# Patient Record
Sex: MALE | Race: WHITE | NOT HISPANIC OR LATINO | Employment: OTHER | URBAN - METROPOLITAN AREA
[De-identification: names, ages, dates, MRNs, and addresses within clinical notes are randomized per-mention and may not be internally consistent; named-entity substitution may affect disease eponyms.]

---

## 2017-01-19 RX ORDER — SALMETEROL XINAFOATE 50 UG/1
POWDER, METERED ORAL; RESPIRATORY (INHALATION)
Qty: 1 EACH | Refills: 4 | Status: SHIPPED | OUTPATIENT
Start: 2017-01-19 | End: 2017-06-14 | Stop reason: SDUPTHER

## 2017-03-08 RX ORDER — ALBUTEROL SULFATE 90 UG/1
AEROSOL, METERED RESPIRATORY (INHALATION)
Qty: 18 INHALER | Refills: 9 | Status: SHIPPED | OUTPATIENT
Start: 2017-03-08 | End: 2018-05-23 | Stop reason: SDUPTHER

## 2017-05-03 ENCOUNTER — OFFICE VISIT (OUTPATIENT)
Dept: INTERNAL MEDICINE | Facility: CLINIC | Age: 57
End: 2017-05-03
Payer: MEDICAID

## 2017-05-03 VITALS
BODY MASS INDEX: 24.57 KG/M2 | TEMPERATURE: 98 F | WEIGHT: 143.94 LBS | RESPIRATION RATE: 16 BRPM | DIASTOLIC BLOOD PRESSURE: 62 MMHG | HEART RATE: 64 BPM | HEIGHT: 64 IN | SYSTOLIC BLOOD PRESSURE: 134 MMHG

## 2017-05-03 DIAGNOSIS — E78.5 HYPERLIPIDEMIA, UNSPECIFIED HYPERLIPIDEMIA TYPE: ICD-10-CM

## 2017-05-03 DIAGNOSIS — Z12.11 SCREENING FOR COLON CANCER: ICD-10-CM

## 2017-05-03 DIAGNOSIS — N52.9 ERECTILE DYSFUNCTION, UNSPECIFIED ERECTILE DYSFUNCTION TYPE: ICD-10-CM

## 2017-05-03 DIAGNOSIS — R73.03 PRE-DIABETES: ICD-10-CM

## 2017-05-03 DIAGNOSIS — Z00.00 ANNUAL PHYSICAL EXAM: Primary | ICD-10-CM

## 2017-05-03 DIAGNOSIS — J45.22 MILD INTERMITTENT ASTHMA WITH STATUS ASTHMATICUS: ICD-10-CM

## 2017-05-03 PROCEDURE — 99396 PREV VISIT EST AGE 40-64: CPT | Mod: S$PBB,,, | Performed by: INTERNAL MEDICINE

## 2017-05-03 PROCEDURE — 99999 PR PBB SHADOW E&M-EST. PATIENT-LVL III: CPT | Mod: PBBFAC,,, | Performed by: INTERNAL MEDICINE

## 2017-05-03 PROCEDURE — 99213 OFFICE O/P EST LOW 20 MIN: CPT | Mod: PBBFAC,PO | Performed by: INTERNAL MEDICINE

## 2017-05-03 RX ORDER — SILDENAFIL 50 MG/1
50 TABLET, FILM COATED ORAL
Qty: 6 TABLET | Refills: 4 | Status: SHIPPED | OUTPATIENT
Start: 2017-05-03 | End: 2019-06-12 | Stop reason: SDUPTHER

## 2017-05-03 NOTE — MR AVS SNAPSHOT
[facility]   [facility]  [place] [address]  Phone: [phone]  Fax: [phone]                  Nakul Britton   5/3/2017 2:00 PM   Office Visit    Description:  Male : 1960   Provider:  Zaida Ornelas MD   Department:  [facility]           Reason for Visit     Annual Exam           Diagnoses this Visit        Comments    Annual physical exam    -  Primary     Pre-diabetes         Hyperlipidemia, unspecified hyperlipidemia type         Mild intermittent asthma with status asthmaticus         Screening for colon cancer         Erectile dysfunction, unspecified erectile dysfunction type                To Do List           Future Appointments        Provider Department Dept Phone    5/10/2017 12:15 PM SPECIMEN, [facility] - Specimen Lab [phone]    5/10/2017 12:30 PM LAB, [facility] - Laboratory [phone]      To Schedule:     Please call the Endoscopy Department at [phone] to schedule your appointment.          Goals (5 Years of Data)     None       These Medications        Disp Refills Start End    sildenafil (VIAGRA) 50 MG tablet 6 tablet 4 5/3/2017     Take 1 tablet (50 mg total) by mouth as needed. - Oral    [facility]: [facility] [place], [place] [address] #: [phone]         [facility]     [facility] Care [facility] -  Assistance  Unless otherwise directed by your provider, please contact [facility], our nurse care line that is available for  assistance.     Registered nurses in the [facility] provide: appointment scheduling, clinical advisement, health education, and other advisory services.  Call: [phone] (toll free)               Medications           Message regarding Medications     Verify the changes and/or additions to your medication regime listed below are the same as discussed with your clinician today.  If  "any of these changes or additions are incorrect, please notify your healthcare provider.             Verify that the below list of medications is an accurate representation of the medications you are currently taking.  If none reported, the list may be blank. If incorrect, please contact your healthcare provider. Carry this list with you in case of emergency.           Current Medications     fluticasone (FLOVENT HFA) 110 mcg/actuation inhaler INHALE 2 PUFFS INTO THE LUNGS EVERY 12 HOURS    montelukast (SINGULAIR) 10 mg tablet take 1 tablet by mouth at bedtime    predniSONE (DELTASONE) 10 MG tablet take 1 tablet by mouth once daily if needed    SEREVENT DISKUS 50 mcg/dose diskus inhaler inhale 1 puff orally twice a day    sildenafil (VIAGRA) 50 MG tablet Take 1 tablet (50 mg total) by mouth as needed.    VENTOLIN HFA 90 mcg/actuation inhaler inhale 2 puffs by mouth four times a day if needed           Clinical Reference Information           Your Vitals Were     BP Pulse Temp Resp Height Weight    134/62 (BP Location: Left arm, Patient Position: Sitting, BP Method: Manual) 64 98.3 °F (36.8 °C) (Oral) 16 5' 4" (1.626 m) 65.3 kg (143 lb 15.4 oz)    BMI                24.71 kg/m2          Blood Pressure          Most Recent Value    BP  134/62      Allergies as of 5/3/2017     No Known Allergies      Immunizations Administered on Date of Encounter - 5/3/2017     None      Orders Placed During Today's Visit      Normal Orders This Visit    Case request GI: COLONOSCOPY     Future Labs/Procedures Expected by Expires    CBC auto differential  5/3/2017 7/2/2018    Comprehensive metabolic panel  5/3/2017 7/2/2018    Hemoglobin A1c  5/3/2017 7/2/2018    Lipid panel  5/3/2017 7/2/2018    TSH  5/3/2017 7/2/2018    Urinalysis  As directed 5/3/2018      [facility] Sign-Up     Activating your [facility] account is as easy as 1-2-3!     1) Visit [email or web address], select Sign Up Now, enter this activation code and your date of birth, " then select Next.  [email or web address]  Expires: 6/17/2017  2:53 PM      2) Create a username and password to use when you visit [facility] in the future and select a security question in case you lose your password and select Next.    3) Enter your e-mail address and click Sign Up!    Additional Information  If you have questions, please e-mail [email or web address] or call [phone] to talk to our [facility] staff. Remember, [facility] is NOT to be used for urgent needs. For medical emergencies, dial 911.         Language Assistance Services     ATTENTION: Language assistance services are available, free of charge. Please call [phone].      ATENCIÓN: Si habla español, tiene a pulliam disposición servicios gratuitos de asistencia lingüística. Llame al [phone].     CHÚ Ý: N?u b?n nói Ti?ng Vi?t, có các d?ch v? h? tr? ngôn ng? mi?n phí dành cho b?n. G?i s? [phone].         [facility] complies with applicable Federal civil rights laws and does not discriminate on the basis of race, color, national origin, age, disability, or sex.

## 2017-05-03 NOTE — PROGRESS NOTES
Subjective:      Nakul Britton is a 56 y.o. male who presents for annual exam.    Family History:  family history includes No Known Problems in his father and mother.    Health Maintenance:  Health Maintenance       Date Due Completion Date    Hepatitis C Screening 1960 ---    TETANUS VACCINE 11/24/1976 ---    Colonoscopy 11/24/2008 ---    Influenza Vaccine 8/1/2017 ---    Lipid Panel 3/17/2021 3/17/2016        Eye exam: not in last few years  Dental Exam: not in last few years, no issues    Exercise: does [employer]  Diet: cooks with butter, adds salt to food, does not frequently eat fast food or restaurant food   Body mass index is 24.71 kg/(m^2).      Meds:   Current Outpatient Prescriptions:     fluticasone (FLOVENT HFA) 110 mcg/actuation inhaler, INHALE 2 PUFFS INTO THE LUNGS EVERY 12 HOURS, Disp: 3 g, Rfl: 3    montelukast (SINGULAIR) 10 mg tablet, take 1 tablet by mouth at bedtime, Disp: 30 tablet, Rfl: 11    predniSONE (DELTASONE) 10 MG tablet, take 1 tablet by mouth once daily if needed, Disp: 30 tablet, Rfl: 3    SEREVENT DISKUS 50 mcg/dose diskus inhaler, inhale 1 puff orally twice a day, Disp: 1 each, Rfl: 4    sildenafil (VIAGRA) 50 MG tablet, Take 1 tablet (50 mg total) by mouth as needed., Disp: 6 tablet, Rfl: 4    VENTOLIN HFA 90 mcg/actuation inhaler, inhale 2 puffs by mouth four times a day if needed, Disp: 18 Inhaler, Rfl: 9    PMHx:   Past Medical History:   Diagnosis Date    Allergy     Asthma     Hiatal hernia        PSHx:  Past Surgical History:   Procedure Laterality Date    CATARACT EXTRACTION         SocHx:   Social History     Social History    Marital status: Single     Spouse name: N/A    Number of children: N/A    Years of education: N/A     Social History Main Topics    Smoking status: Never Smoker    Smokeless tobacco: Never Used    Alcohol use 0.0 oz/week     0 Standard drinks or equivalent per week      Comment: 4-6 times per year, socially    Drug  use: No    Sexual activity: Yes     Partners: Female     Other Topics Concern    None     Social History Narrative    Works as a window washer       Review of Systems   Constitutional: Negative for chills, diaphoresis, fatigue and fever.   HENT: Negative for congestion, dental problem, ear discharge, ear pain, mouth sores, rhinorrhea, sinus pressure, sore throat and tinnitus.    Eyes: Negative for visual disturbance.   Respiratory: Negative for cough, chest tightness, shortness of breath and wheezing.    Cardiovascular: Negative for chest pain, palpitations and leg swelling.   Gastrointestinal: Negative for abdominal pain, blood in stool, constipation, diarrhea, nausea and vomiting.        Acid reflux, stable, aggravated by spicy foods, takes tums as needed   Genitourinary: Negative for decreased urine volume, dysuria, hematuria and urgency.   Musculoskeletal: Negative for arthralgias and myalgias.   Skin: Negative for rash.   Neurological: Negative for dizziness, speech difficulty, weakness, light-headedness, numbness and headaches.   Hematological: Negative for adenopathy.       Objective:      Physical Exam   Constitutional: He is oriented to person, place, and time. Vital signs are normal. He appears well-developed and well-nourished. No distress.   HENT:   Head: Normocephalic and atraumatic.   Right Ear: Hearing, tympanic membrane, external ear and ear canal normal. Tympanic membrane is not erythematous and not bulging.   Left Ear: Hearing, tympanic membrane, external ear and ear canal normal. Tympanic membrane is not erythematous and not bulging.   Nose: Nose normal.   Mouth/Throat: Uvula is midline, oropharynx is clear and moist and mucous membranes are normal. No oropharyngeal exudate or posterior oropharyngeal erythema.   Eyes: Conjunctivae, EOM and lids are normal. Pupils are equal, round, and reactive to light. No scleral icterus.   Neck: Trachea normal and normal range of motion. Neck supple. No  thyroid mass and no thyromegaly present.   Cardiovascular: Normal rate, regular rhythm, normal heart sounds, intact distal pulses and normal pulses.    No murmur heard.  Pulmonary/Chest: Effort normal and breath sounds normal. No tachypnea and no bradypnea. No respiratory distress. He has no wheezes.   Abdominal: Soft. Bowel sounds are normal. He exhibits no distension. There is no hepatosplenomegaly. There is no tenderness. There is no rigidity, no rebound and no guarding.   Musculoskeletal: Normal range of motion. He exhibits no edema or tenderness.   Lymphadenopathy:     He has no cervical adenopathy.        Right: No supraclavicular adenopathy present.        Left: No supraclavicular adenopathy present.   Neurological: He is alert and oriented to person, place, and time. He has normal strength and normal reflexes. He displays normal reflexes. No cranial nerve deficit or sensory deficit. Coordination and gait normal.   Skin: Skin is warm, dry and intact. No rash noted. He is not diaphoretic.   Psychiatric: He has a normal mood and affect.   Vitals reviewed.      Assessment:       1. Annual physical exam    2. Pre-diabetes    3. Hyperlipidemia, unspecified hyperlipidemia type    4. Mild intermittent asthma with status asthmaticus    5. Erectile dysfunction, unspecified erectile dysfunction type    6. Screening for colon cancer        Plan:       1,6. Ordered CBC, CMP, TSH, screening hepatitis C, lipid panel and U/A. Ordered colonoscopy.  2. Check HbA1c  3. Lipid panel, reduce intake of fatty foods, continue exercise program  4. No recent exacerbations, stable on serevent, flovent, singulair and PRN albuterol.  5. Patient did not try viagra but interested in trial, sent new Rx to patient's pharmacy.    RTC in 1 year or sooner if needed    Zaida Ornelas MD

## 2017-05-10 ENCOUNTER — LAB VISIT (OUTPATIENT)
Dept: LAB | Facility: HOSPITAL | Age: 57
End: 2017-05-10
Attending: INTERNAL MEDICINE
Payer: MEDICAID

## 2017-05-10 DIAGNOSIS — Z11.59 NEED FOR HEPATITIS C SCREENING TEST: ICD-10-CM

## 2017-05-10 DIAGNOSIS — Z00.00 ANNUAL PHYSICAL EXAM: ICD-10-CM

## 2017-05-10 LAB
ALBUMIN SERPL BCP-MCNC: 3.7 G/DL
ALP SERPL-CCNC: 63 U/L
ALT SERPL W/O P-5'-P-CCNC: 20 U/L
ANION GAP SERPL CALC-SCNC: 9 MMOL/L
AST SERPL-CCNC: 19 U/L
BASOPHILS # BLD AUTO: 0.03 K/UL
BASOPHILS NFR BLD: 0.5 %
BILIRUB SERPL-MCNC: 0.3 MG/DL
BUN SERPL-MCNC: 14 MG/DL
CALCIUM SERPL-MCNC: 9.5 MG/DL
CHLORIDE SERPL-SCNC: 105 MMOL/L
CHOLEST/HDLC SERPL: 4 {RATIO}
CO2 SERPL-SCNC: 26 MMOL/L
CREAT SERPL-MCNC: 0.9 MG/DL
DIFFERENTIAL METHOD: ABNORMAL
EOSINOPHIL # BLD AUTO: 0.2 K/UL
EOSINOPHIL NFR BLD: 2.3 %
ERYTHROCYTE [DISTWIDTH] IN BLOOD BY AUTOMATED COUNT: 15.7 %
EST. GFR  (AFRICAN AMERICAN): >60 ML/MIN/1.73 M^2
EST. GFR  (NON AFRICAN AMERICAN): >60 ML/MIN/1.73 M^2
GLUCOSE SERPL-MCNC: 94 MG/DL
HCT VFR BLD AUTO: 41.5 %
HDL/CHOLESTEROL RATIO: 25 %
HDLC SERPL-MCNC: 208 MG/DL
HDLC SERPL-MCNC: 52 MG/DL
HGB BLD-MCNC: 14 G/DL
LDLC SERPL CALC-MCNC: 129.6 MG/DL
LYMPHOCYTES # BLD AUTO: 2.7 K/UL
LYMPHOCYTES NFR BLD: 40.8 %
MCH RBC QN AUTO: 28.8 PG
MCHC RBC AUTO-ENTMCNC: 33.7 %
MCV RBC AUTO: 85 FL
MONOCYTES # BLD AUTO: 0.7 K/UL
MONOCYTES NFR BLD: 10.2 %
NEUTROPHILS # BLD AUTO: 3 K/UL
NEUTROPHILS NFR BLD: 45.7 %
NONHDLC SERPL-MCNC: 156 MG/DL
PLATELET # BLD AUTO: 261 K/UL
PMV BLD AUTO: 9.8 FL
POTASSIUM SERPL-SCNC: 4.2 MMOL/L
PROT SERPL-MCNC: 7.1 G/DL
RBC # BLD AUTO: 4.86 M/UL
SODIUM SERPL-SCNC: 140 MMOL/L
TRIGL SERPL-MCNC: 132 MG/DL
TSH SERPL DL<=0.005 MIU/L-ACNC: 1.89 UIU/ML
WBC # BLD AUTO: 6.64 K/UL

## 2017-05-10 PROCEDURE — 83036 HEMOGLOBIN GLYCOSYLATED A1C: CPT

## 2017-05-10 PROCEDURE — 85025 COMPLETE CBC W/AUTO DIFF WBC: CPT

## 2017-05-10 PROCEDURE — 80061 LIPID PANEL: CPT

## 2017-05-10 PROCEDURE — 80053 COMPREHEN METABOLIC PANEL: CPT

## 2017-05-10 PROCEDURE — 86803 HEPATITIS C AB TEST: CPT

## 2017-05-10 PROCEDURE — 84443 ASSAY THYROID STIM HORMONE: CPT

## 2017-05-10 PROCEDURE — 36415 COLL VENOUS BLD VENIPUNCTURE: CPT | Mod: PO

## 2017-05-11 LAB
ESTIMATED AVG GLUCOSE: 128 MG/DL
HBA1C MFR BLD HPLC: 6.1 %
HCV AB SERPL QL IA: NEGATIVE

## 2017-06-24 RX ORDER — SALMETEROL XINAFOATE 50 UG/1
POWDER, METERED ORAL; RESPIRATORY (INHALATION)
Qty: 1 EACH | Refills: 4 | Status: SHIPPED | OUTPATIENT
Start: 2017-06-24 | End: 2018-05-09 | Stop reason: SDUPTHER

## 2017-11-09 DIAGNOSIS — J45.20 MILD INTERMITTENT ASTHMA WITHOUT COMPLICATION: ICD-10-CM

## 2017-11-10 DIAGNOSIS — Z23 NEED FOR HEPATITIS B VACCINATION: Primary | ICD-10-CM

## 2017-11-10 RX ORDER — DEXAMETHASONE 4 MG/1
TABLET ORAL
Qty: 12 G | Refills: 11 | Status: SHIPPED | OUTPATIENT
Start: 2017-11-10 | End: 2018-11-15 | Stop reason: SDUPTHER

## 2017-11-10 RX ORDER — MONTELUKAST SODIUM 10 MG/1
TABLET ORAL
Qty: 90 TABLET | Refills: 3 | Status: SHIPPED | OUTPATIENT
Start: 2017-11-10 | End: 2018-09-27 | Stop reason: SDUPTHER

## 2017-11-10 NOTE — TELEPHONE ENCOUNTER
I called Mr Britton to let him know his Singular and Flovent has been refilled to [facility]. He asked if we only refill on Fridays? I told him Dr Holcomb is on vacation, and I was not here Wed. or Thurs due to vacation/car repairs, but I was here 11-6, 7, and 11-10. And the medicine is now at [facility].Yessy Kent LPN.

## 2017-11-10 NOTE — TELEPHONE ENCOUNTER
----- Message from Yahaira Melvin sent at 11/10/2017  2:05 PM CST -----  Contact: Nakul     tel:   [phone]  Pt.says he was told by Dr. Holcomb that he did not need to come in if he was not having any problems.    Has been on Serevent/Singulair and Flovent for years.   Out Of Medications.    Called your office last week for refills.  Pharmacy:  [facility]     [address]      Pt.. Says if Dr. Holcomb is on vacation, isn't there another dr. Suazo for  His pts. And why no response?    Pls call.

## 2017-11-14 RX ORDER — PREDNISONE 10 MG/1
TABLET ORAL
Qty: 30 TABLET | Refills: 3 | Status: SHIPPED | OUTPATIENT
Start: 2017-11-14 | End: 2018-04-04 | Stop reason: SDUPTHER

## 2017-11-15 NOTE — TELEPHONE ENCOUNTER
Spoke to pharmacistSuzan at [facility] pharmacy, patient is interested in getting this vaccine and pharmacy would like the approval to administer. Message sent to Dr Grant

## 2017-11-15 NOTE — TELEPHONE ENCOUNTER
Why does he need  Does he work in the health field?  Is he traveling to country it si required?  Does he care for someone w/ hep B?

## 2017-11-16 RX ORDER — HEPATITIS B VACCINE (RECOMBINANT) 10 UG/ML
INJECTION, SUSPENSION INTRAMUSCULAR; SUBCUTANEOUS
Qty: 1 VIAL | Refills: 0 | Status: SHIPPED | OUTPATIENT
Start: 2017-11-16 | End: 2019-06-12 | Stop reason: ALTCHOICE

## 2017-11-16 NOTE — TELEPHONE ENCOUNTER
"Spoke with patient, stated he has friends with Hep B and has frequent contact with them and "thought it was a good idea to get protected against it(Hep B)". Explain Hep B transmission briefly. Asked if he feels he needs this injection, patient stated, "yeah, I would like to get it if I can". Request sent to Dr Grant  "

## 2018-04-04 RX ORDER — PREDNISONE 10 MG/1
TABLET ORAL
Qty: 30 TABLET | Refills: 1 | Status: SHIPPED | OUTPATIENT
Start: 2018-04-04 | End: 2019-05-22 | Stop reason: SDUPTHER

## 2018-04-23 ENCOUNTER — TELEPHONE (OUTPATIENT)
Dept: ENDOSCOPY | Facility: HOSPITAL | Age: 58
End: 2018-04-23

## 2018-05-09 DIAGNOSIS — R06.09 DOE (DYSPNEA ON EXERTION): Primary | ICD-10-CM

## 2018-05-09 NOTE — TELEPHONE ENCOUNTER
----- Message from Yahaira Ngozi sent at 5/9/2018  1:51 PM CDT -----  Contact: Nakul   tel:  [phone]  Rx Refill/Request    Who Called:  Nakul    [phone]  Refill or New Rx:      refill  RX Name and Strength:  Serevent Diskus   50 Mcg  How is the patient currently taking it? (ex. 1XDay):  As directed   Is this a 30 day or 90 day RX:  30 day   Preferred Pharmacy with phone number:  [facility]   Tel:  [phone]   Local or Mail Order:  Local   Ordering Provider:   Dr. Holcomb   Communication Preference [facility] to Pt. (or) Call Back # and timeframe):  Call back   Additional Information:    OUt of Medication

## 2018-05-10 RX ORDER — PREDNISONE 10 MG/1
TABLET ORAL
Qty: 30 TABLET | Refills: 1 | OUTPATIENT
Start: 2018-05-10

## 2018-05-14 RX ORDER — PREDNISONE 20 MG/1
TABLET ORAL
Refills: 0 | COMMUNITY
Start: 2018-04-04 | End: 2020-12-01

## 2018-05-14 RX ORDER — PREDNISONE 10 MG/1
TABLET ORAL
Qty: 30 TABLET | Refills: 1 | OUTPATIENT
Start: 2018-05-14

## 2018-05-23 RX ORDER — ALBUTEROL SULFATE 90 UG/1
AEROSOL, METERED RESPIRATORY (INHALATION)
Qty: 18 G | Refills: 9 | Status: SHIPPED | OUTPATIENT
Start: 2018-05-23 | End: 2019-06-12 | Stop reason: SDUPTHER

## 2018-09-27 DIAGNOSIS — J45.20 MILD INTERMITTENT ASTHMA WITHOUT COMPLICATION: ICD-10-CM

## 2018-09-27 RX ORDER — MONTELUKAST SODIUM 10 MG/1
10 TABLET ORAL NIGHTLY
Qty: 90 TABLET | Refills: 3 | Status: SHIPPED | OUTPATIENT
Start: 2018-09-27 | End: 2019-06-12 | Stop reason: SDUPTHER

## 2018-09-27 NOTE — TELEPHONE ENCOUNTER
----- Message from Elisabeth Corbett sent at 9/27/2018  2:44 PM CDT -----  Contact: Self [phone]  ..Refill request.  montelukast (SINGULAIR) 10 mg tablet    ..  [id number] - [place] [address] AT [facility] & [address]  [place] [address]  Phone: [phone] Fax: [phone]

## 2018-11-15 DIAGNOSIS — J45.20 MILD INTERMITTENT ASTHMA WITHOUT COMPLICATION: ICD-10-CM

## 2018-11-15 RX ORDER — FLUTICASONE PROPIONATE 110 UG/1
AEROSOL, METERED RESPIRATORY (INHALATION)
Qty: 12 G | Refills: 11 | Status: SHIPPED | OUTPATIENT
Start: 2018-11-15 | End: 2019-05-31

## 2019-05-21 DIAGNOSIS — R06.09 DOE (DYSPNEA ON EXERTION): ICD-10-CM

## 2019-05-22 RX ORDER — PREDNISONE 10 MG/1
TABLET ORAL
Qty: 60 TABLET | Refills: 0 | Status: SHIPPED | OUTPATIENT
Start: 2019-05-22 | End: 2019-06-20 | Stop reason: SDUPTHER

## 2019-05-30 ENCOUNTER — TELEPHONE (OUTPATIENT)
Dept: PULMONOLOGY | Facility: CLINIC | Age: 59
End: 2019-05-30

## 2019-05-30 NOTE — TELEPHONE ENCOUNTER
Mr Britton is now on Medicaid and Flovent 110mcg is not covered. Dr Holcomb said Advair 100/50 should work well for him.When I called the pt back he was annoyed about having to change. He said he uses 4 puffs of Flovent and 2 puffs of Serevent in the morning. I told him that was incorrect, Flovent should be 2 puffs bid and I told him Advair is Serevent  and Flovent together in one container. He said he would try it, but he wasn't happy. Yessy Kent LPN

## 2019-05-30 NOTE — TELEPHONE ENCOUNTER
----- Message from Allyson Snow sent at 5/30/2019  1:16 PM CDT -----  Contact: self/[phone]        Pt call stating he needs a refill on the meds listed below.  Rx Refill/Request  fluticasone (FLOVENT HFA) 110 mcg/actuation inhaler    [id number] - [place], [place] - [address] [facility]

## 2019-05-31 ENCOUNTER — NURSE TRIAGE (OUTPATIENT)
Dept: ADMINISTRATIVE | Facility: CLINIC | Age: 59
End: 2019-05-31

## 2019-05-31 DIAGNOSIS — J45.901 ASTHMA EXACERBATION IN COPD: Primary | ICD-10-CM

## 2019-05-31 DIAGNOSIS — J44.1 ASTHMA EXACERBATION IN COPD: Primary | ICD-10-CM

## 2019-05-31 RX ORDER — FLUTICASONE PROPIONATE AND SALMETEROL XINAFOATE 115; 21 UG/1; UG/1
2 AEROSOL, METERED RESPIRATORY (INHALATION) 2 TIMES DAILY
Qty: 1 INHALER | Refills: 11 | Status: SHIPPED | OUTPATIENT
Start: 2019-05-31 | End: 2019-06-03 | Stop reason: SDUPTHER

## 2019-06-03 ENCOUNTER — TELEPHONE (OUTPATIENT)
Dept: PULMONOLOGY | Facility: CLINIC | Age: 59
End: 2019-06-03

## 2019-06-03 ENCOUNTER — TELEPHONE (OUTPATIENT)
Dept: INTERNAL MEDICINE | Facility: CLINIC | Age: 59
End: 2019-06-03

## 2019-06-03 DIAGNOSIS — J45.901 ASTHMA EXACERBATION IN COPD: ICD-10-CM

## 2019-06-03 DIAGNOSIS — E78.5 HYPERLIPIDEMIA, UNSPECIFIED HYPERLIPIDEMIA TYPE: Primary | ICD-10-CM

## 2019-06-03 DIAGNOSIS — J44.1 ASTHMA EXACERBATION IN COPD: ICD-10-CM

## 2019-06-03 DIAGNOSIS — R73.03 PRE-DIABETES: ICD-10-CM

## 2019-06-03 RX ORDER — FLUTICASONE PROPIONATE AND SALMETEROL XINAFOATE 115; 21 UG/1; UG/1
2 AEROSOL, METERED RESPIRATORY (INHALATION) 2 TIMES DAILY
Qty: 1 INHALER | Refills: 0 | Status: SHIPPED | OUTPATIENT
Start: 2019-06-03 | End: 2019-06-03

## 2019-06-03 RX ORDER — FLUTICASONE PROPIONATE AND SALMETEROL XINAFOATE 115; 21 UG/1; UG/1
2 AEROSOL, METERED RESPIRATORY (INHALATION) 2 TIMES DAILY
Qty: 1 INHALER | Refills: 11 | OUTPATIENT
Start: 2019-06-03 | End: 2020-06-02

## 2019-06-03 NOTE — TELEPHONE ENCOUNTER
A note from triage nurse Molly Velásquez RN states Mr Britton is still mad about his insurance no longer covering Flovent and Dr Holcomb changing it to Advair HFA on Friday 5-31-19.  [facility] Advair [facility] not [facility] either. I spoke to the Pharmacist and she tried running Symbicort and Dulera and Dulera 100/5mcg is covered. Dr Holcomb said any dual inhaler is fine and that Mr Britton is overdue for a clinic visit, last seen 2016. I will call the patient and instruct him on Dulera instructions. Yessy Kent LPN.

## 2019-06-03 NOTE — TELEPHONE ENCOUNTER
----- Message from Priyanka Merino sent at 6/3/2019 11:33 AM CDT -----  Contact: pt [phone]  Patient is calling for an RX refill or new RX.  Is this a refill or new RX:  New   RX name and strength: fluticasone (FLOVENT HFA) 110 mcg/actuation inhaler  Directions (copy/paste from chart):    Is this a 30 day or 90 day RX:    Local pharmacy or mail order pharmacy:  Local   Pharmacy name and phone # (copy/paste from chart):    [facility] #[id number] - [place], [place] - [address] [facility] AT [facility] [address]    Comments:  Prior authorization is required

## 2019-06-03 NOTE — TELEPHONE ENCOUNTER
----- Message from Allyson Snow sent at 6/3/2019 11:05 AM CDT -----  Contact:    Patient   [phone]  Rx Refill/Request     Is this a Refill or New Rx:  Refill     Rx Name and Strength:    Flolvent    Preferred Pharmacy with phone number:   [facility]   [address] [facility] [facility] [address] AT [facility] [address] [phone]    Communication Preference:   Phone    Additional Information:   Pt stated that he does not need Advair , he needs Flolvent ...  Give the pt a call back to verify which medication needed

## 2019-06-05 ENCOUNTER — LAB VISIT (OUTPATIENT)
Dept: LAB | Facility: HOSPITAL | Age: 59
End: 2019-06-05
Attending: INTERNAL MEDICINE
Payer: MEDICAID

## 2019-06-05 DIAGNOSIS — E78.5 HYPERLIPIDEMIA, UNSPECIFIED HYPERLIPIDEMIA TYPE: ICD-10-CM

## 2019-06-05 DIAGNOSIS — J44.1 ASTHMA EXACERBATION IN COPD: ICD-10-CM

## 2019-06-05 DIAGNOSIS — R73.03 PRE-DIABETES: ICD-10-CM

## 2019-06-05 DIAGNOSIS — J45.901 ASTHMA EXACERBATION IN COPD: ICD-10-CM

## 2019-06-05 LAB
ALBUMIN SERPL BCP-MCNC: 3.7 G/DL (ref 3.5–5.2)
ALP SERPL-CCNC: 63 U/L (ref 55–135)
ALT SERPL W/O P-5'-P-CCNC: 19 U/L (ref 10–44)
ANION GAP SERPL CALC-SCNC: 7 MMOL/L (ref 8–16)
AST SERPL-CCNC: 22 U/L (ref 10–40)
BASOPHILS # BLD AUTO: 0.06 K/UL (ref 0–0.2)
BASOPHILS NFR BLD: 1 % (ref 0–1.9)
BILIRUB SERPL-MCNC: 0.4 MG/DL (ref 0.1–1)
BUN SERPL-MCNC: 14 MG/DL (ref 6–20)
CALCIUM SERPL-MCNC: 9.2 MG/DL (ref 8.7–10.5)
CHLORIDE SERPL-SCNC: 108 MMOL/L (ref 95–110)
CHOLEST SERPL-MCNC: 212 MG/DL (ref 120–199)
CHOLEST/HDLC SERPL: 4.9 {RATIO} (ref 2–5)
CO2 SERPL-SCNC: 23 MMOL/L (ref 23–29)
CREAT SERPL-MCNC: 0.8 MG/DL (ref 0.5–1.4)
DIFFERENTIAL METHOD: ABNORMAL
EOSINOPHIL # BLD AUTO: 0.1 K/UL (ref 0–0.5)
EOSINOPHIL NFR BLD: 1.7 % (ref 0–8)
ERYTHROCYTE [DISTWIDTH] IN BLOOD BY AUTOMATED COUNT: 15.2 % (ref 11.5–14.5)
EST. GFR  (AFRICAN AMERICAN): >60 ML/MIN/1.73 M^2
EST. GFR  (NON AFRICAN AMERICAN): >60 ML/MIN/1.73 M^2
ESTIMATED AVG GLUCOSE: 120 MG/DL (ref 68–131)
GLUCOSE SERPL-MCNC: 100 MG/DL (ref 70–110)
HBA1C MFR BLD HPLC: 5.8 % (ref 4–5.6)
HCT VFR BLD AUTO: 42.8 % (ref 40–54)
HDLC SERPL-MCNC: 43 MG/DL (ref 40–75)
HDLC SERPL: 20.3 % (ref 20–50)
HGB BLD-MCNC: 13.5 G/DL (ref 14–18)
IMM GRANULOCYTES # BLD AUTO: 0.02 K/UL (ref 0–0.04)
IMM GRANULOCYTES NFR BLD AUTO: 0.3 % (ref 0–0.5)
LDLC SERPL CALC-MCNC: 136.4 MG/DL (ref 63–159)
LYMPHOCYTES # BLD AUTO: 2.3 K/UL (ref 1–4.8)
LYMPHOCYTES NFR BLD: 38.5 % (ref 18–48)
MCH RBC QN AUTO: 28.5 PG (ref 27–31)
MCHC RBC AUTO-ENTMCNC: 31.5 G/DL (ref 32–36)
MCV RBC AUTO: 90 FL (ref 82–98)
MONOCYTES # BLD AUTO: 0.6 K/UL (ref 0.3–1)
MONOCYTES NFR BLD: 10.7 % (ref 4–15)
NEUTROPHILS # BLD AUTO: 2.8 K/UL (ref 1.8–7.7)
NEUTROPHILS NFR BLD: 47.8 % (ref 38–73)
NONHDLC SERPL-MCNC: 169 MG/DL
NRBC BLD-RTO: 0 /100 WBC
PLATELET # BLD AUTO: 271 K/UL (ref 150–350)
PMV BLD AUTO: 9.9 FL (ref 9.2–12.9)
POTASSIUM SERPL-SCNC: 4.1 MMOL/L (ref 3.5–5.1)
PROT SERPL-MCNC: 7 G/DL (ref 6–8.4)
RBC # BLD AUTO: 4.74 M/UL (ref 4.6–6.2)
SODIUM SERPL-SCNC: 138 MMOL/L (ref 136–145)
TRIGL SERPL-MCNC: 163 MG/DL (ref 30–150)
TSH SERPL DL<=0.005 MIU/L-ACNC: 1.16 UIU/ML (ref 0.4–4)
WBC # BLD AUTO: 5.89 K/UL (ref 3.9–12.7)

## 2019-06-05 PROCEDURE — 36415 COLL VENOUS BLD VENIPUNCTURE: CPT | Mod: PO

## 2019-06-05 PROCEDURE — 84443 ASSAY THYROID STIM HORMONE: CPT

## 2019-06-05 PROCEDURE — 85025 COMPLETE CBC W/AUTO DIFF WBC: CPT

## 2019-06-05 PROCEDURE — 80053 COMPREHEN METABOLIC PANEL: CPT

## 2019-06-05 PROCEDURE — 83036 HEMOGLOBIN GLYCOSYLATED A1C: CPT

## 2019-06-05 PROCEDURE — 80061 LIPID PANEL: CPT

## 2019-06-06 ENCOUNTER — TELEPHONE (OUTPATIENT)
Dept: INTERNAL MEDICINE | Facility: CLINIC | Age: 59
End: 2019-06-06

## 2019-06-06 DIAGNOSIS — Z00.00 ANNUAL PHYSICAL EXAM: Primary | ICD-10-CM

## 2019-06-06 NOTE — TELEPHONE ENCOUNTER
----- Message from Dorie Mann sent at 6/6/2019 10:56 AM CDT -----  Contact: [facility]  Lab states that they are calling to advise your office that the pt did not leave a urine specimen on 06/05/19. She states that if you need him to come in and give a specimen that your offic would have to put in some new orders and contact the pt. Please advise.      Thanks

## 2019-06-06 NOTE — TELEPHONE ENCOUNTER
If urine specimen is needed please enter orders and alert staff so appt can be made and orders can be linked. thanks

## 2019-06-11 ENCOUNTER — TELEPHONE (OUTPATIENT)
Dept: INTERNAL MEDICINE | Facility: CLINIC | Age: 59
End: 2019-06-11

## 2019-06-11 NOTE — TELEPHONE ENCOUNTER
----- Message from Moses Mc sent at 6/11/2019 11:57 AM CDT -----  Contact: Patient   Patient calling stating has already had the Urine sample dropped off last Thursday, just to inform the office, stating does not need the appt set for 06/12/19, has been canceled.    Thank you

## 2019-06-12 ENCOUNTER — OFFICE VISIT (OUTPATIENT)
Dept: INTERNAL MEDICINE | Facility: CLINIC | Age: 59
End: 2019-06-12
Payer: MEDICAID

## 2019-06-12 ENCOUNTER — DOCUMENTATION ONLY (OUTPATIENT)
Dept: INTERNAL MEDICINE | Facility: CLINIC | Age: 59
End: 2019-06-12

## 2019-06-12 VITALS
HEIGHT: 64 IN | DIASTOLIC BLOOD PRESSURE: 80 MMHG | WEIGHT: 149.06 LBS | TEMPERATURE: 98 F | SYSTOLIC BLOOD PRESSURE: 130 MMHG | BODY MASS INDEX: 25.45 KG/M2 | HEART RATE: 68 BPM

## 2019-06-12 DIAGNOSIS — E78.5 HYPERLIPIDEMIA, UNSPECIFIED HYPERLIPIDEMIA TYPE: ICD-10-CM

## 2019-06-12 DIAGNOSIS — Z12.11 SCREEN FOR COLON CANCER: ICD-10-CM

## 2019-06-12 DIAGNOSIS — Z00.00 ANNUAL PHYSICAL EXAM: Primary | ICD-10-CM

## 2019-06-12 DIAGNOSIS — N52.9 ERECTILE DYSFUNCTION, UNSPECIFIED ERECTILE DYSFUNCTION TYPE: ICD-10-CM

## 2019-06-12 DIAGNOSIS — R73.03 PRE-DIABETES: ICD-10-CM

## 2019-06-12 DIAGNOSIS — J45.40 MODERATE PERSISTENT ASTHMA WITHOUT COMPLICATION: ICD-10-CM

## 2019-06-12 DIAGNOSIS — D22.9 CHANGE IN MOLE: ICD-10-CM

## 2019-06-12 PROBLEM — J44.1 ASTHMA EXACERBATION IN COPD: Status: RESOLVED | Noted: 2019-06-03 | Resolved: 2019-06-12

## 2019-06-12 PROBLEM — J45.901 ASTHMA EXACERBATION IN COPD: Status: RESOLVED | Noted: 2019-06-03 | Resolved: 2019-06-12

## 2019-06-12 PROCEDURE — 99999 PR PBB SHADOW E&M-EST. PATIENT-LVL IV: ICD-10-PCS | Mod: PBBFAC,,, | Performed by: INTERNAL MEDICINE

## 2019-06-12 PROCEDURE — 99214 OFFICE O/P EST MOD 30 MIN: CPT | Mod: PBBFAC,PO | Performed by: INTERNAL MEDICINE

## 2019-06-12 PROCEDURE — 99396 PR PREVENTIVE VISIT,EST,40-64: ICD-10-PCS | Mod: S$PBB,,, | Performed by: INTERNAL MEDICINE

## 2019-06-12 PROCEDURE — 99999 PR PBB SHADOW E&M-EST. PATIENT-LVL IV: CPT | Mod: PBBFAC,,, | Performed by: INTERNAL MEDICINE

## 2019-06-12 PROCEDURE — 99396 PREV VISIT EST AGE 40-64: CPT | Mod: S$PBB,,, | Performed by: INTERNAL MEDICINE

## 2019-06-12 RX ORDER — MONTELUKAST SODIUM 10 MG/1
10 TABLET ORAL NIGHTLY
Qty: 90 TABLET | Refills: 3 | Status: SHIPPED | OUTPATIENT
Start: 2019-06-12 | End: 2020-09-14 | Stop reason: SDUPTHER

## 2019-06-12 RX ORDER — ALBUTEROL SULFATE 90 UG/1
AEROSOL, METERED RESPIRATORY (INHALATION)
Qty: 18 G | Refills: 5 | Status: SHIPPED | OUTPATIENT
Start: 2019-06-12 | End: 2019-10-18 | Stop reason: SDUPTHER

## 2019-06-12 RX ORDER — SILDENAFIL 50 MG/1
50 TABLET, FILM COATED ORAL
Qty: 10 TABLET | Refills: 5 | Status: SHIPPED | OUTPATIENT
Start: 2019-06-12 | End: 2020-05-04

## 2019-06-12 NOTE — PROGRESS NOTES
Subjective:      Nakul Britton is a 58 y.o. male who presents for annual exam.    Family History:  family history includes No Known Problems in his father and mother.    Health Maintenance:  Health Maintenance       Date Due Completion Date    TETANUS VACCINE 11/24/1976 ---    Shingles Vaccine (1 of 2) 11/24/2008 ---    Colonoscopy 11/24/2008 ---    Influenza Vaccine 08/01/2019 ---    Lipid Panel 06/05/2024 6/5/2019        Eye exam: due  Dental Exam: due  Tetanus: within last 10 years    Diet: not low in sugar or fat  Body mass index is 25.58 kg/m².    Meds:   Current Outpatient Medications:     albuterol (VENTOLIN HFA) 90 mcg/actuation inhaler, inhale 2 puffs by mouth four times a day if needed, Disp: 18 g, Rfl: 5    mometasone-formoterol (DULERA) 100-5 mcg/actuation HFAA, Inhale 2 puffs into the lungs 2 (two) times daily. Controller, Disp: , Rfl:     montelukast (SINGULAIR) 10 mg tablet, Take 1 tablet (10 mg total) by mouth every evening., Disp: 90 tablet, Rfl: 3    predniSONE (DELTASONE) 10 MG tablet, TAKE 1 TO 2 TABLETS BY MOUTH ONCE DAILY, Disp: 60 tablet, Rfl: 0    predniSONE (DELTASONE) 20 MG tablet, TK 2 TS PO D FOR 5 DAYS, Disp: , Rfl: 0    sildenafil (VIAGRA) 50 MG tablet, Take 1 tablet (50 mg total) by mouth as needed., Disp: 10 tablet, Rfl: 5    PMHx:   Past Medical History:   Diagnosis Date    Allergy     Asthma     Hiatal hernia        PSHx:  Past Surgical History:   Procedure Laterality Date    CATARACT EXTRACTION         SocHx:   Social History     Socioeconomic History    Marital status: Single     Spouse name: Not on file    Number of children: Not on file    Years of education: Not on file    Highest education level: Not on file   Occupational History    Not on file   Social Needs    Financial resource strain: Not on file    Food insecurity:     Worry: Not on file     Inability: Not on file    Transportation needs:     Medical: Not on file     Non-medical: Not on file    Tobacco Use    Smoking status: Never Smoker    Smokeless tobacco: Never Used   Substance and Sexual Activity    Alcohol use: Yes     Alcohol/week: 0.0 oz     Comment: 4-6 times per year, socially    Drug use: No    Sexual activity: Yes     Partners: Female   Lifestyle    Physical activity:     Days per week: Not on file     Minutes per session: Not on file    Stress: Not on file   Relationships    Social connections:     Talks on phone: Not on file     Gets together: Not on file     Attends [employer] service: Not on file     Active member of club or organization: Not on file     Attends meetings of clubs or organizations: Not on file     Relationship status: Not on file   Other Topics Concern    Not on file   Social History Narrative    Works as a window washer       Review of Systems   Constitutional: Negative for chills, diaphoresis, fatigue and fever.   HENT: Negative for congestion, dental problem, ear pain, hearing loss, postnasal drip, rhinorrhea, sinus pressure, sore throat and trouble swallowing.    Eyes: Negative for discharge, redness and visual disturbance.        Notices some changes in vision   Respiratory: Negative for cough, chest tightness and shortness of breath.    Cardiovascular: Negative for chest pain, palpitations and leg swelling.   Gastrointestinal: Negative for abdominal pain, blood in stool, constipation, diarrhea, nausea and vomiting.   Endocrine: Negative for polydipsia and polyuria.   Genitourinary: Negative for dysuria, frequency and hematuria.   Musculoskeletal: Negative for arthralgias and myalgias.   Skin: Positive for color change (has a few skin lesions). Negative for rash.   Neurological: Negative for dizziness, weakness, numbness and headaches.   Hematological: Negative for adenopathy.   Psychiatric/Behavioral: Negative for dysphoric mood and sleep disturbance. The patient is not nervous/anxious.        Objective:      Physical Exam   Constitutional: He is oriented to  person, place, and time. Vital signs are normal. He appears well-developed and well-nourished. No distress.   HENT:   Head: Normocephalic and atraumatic.   Right Ear: Hearing, tympanic membrane, external ear and ear canal normal. Tympanic membrane is not erythematous and not bulging.   Left Ear: Hearing, tympanic membrane, external ear and ear canal normal. Tympanic membrane is not erythematous and not bulging.   Nose: Nose normal.   Mouth/Throat: Uvula is midline, oropharynx is clear and moist and mucous membranes are normal. No oropharyngeal exudate or posterior oropharyngeal erythema.   Eyes: Pupils are equal, round, and reactive to light. Conjunctivae, EOM and lids are normal. No scleral icterus.   Neck: Normal range of motion. Neck supple. No thyroid mass and no thyromegaly present.   Cardiovascular: Normal rate, regular rhythm, normal heart sounds, intact distal pulses and normal pulses.   No murmur heard.  Pulmonary/Chest: Effort normal and breath sounds normal. He has no wheezes.   Abdominal: Soft. Bowel sounds are normal. He exhibits no distension. There is no hepatosplenomegaly. There is no tenderness. There is no rigidity, no rebound and no guarding.   Musculoskeletal: Normal range of motion. He exhibits no edema.   Lymphadenopathy:     He has no cervical adenopathy.        Right: No supraclavicular adenopathy present.        Left: No supraclavicular adenopathy present.   Neurological: He is alert and oriented to person, place, and time. He has normal reflexes. He displays normal reflexes. Coordination and gait normal.   Skin: Skin is warm, dry and intact. No rash noted.   Few scattered erythematous lesions on arms   Psychiatric: He has a normal mood and affect.   Vitals reviewed.      Assessment:       1. Annual physical exam    2. Mild intermittent asthma without complication    3. Moderate persistent asthma without complication    4. Hyperlipidemia, unspecified hyperlipidemia type    5. Pre-diabetes     6. Change in mole    7. Erectile dysfunction, unspecified erectile dysfunction type    8. Screen for colon cancer        Plan:       1. Annual physical exam  - reviewed lab results with patient  - advised patient to proceed with Shingrix and tetanus shot if needed    2. Mild intermittent asthma without complication  - patient is confused about his regimen, he will continue Dulera for now  - albuterol (VENTOLIN HFA) 90 mcg/actuation inhaler; inhale 2 puffs by mouth four times a day if needed  Dispense: 18 g; Refill: 5  - montelukast (SINGULAIR) 10 mg tablet; Take 1 tablet (10 mg total) by mouth every evening.  Dispense: 90 tablet; Refill: 3    3. Hyperlipidemia, unspecified hyperlipidemia type  - reduce fat and sugar intake     4. Pre-diabetes  - HbA1c is 5.8, discussed reducing sugar intake    5. Change in mole  - Ambulatory Referral to Dermatology  - reports h/o pre-malignant skin lesions    6. Erectile dysfunction, unspecified erectile dysfunction type  - sildenafil (VIAGRA) 50 MG tablet; Take 1 tablet (50 mg total) by mouth as needed.  Dispense: 10 tablet; Refill: 5, printed prescription    7. Screen for colon cancer  - Fecal Immunochemical Test (iFOBT); Future    RTC in 1 year or sooner if needed    Zaida Ornelas MD

## 2019-06-14 ENCOUNTER — PATIENT MESSAGE (OUTPATIENT)
Dept: INTERNAL MEDICINE | Facility: CLINIC | Age: 59
End: 2019-06-14

## 2019-06-17 ENCOUNTER — PATIENT MESSAGE (OUTPATIENT)
Dept: INTERNAL MEDICINE | Facility: CLINIC | Age: 59
End: 2019-06-17

## 2019-06-20 RX ORDER — PREDNISONE 10 MG/1
TABLET ORAL
Qty: 60 TABLET | Refills: 0 | Status: SHIPPED | OUTPATIENT
Start: 2019-06-20 | End: 2019-07-18 | Stop reason: SDUPTHER

## 2019-06-21 ENCOUNTER — TELEPHONE (OUTPATIENT)
Dept: PULMONOLOGY | Facility: CLINIC | Age: 59
End: 2019-06-21

## 2019-06-21 NOTE — TELEPHONE ENCOUNTER
----- Message from Allyson Snow sent at 6/21/2019 10:01 AM CDT -----  Contact:    Patient    Rx Refill/Request     Is this a Refill or New Rx:    Refill    Rx Name and Strength:    Preferred Pharmacy with phone number:        Preferred Pharmacies      [facility]  - [address] [place] [address] AT [facility] & [address] [phone] (Phone)  [phone] (Fax)      mometasone-formoterol (DULERA) 100-5 mcg/actuation HFAA  / Advair  ..  Pt was informed by the pharmacy that he can not take all three medications...  Call the pt back to verify

## 2019-06-21 NOTE — TELEPHONE ENCOUNTER
After reviewing Mrs. Krishnamurthy note from 06/03, patient was advised that Dr Holcomb said any dual inhaler is fine, and that he should be using Dulera 100-5mcg. Patient verbalized that he understand.

## 2019-07-18 RX ORDER — PREDNISONE 10 MG/1
TABLET ORAL
Qty: 60 TABLET | Refills: 0 | Status: SHIPPED | OUTPATIENT
Start: 2019-07-18 | End: 2019-08-17 | Stop reason: SDUPTHER

## 2019-08-21 RX ORDER — PREDNISONE 10 MG/1
TABLET ORAL
Qty: 60 TABLET | Refills: 0 | Status: SHIPPED | OUTPATIENT
Start: 2019-08-21 | End: 2019-09-19 | Stop reason: SDUPTHER

## 2019-09-19 RX ORDER — PREDNISONE 10 MG/1
TABLET ORAL
Qty: 60 TABLET | Refills: 0 | Status: SHIPPED | OUTPATIENT
Start: 2019-09-19 | End: 2019-10-21 | Stop reason: SDUPTHER

## 2019-10-18 DIAGNOSIS — J45.40 MODERATE PERSISTENT ASTHMA WITHOUT COMPLICATION: ICD-10-CM

## 2019-10-18 RX ORDER — ALBUTEROL SULFATE 90 UG/1
AEROSOL, METERED RESPIRATORY (INHALATION)
Qty: 18 G | Refills: 2 | Status: SHIPPED | OUTPATIENT
Start: 2019-10-18 | End: 2019-12-04 | Stop reason: SDUPTHER

## 2019-10-22 RX ORDER — PREDNISONE 10 MG/1
TABLET ORAL
Qty: 60 TABLET | Refills: 0 | Status: SHIPPED | OUTPATIENT
Start: 2019-10-22 | End: 2019-11-24 | Stop reason: SDUPTHER

## 2019-11-14 DIAGNOSIS — J45.20 MILD INTERMITTENT ASTHMA WITHOUT COMPLICATION: ICD-10-CM

## 2019-11-14 RX ORDER — MONTELUKAST SODIUM 10 MG/1
TABLET ORAL
Qty: 90 TABLET | Refills: 0 | Status: SHIPPED | OUTPATIENT
Start: 2019-11-14 | End: 2020-09-15

## 2019-11-24 RX ORDER — PREDNISONE 10 MG/1
TABLET ORAL
Qty: 60 TABLET | Refills: 0 | Status: SHIPPED | OUTPATIENT
Start: 2019-11-24 | End: 2019-12-27

## 2019-12-04 ENCOUNTER — TELEPHONE (OUTPATIENT)
Dept: INTERNAL MEDICINE | Facility: CLINIC | Age: 59
End: 2019-12-04

## 2019-12-04 DIAGNOSIS — J45.20 MILD INTERMITTENT ASTHMA WITHOUT COMPLICATION: ICD-10-CM

## 2019-12-04 DIAGNOSIS — J45.40 MODERATE PERSISTENT ASTHMA WITHOUT COMPLICATION: Primary | ICD-10-CM

## 2019-12-04 DIAGNOSIS — J45.40 MODERATE PERSISTENT ASTHMA WITHOUT COMPLICATION: ICD-10-CM

## 2019-12-04 RX ORDER — FLUTICASONE PROPIONATE 110 UG/1
AEROSOL, METERED RESPIRATORY (INHALATION)
Qty: 12 G | Refills: 0 | Status: SHIPPED | OUTPATIENT
Start: 2019-12-04 | End: 2019-12-05 | Stop reason: SDUPTHER

## 2019-12-04 NOTE — TELEPHONE ENCOUNTER
----- Message from Yessy Makennasantana sent at 12/4/2019  8:26 AM CST -----  Contact: Patient [phone]  Patient is calling for an RX refill or new RX.  Is this a refill or new RX:  refill  RX name and strength: mometasone-formoterol (DULERA) 100-5 mcg/actuation HFAA       / Advair   Directions (copy/paste from chart):    Is this a 30 day or 90 day RX:    Local pharmacy or mail order pharmacy:  local  Pharmacy name and phone # (copy/paste from chart):   [id number] - [place], [place] - [address] [facility] AT [facility] [address] [phone] (Phone)  [phone] (Fax)    Comments:

## 2019-12-04 NOTE — TELEPHONE ENCOUNTER
----- Message from Gilberto Williamson sent at 12/4/2019  8:21 AM CST -----  Contact: self  Rx Refill/Request     Is this a Refill or New Rx:  Refill    Rx Name and Strength:  albuterol (PROVENTIL/VENTOLIN HFA) 90 mcg/actuation inhaler    [facility] [phone] (Phone)  [phone] (Fax)    Communication Preference: [phone]    Additional Information: n/a

## 2019-12-04 NOTE — TELEPHONE ENCOUNTER
----- Message from Yessy Fan sent at 12/4/2019  8:28 AM CST -----  Contact: Patient [phone]  Patient is requesting to have a pulmonary doctor referral.    Please call and advise.    Thank You

## 2019-12-04 NOTE — TELEPHONE ENCOUNTER
Spoke to pt.  His current pulmonologist, Dr Holcomb, is retiring and no appointments available with his associates.    Currently he's doing fine, just needs his medications refilled.   Hasn't needed to see Zhang for years.  Refill request in separate message.     Would prefer pulmonologist in [place], doesn't want to go to main campus.

## 2019-12-04 NOTE — TELEPHONE ENCOUNTER
Pt requesting refills of Albuterol Inh and Dulera     Last office visit:  6/12/19 (annual)  Recall set for June 2020 annual    [facility] set for escript

## 2019-12-05 RX ORDER — ALBUTEROL SULFATE 90 UG/1
AEROSOL, METERED RESPIRATORY (INHALATION)
Qty: 18 G | Refills: 2 | Status: SHIPPED | OUTPATIENT
Start: 2019-12-05 | End: 2020-04-13

## 2019-12-05 NOTE — TELEPHONE ENCOUNTER
His pulmonologist ok'ed Dulera but refilled the flovent inhaler on 12/4/19.     Will refill Dulera and stop flovent. Let him know not to take both as both medications contain steroids.

## 2019-12-05 NOTE — TELEPHONE ENCOUNTER
Spoke to pt.  He states he doesn't take Dulera.  Used it once and it didn't work for him.  Requests it be removed from his chart.  Done    LMOV for [facility] to remove from pt's profile.

## 2019-12-18 ENCOUNTER — HOSPITAL ENCOUNTER (OUTPATIENT)
Dept: PULMONOLOGY | Facility: CLINIC | Age: 59
Discharge: HOME | End: 2019-12-18
Payer: MEDICAID

## 2019-12-18 ENCOUNTER — OFFICE VISIT (OUTPATIENT)
Dept: PULMONOLOGY | Facility: CLINIC | Age: 59
End: 2019-12-18
Payer: MEDICAID

## 2019-12-18 VITALS
BODY MASS INDEX: 25.27 KG/M2 | HEART RATE: 72 BPM | SYSTOLIC BLOOD PRESSURE: 124 MMHG | DIASTOLIC BLOOD PRESSURE: 78 MMHG | WEIGHT: 148 LBS | HEIGHT: 64 IN | OXYGEN SATURATION: 98 %

## 2019-12-18 DIAGNOSIS — J45.20 MILD INTERMITTENT ASTHMA WITHOUT COMPLICATION: ICD-10-CM

## 2019-12-18 DIAGNOSIS — K21.9 GASTROESOPHAGEAL REFLUX DISEASE, ESOPHAGITIS PRESENCE NOT SPECIFIED: ICD-10-CM

## 2019-12-18 DIAGNOSIS — J45.20 MILD INTERMITTENT ASTHMA WITHOUT COMPLICATION: Primary | ICD-10-CM

## 2019-12-18 LAB
DLCO ADJ PRE: 19 ML/(MIN*MMHG) (ref 17.12–30.97)
DLCO SINGLE BREATH LLN: 17.12
DLCO SINGLE BREATH PRE REF: 79 %
DLCO SINGLE BREATH REF: 24.05
DLCOC SBVA LLN: 2.64
DLCOC SBVA PRE REF: 109.1 %
DLCOC SBVA REF: 4.05
DLCOC SINGLE BREATH LLN: 17.12
DLCOC SINGLE BREATH PRE REF: 79 %
DLCOC SINGLE BREATH REF: 24.05
DLCOCSBVAULN: 5.45
DLCOCSINGLEBREATHULN: 30.97
DLCOSINGLEBREATHULN: 30.97
DLCOVA LLN: 2.64
DLCOVA PRE REF: 109.1 %
DLCOVA PRE: 4.42 ML/(MIN*MMHG*L) (ref 2.64–5.45)
DLCOVA REF: 4.05
DLCOVAULN: 5.45
DLVAADJ PRE: 4.42 ML/(MIN*MMHG*L) (ref 2.64–5.45)
ERVN2 LLN: 1.03
ERVN2 PRE REF: 60.4 %
ERVN2 PRE: 0.62 L (ref 1.03–1.03)
ERVN2 REF: 1.03
ERVN2ULN: 1.03
FEF 25 75 LLN: 1.23
FEF 25 75 PRE REF: 27.8 %
FEF 25 75 REF: 2.53
FET100 CHG: -3.8 %
FEV05 LLN: 1.11
FEV05 REF: 2.25
FEV1 CHG: 4.1 %
FEV1 FVC LLN: 66
FEV1 FVC PRE REF: 66.7 %
FEV1 FVC REF: 78
FEV1 LLN: 2.2
FEV1 PRE REF: 50.5 %
FEV1 REF: 2.93
FEV1 VOL CHG: 0.06
FRCN2 LLN: 2.29
FRCN2 PRE REF: 86.5 %
FRCN2 REF: 3.27
FRCN2ULN: 4.26
FVC CHG: 5.4 %
FVC LLN: 2.84
FVC PRE REF: 75.7 %
FVC REF: 3.73
FVC VOL CHG: 0.15
IVC PRE: 2.6 L (ref 2.84–4.62)
IVC SINGLE BREATH LLN: 2.84
IVC SINGLE BREATH PRE REF: 69.7 %
IVC SINGLE BREATH REF: 3.73
IVCSINGLEBREATHULN: 4.62
PEF LLN: 5.94
PEF PRE REF: 53.4 %
PEF REF: 7.89
PHYSICIAN COMMENT: ABNORMAL
POST FEF 25 75: 0.58 L/S (ref 1.23–3.83)
POST FET 100: 7 SEC
POST FEV1 FVC: 51.66 % (ref 66.09–90.71)
POST FEV1: 1.54 L (ref 2.2–3.65)
POST FEV5: 1.09 L (ref 1.11–3.38)
POST FVC: 2.98 L (ref 2.84–4.62)
POST PEF: 4.13 L/S (ref 5.94–9.84)
PRE DLCO: 19 ML/(MIN*MMHG) (ref 17.12–30.97)
PRE FEF 25 75: 0.7 L/S (ref 1.23–3.83)
PRE FET 100: 7.28 SEC
PRE FEV05 REF: 42.9 %
PRE FEV1 FVC: 52.3 % (ref 66.09–90.71)
PRE FEV1: 1.48 L (ref 2.2–3.65)
PRE FEV5: 0.96 L (ref 1.11–3.38)
PRE FRC N2: 2.83 L
PRE FVC: 2.82 L (ref 2.84–4.62)
PRE PEF: 4.21 L/S (ref 5.94–9.84)
RVN2 LLN: 1.57
RVN2 PRE REF: 98.3 %
RVN2 PRE: 2.21 L (ref 1.57–2.92)
RVN2 REF: 2.25
RVN2TLCN2 LLN: 28.77
RVN2TLCN2 PRE REF: 123.2 %
RVN2TLCN2 PRE: 46.53 % (ref 28.77–46.73)
RVN2TLCN2 REF: 37.75
RVN2TLCN2ULN: 46.73
RVN2ULN: 2.92
TLCN2 LLN: 4.79
TLCN2 PRE REF: 79.9 %
TLCN2 PRE: 4.75 L (ref 4.79–7.1)
TLCN2 REF: 5.94
TLCN2ULN: 7.1
VA PRE: 4.31 L (ref 5.79–5.79)
VA SINGLE BREATH LLN: 5.79
VA SINGLE BREATH PRE REF: 74.4 %
VA SINGLE BREATH REF: 5.79
VASINGLEBREATHULN: 5.79
VCMAXN2 LLN: 2.84
VCMAXN2 PRE REF: 68.1 %
VCMAXN2 PRE: 2.54 L (ref 2.84–4.62)
VCMAXN2 REF: 3.73
VCMAXN2ULN: 4.62

## 2019-12-18 PROCEDURE — 99204 PR OFFICE/OUTPT VISIT, NEW, LEVL IV, 45-59 MIN: ICD-10-PCS | Mod: 25,S$PBB,, | Performed by: NURSE PRACTITIONER

## 2019-12-18 PROCEDURE — 99213 OFFICE O/P EST LOW 20 MIN: CPT | Mod: PBBFAC,25 | Performed by: NURSE PRACTITIONER

## 2019-12-18 PROCEDURE — 94060 PR EVAL OF BRONCHOSPASM: ICD-10-PCS | Mod: 26,S$PBB,, | Performed by: INTERNAL MEDICINE

## 2019-12-18 PROCEDURE — 94060 EVALUATION OF WHEEZING: CPT | Mod: 26,S$PBB,, | Performed by: INTERNAL MEDICINE

## 2019-12-18 PROCEDURE — 94727 GAS DIL/WSHOT DETER LNG VOL: CPT | Mod: PBBFAC | Performed by: INTERNAL MEDICINE

## 2019-12-18 PROCEDURE — 94727 GAS DIL/WSHOT DETER LNG VOL: CPT | Mod: 26,S$PBB,, | Performed by: INTERNAL MEDICINE

## 2019-12-18 PROCEDURE — 94729 DIFFUSING CAPACITY: CPT | Mod: 26,S$PBB,, | Performed by: INTERNAL MEDICINE

## 2019-12-18 PROCEDURE — 94729 DIFFUSING CAPACITY: CPT | Mod: PBBFAC | Performed by: INTERNAL MEDICINE

## 2019-12-18 PROCEDURE — 99999 PR PBB SHADOW E&M-EST. PATIENT-LVL III: CPT | Mod: PBBFAC,,, | Performed by: NURSE PRACTITIONER

## 2019-12-18 PROCEDURE — 99204 OFFICE O/P NEW MOD 45 MIN: CPT | Mod: 25,S$PBB,, | Performed by: NURSE PRACTITIONER

## 2019-12-18 PROCEDURE — 94729 PR C02/MEMBANE DIFFUSE CAPACITY: ICD-10-PCS | Mod: 26,S$PBB,, | Performed by: INTERNAL MEDICINE

## 2019-12-18 PROCEDURE — 94060 EVALUATION OF WHEEZING: CPT | Mod: PBBFAC | Performed by: INTERNAL MEDICINE

## 2019-12-18 PROCEDURE — 99999 PR PBB SHADOW E&M-EST. PATIENT-LVL III: ICD-10-PCS | Mod: PBBFAC,,, | Performed by: NURSE PRACTITIONER

## 2019-12-18 PROCEDURE — 94727 PR PULM FUNCTION TEST BY GAS: ICD-10-PCS | Mod: 26,S$PBB,, | Performed by: INTERNAL MEDICINE

## 2019-12-18 RX ORDER — DEXAMETHASONE 4 MG/1
TABLET ORAL
Refills: 0 | COMMUNITY
Start: 2019-12-05 | End: 2019-12-18 | Stop reason: ALTCHOICE

## 2019-12-18 RX ORDER — FLUTICASONE PROPIONATE AND SALMETEROL 250; 50 UG/1; UG/1
1 POWDER RESPIRATORY (INHALATION) 2 TIMES DAILY
Qty: 60 EACH | Refills: 6 | Status: SHIPPED | OUTPATIENT
Start: 2019-12-18 | End: 2020-08-26 | Stop reason: SDUPTHER

## 2019-12-18 RX ORDER — SALMETEROL XINAFOATE 50 UG/1
POWDER, METERED ORAL; RESPIRATORY (INHALATION)
Refills: 11 | COMMUNITY
Start: 2019-12-05 | End: 2019-12-18 | Stop reason: ALTCHOICE

## 2019-12-18 RX ORDER — PANTOPRAZOLE SODIUM 40 MG/1
40 TABLET, DELAYED RELEASE ORAL DAILY
Qty: 30 TABLET | Refills: 1 | Status: SHIPPED | OUTPATIENT
Start: 2019-12-18 | End: 2020-03-20 | Stop reason: SDUPTHER

## 2019-12-18 NOTE — LETTER
December 19, 2019      Zaida Ornelas MD  [address]  [place] [address]           [facility]  [address]  [place] [address]  Phone: [phone]          Patient: Nakul Britton   MR Number: [id number]   YOB: 1960   Date of Visit: 12/18/2019       Dear Dr. Zaida Ornelas:    Thank you for referring Nakul Britton to me for evaluation. Attached you will find relevant portions of my assessment and plan of care.    If you have questions, please do not hesitate to call me. I look forward to following Nakul Britton along with you.    Sincerely,    Alma Briseno, NP    Enclosure  CC:  No Recipients    If you would like to receive this communication electronically, please contact [email or web address] or [phone] to request more information on [employer] access.    For providers and/or their staff who would like to refer a patient to [facility], please contact us through our one-stop-shop provider referral line, Marie Spear, at [phone].    If you feel you have received this communication in error or would no longer like to receive these types of communications, please e-mail [email or web address]

## 2019-12-19 PROBLEM — K21.9 GASTROESOPHAGEAL REFLUX DISEASE: Status: ACTIVE | Noted: 2019-12-19

## 2019-12-19 NOTE — ASSESSMENT & PLAN NOTE
Concerns patient has been managing Flovent and Salmeterol on his own.     Plan:  -Stop Salmeterol and Flovent.   -Start Advair 250/50 1 puff twice a day. Rinse mouth after use. I explained in detail Advair is the combination of Salmeterol and Flovent. Based on his current need of ICS and LABA the Advair 250/50 is equivocal. He verbalizes understanding and agrees with new treatment plan.   -I suspect patient's asthma is not as well controlled based on spirometry suggestive of obstruction.   -Concerns for uncontrolled GERD could be influencing asthma. Will start on daily PPI.   -I will have patient follow up in appx 6-8 weeks after starting treatment plan. CXR prior to appt.

## 2019-12-19 NOTE — ASSESSMENT & PLAN NOTE
Concerns GERD maybe influencing asthma and that is why patient has been self managing his inhalers. He reports hx of hiatal hernia. He discloses taking Tums daily.     Plan:  -Start trial of daily PPI times one month. Patient to follow up.  -May consider Ambulatory referral to GI.

## 2019-12-19 NOTE — PROGRESS NOTES
Subjective:       Patient ID: Nakul Britton is a 59 y.o. male.    Chief Complaint: Asthma    HPI:     Nakul Britton is a 59 y.o. male with hx of asthma, never smoker, and HLD who presents for asthma medication refills. He explains has previously followed with Dr. Holcomb, but has not followed in years-new to me. He reports asthma has been controlled for years. Within the last 1 year, he denies any hospitalizations, ER, or UC visitations for respiratory illness. He denies wheezing, SOB, or chest discomfort. Currently, he reports using Flovent  110 mcg up to 4 puffs a day, Salmeterol 2 puffs a day and singular 10 mg daily. He admits taking more puffs of his inhalers than prescribed.  Discloses very hesitant on changing his inhalers because current medication regimen works well. States  tried Dulera in past- did not help. He identifies triggers as dust and weather changes. He finds allergies are controlled. He experiences daily acid reflux symptoms of mid chest burning up to throat. States having hiatal hernia- does not follow with GI. He takes daily Tums- which he reports alleviates symptoms.      Review of Systems   Constitutional: Negative for fever, chills, weight loss and night sweats.   HENT: Negative for postnasal drip, rhinorrhea, trouble swallowing and congestion.    Respiratory: Negative for cough, sputum production, choking, chest tightness, wheezing, dyspnea on extertion and use of rescue inhaler.    Cardiovascular: Negative for chest pain and leg swelling.   Musculoskeletal: Negative for joint swelling.   Skin: Negative for rash.   Gastrointestinal: Positive for acid reflux. Negative for abdominal pain.   Neurological: Negative for dizziness and light-headedness.   Hematological: Negative for adenopathy.   Psychiatric/Behavioral: Negative for sleep disturbance.       Social History     Tobacco Use    Smoking status: Never Smoker    Smokeless tobacco: Never Used   Substance Use Topics     "Alcohol use: Yes     Alcohol/week: 0.0 standard drinks     Comment: 4-6 times per year, socially     Review of patient's allergies indicates:   Allergen Reactions    Olive oil Rash     Past Medical History:   Diagnosis Date    Allergy     Asthma     Hiatal hernia      Past Surgical History:   Procedure Laterality Date    CATARACT EXTRACTION       Current Outpatient Medications on File Prior to Visit   Medication Sig    albuterol (PROVENTIL/VENTOLIN HFA) 90 mcg/actuation inhaler INHALE 2 PUFFS BY MOUTH FOUR TIMES DAILY AS NEEDED    montelukast (SINGULAIR) 10 mg tablet Take 1 tablet (10 mg total) by mouth every evening.    montelukast (SINGULAIR) 10 mg tablet TAKE 1 TABLET(10 MG) BY MOUTH EVERY EVENING    predniSONE (DELTASONE) 10 MG tablet TAKE 1 TO 2 TABLETS BY MOUTH ONCE DAILY    predniSONE (DELTASONE) 20 MG tablet TK 2 TS PO D FOR 5 DAYS    sildenafil (VIAGRA) 50 MG tablet Take 1 tablet (50 mg total) by mouth as needed.     No current facility-administered medications on file prior to visit.      Objective:      Vitals:    12/18/19 1431   BP: 124/78   BP Location: Left arm   Patient Position: Sitting   Pulse: 72   SpO2: 98%   Weight: 67.1 kg (148 lb)   Height: 5' 4" (1.626 m)     Physical Exam   Constitutional: He is oriented to person, place, and time. He appears well-developed and well-nourished. No distress.   HENT:   Head: Normocephalic.   Neck: Normal range of motion. Neck supple.   Cardiovascular: Normal rate, regular rhythm and normal heart sounds.   Pulmonary/Chest: Normal expansion, effort normal and breath sounds normal. No respiratory distress. He has no wheezes. He has no rhonchi.   Abdominal: Soft. Bowel sounds are normal. There is no hepatosplenomegaly.   Musculoskeletal: Normal range of motion. He exhibits no edema.   Lymphadenopathy: No supraclavicular adenopathy is present.     He has no cervical adenopathy.   Neurological: He is alert and oriented to person, place, and time. Gait " normal.   Skin: Skin is warm and dry. Nails show no clubbing.   Psychiatric: His behavior is normal. His mood appears anxious.   Vitals reviewed.    Personal Diagnostic Review    Pulmonary function tests 2019: FEV1: 1.48  (50 % predicted), FVC:  2.82 (76 % predicted), FEV1/FVC:  52, T.75 (80 % predicted), DLCO: 19 (79 % predicted)  Post BD   FEV1: 1.54  (53 % predicted), FVC:  2.98 (80 % predicted), FEV1/FVC:  52,    Assessment:     Problem List Items Addressed This Visit        Pulmonary    Mild intermittent asthma without complication - Primary    Overview     Spirometry shows significant obstruction. Lung volume determination shows mild restriction is also present.  Overall there is moderate ventilatory impairment. Spirometry remains unimproved following bronchodilator. DLCO  is normal.         Current Assessment & Plan     Concerns patient has been managing Flovent and Salmeterol on his own.     Plan:  -Stop Salmeterol and Flovent.   -Start Advair 250/50 1 puff twice a day. Rinse mouth after use. I explained in detail Advair is the combination of Salmeterol and Flovent. Based on his current need of ICS and LABA the Advair 250/50 is equivocal. He verbalizes understanding and agrees with new treatment plan.   -I suspect patient's asthma is not as well controlled based on spirometry suggestive of obstruction.   -Concerns for uncontrolled GERD could be influencing asthma. Will start on daily PPI.   -I will have patient follow up in appx 6-8 weeks after starting treatment plan. CXR prior to appt.          Relevant Medications    fluticasone-salmeterol diskus inhaler 250-50 mcg    Other Relevant Orders    X-Ray Chest PA And Lateral       GI    Gastroesophageal reflux disease    Current Assessment & Plan     Concerns GERD maybe influencing asthma and that is why patient has been self managing his inhalers. He reports hx of hiatal hernia. He discloses taking Tums daily.     Plan:  -Start trial of daily PPI  times one month. Patient to follow up.  -May consider Ambulatory referral to GI.          Relevant Medications    pantoprazole (PROTONIX) 40 MG tablet      Follow up in 6-8 weeks.

## 2019-12-27 RX ORDER — PREDNISONE 10 MG/1
TABLET ORAL
Qty: 60 TABLET | Refills: 0 | Status: SHIPPED | OUTPATIENT
Start: 2019-12-27 | End: 2020-12-04 | Stop reason: ALTCHOICE

## 2020-01-02 ENCOUNTER — TELEPHONE (OUTPATIENT)
Dept: PULMONOLOGY | Facility: CLINIC | Age: 60
End: 2020-01-02

## 2020-01-02 NOTE — TELEPHONE ENCOUNTER
A voicemail was left for Mr. Britton at 10:46am, patient was informed that I have received his message & was returning his call from JASON Diaz office from [facility] in Pulmonary Department. My name and call back number was also left for Mr. Britton to return my call back at the office.

## 2020-01-02 NOTE — TELEPHONE ENCOUNTER
----- Message from Allyson Snow sent at 1/2/2020  9:35 AM CST -----  Contact:   Pt  [phone]  Pt called stating that an prior authorization is needed for his Advair medication,   Call the pt back to verify .. Pt still has not received his medication

## 2020-01-03 DIAGNOSIS — J45.20 MILD INTERMITTENT ASTHMA WITHOUT COMPLICATION: ICD-10-CM

## 2020-01-04 RX ORDER — FLUTICASONE PROPIONATE 110 UG/1
AEROSOL, METERED RESPIRATORY (INHALATION)
Qty: 12 G | Refills: 0 | Status: SHIPPED | OUTPATIENT
Start: 2020-01-04 | End: 2020-01-23

## 2020-01-23 DIAGNOSIS — J45.20 MILD INTERMITTENT ASTHMA WITHOUT COMPLICATION: ICD-10-CM

## 2020-01-23 RX ORDER — FLUTICASONE PROPIONATE 110 UG/1
AEROSOL, METERED RESPIRATORY (INHALATION)
Qty: 12 G | Refills: 0 | Status: SHIPPED | OUTPATIENT
Start: 2020-01-23 | End: 2020-08-26 | Stop reason: ALTCHOICE

## 2020-03-20 DIAGNOSIS — K21.9 GASTROESOPHAGEAL REFLUX DISEASE, ESOPHAGITIS PRESENCE NOT SPECIFIED: ICD-10-CM

## 2020-03-20 RX ORDER — PANTOPRAZOLE SODIUM 40 MG/1
40 TABLET, DELAYED RELEASE ORAL DAILY
Qty: 30 TABLET | Refills: 1 | Status: SHIPPED | OUTPATIENT
Start: 2020-03-20 | End: 2020-12-14

## 2020-04-12 DIAGNOSIS — J45.40 MODERATE PERSISTENT ASTHMA WITHOUT COMPLICATION: ICD-10-CM

## 2020-04-13 RX ORDER — ALBUTEROL SULFATE 90 UG/1
AEROSOL, METERED RESPIRATORY (INHALATION)
Qty: 18 G | Refills: 2 | Status: SHIPPED | OUTPATIENT
Start: 2020-04-13 | End: 2020-05-17

## 2020-05-04 ENCOUNTER — TELEPHONE (OUTPATIENT)
Dept: INTERNAL MEDICINE | Facility: CLINIC | Age: 60
End: 2020-05-04

## 2020-05-04 DIAGNOSIS — N52.9 ERECTILE DYSFUNCTION, UNSPECIFIED ERECTILE DYSFUNCTION TYPE: ICD-10-CM

## 2020-05-04 RX ORDER — SILDENAFIL CITRATE 50 MG/1
50 TABLET, FILM COATED ORAL
Qty: 6 TABLET | Refills: 0 | Status: SHIPPED | OUTPATIENT
Start: 2020-05-04 | End: 2020-05-05 | Stop reason: ALTCHOICE

## 2020-05-04 NOTE — TELEPHONE ENCOUNTER
Pt has been taking generic sildenafil.  States it's not working as well as it did.    Wants to try Brand Name Viagra    To be sent to [facility]     Quantity of 6 pills. No refills.    Only wants a few pills, to see how it works.  Then he'll order the larger quantity if it's better than generic.

## 2020-05-04 NOTE — TELEPHONE ENCOUNTER
----- Message from Adilia Casas sent at 5/4/2020  4:46 PM CDT -----  Contact: [employer]   Pharmacy is calling to clarify an RX.  RX name:  VIAGRA 50 mg tablet  What do they need to clarify:  Rx stated brand and the Rx is $500. Pharmacy advising generic.  Comments:

## 2020-05-04 NOTE — TELEPHONE ENCOUNTER
Spoke to Colette.    They had spoken to pt and cost is too much.  Colette suggested a different dosage of the sildenafil, since the patient states it's not working as well as when he first started using it.    Available dosages are:  25mg, 50mg and 100mg.    Pt currently using 50mg.    Please advise.

## 2020-05-05 RX ORDER — SILDENAFIL 100 MG/1
100 TABLET, FILM COATED ORAL DAILY PRN
Qty: 30 TABLET | Refills: 0 | Status: SHIPPED | OUTPATIENT
Start: 2020-05-05 | End: 2020-08-28

## 2020-05-05 NOTE — TELEPHONE ENCOUNTER
Spoke to pt.    He has tried 2 50mg tablets at once and he said, that's the only way it works.    Would like new Rx for Sildenafil 100mg, quantity of 30, sent to [facility].    He wants the higher quantity to get lower price.

## 2020-05-05 NOTE — TELEPHONE ENCOUNTER
Please find out if patient has tried taking 2 pills at once of the old Viagra. If he still has a few pills, should try that first.    If he does not want to pay for the brand Viagra, would switch to generic Cialis. Cost at [facility] is a few dollars higher.

## 2020-05-14 ENCOUNTER — TELEPHONE (OUTPATIENT)
Dept: INTERNAL MEDICINE | Facility: CLINIC | Age: 60
End: 2020-05-14

## 2020-05-14 NOTE — TELEPHONE ENCOUNTER
PA request received from [facility] [employer] for:    Albuterol HFA Inh    Pt has 2 refills on this Rx.    Called pharmacy to check on reason for PA.    Per pharmacist, Medicaid is requesting PA's on all inhalers.    PA initiated via phone with [facility] [phone]    Reference # [id number]    Response will be faxed.

## 2020-05-15 ENCOUNTER — TELEPHONE (OUTPATIENT)
Dept: INTERNAL MEDICINE | Facility: CLINIC | Age: 60
End: 2020-05-15

## 2020-05-15 NOTE — TELEPHONE ENCOUNTER
Fax received from [facility]    PA denied for Albuterol Aer HFA inhaler    Preferred:  Albuterol sulfate mdi (ProAir HFA, Proventil HFA)    Please send new Rx

## 2020-05-17 RX ORDER — ALBUTEROL SULFATE 90 UG/1
2 AEROSOL, METERED RESPIRATORY (INHALATION) 4 TIMES DAILY PRN
Qty: 18 G | Refills: 5 | Status: SHIPPED | OUTPATIENT
Start: 2020-05-17 | End: 2020-09-23

## 2020-08-22 DIAGNOSIS — Z12.11 COLON CANCER SCREENING: ICD-10-CM

## 2020-08-26 DIAGNOSIS — J45.20 MILD INTERMITTENT ASTHMA WITHOUT COMPLICATION: ICD-10-CM

## 2020-08-26 RX ORDER — FLUTICASONE PROPIONATE AND SALMETEROL 250; 50 UG/1; UG/1
1 POWDER RESPIRATORY (INHALATION) 2 TIMES DAILY
Qty: 60 EACH | Refills: 6 | Status: SHIPPED | OUTPATIENT
Start: 2020-08-26 | End: 2021-05-26 | Stop reason: SDUPTHER

## 2020-08-26 NOTE — TELEPHONE ENCOUNTER
Thanks Criss.    I have followed with patient since December. Can you have him follow up in few months.

## 2020-08-28 ENCOUNTER — TELEPHONE (OUTPATIENT)
Dept: INTERNAL MEDICINE | Facility: CLINIC | Age: 60
End: 2020-08-28

## 2020-08-28 RX ORDER — SILDENAFIL 100 MG/1
TABLET, FILM COATED ORAL
Qty: 30 TABLET | Refills: 0 | Status: SHIPPED | OUTPATIENT
Start: 2020-08-28 | End: 2020-12-14 | Stop reason: SDUPTHER

## 2020-09-14 ENCOUNTER — NURSE TRIAGE (OUTPATIENT)
Dept: ADMINISTRATIVE | Facility: CLINIC | Age: 60
End: 2020-09-14

## 2020-09-14 DIAGNOSIS — J45.40 MODERATE PERSISTENT ASTHMA WITHOUT COMPLICATION: ICD-10-CM

## 2020-09-14 RX ORDER — MONTELUKAST SODIUM 10 MG/1
10 TABLET ORAL NIGHTLY
Qty: 30 TABLET | Refills: 0 | Status: SHIPPED | OUTPATIENT
Start: 2020-09-14 | End: 2020-12-14 | Stop reason: SDUPTHER

## 2020-09-15 NOTE — TELEPHONE ENCOUNTER
Pt had another Dr fill a 30d Rx , which insurance said they would pay.    Then he found the lost Rx.    All good.

## 2020-09-15 NOTE — TELEPHONE ENCOUNTER
"Pt calling to have Montelukast refilled. Pt got his prescription refilled yesterday, but lost it. Patient is out of refills. Spoke to Dr. Green with Internal Medicine. He gave telephone order with read back to give patient one time 30 day supply of 10 mg Montelukast.   Reason for Disposition   [1] Request for URGENT new prescription or refill of "essential" medication (i.e., likelihood of harm to patient if not taken) AND [2] triager unable to fill per unit policy    Additional Information   Negative: Drug overdose and triager unable to answer question   Negative: Caller requesting information unrelated to medicine   Negative: Caller requesting a prescription for Strep throat and has a positive culture result   Negative: Rash while taking a medication or within 3 days of stopping it   Negative: Immunization reaction suspected   Negative: [1] Asthma and [2] having symptoms of asthma (cough, wheezing, etc.)   Negative: [1] Influenza symptoms AND [2] anti-viral med prescription request, such as Tamiflu   Negative: [1] Symptom of illness (e.g., headache, abdominal pain, earache, vomiting) AND [2] more than mild   Negative: [1] DOUBLE DOSE (an extra dose or lesser amount) of prescription drug AND [2] NO symptoms (Exception: a double dose of antibiotics)   Negative: Diabetes drug error or overdose (e.g., took wrong type of insulin or took extra dose)    Protocols used: MEDICATION QUESTION CALL-A-      "

## 2020-09-23 RX ORDER — ALBUTEROL SULFATE 90 UG/1
AEROSOL, METERED RESPIRATORY (INHALATION)
Qty: 8.5 G | Refills: 0 | Status: SHIPPED | OUTPATIENT
Start: 2020-09-23 | End: 2020-12-31 | Stop reason: SDUPTHER

## 2020-10-05 ENCOUNTER — PATIENT MESSAGE (OUTPATIENT)
Dept: ADMINISTRATIVE | Facility: HOSPITAL | Age: 60
End: 2020-10-05

## 2020-10-14 DIAGNOSIS — Z00.00 ANNUAL PHYSICAL EXAM: Primary | ICD-10-CM

## 2020-10-14 DIAGNOSIS — R73.03 PRE-DIABETES: ICD-10-CM

## 2020-10-14 RX ORDER — ALBUTEROL SULFATE 90 UG/1
AEROSOL, METERED RESPIRATORY (INHALATION)
Qty: 8.5 G | Refills: 0 | OUTPATIENT
Start: 2020-10-14

## 2020-12-01 ENCOUNTER — TELEPHONE (OUTPATIENT)
Dept: INTERNAL MEDICINE | Facility: CLINIC | Age: 60
End: 2020-12-01

## 2020-12-01 RX ORDER — PREDNISONE 10 MG/1
TABLET ORAL
Qty: 60 TABLET | Refills: 0 | Status: CANCELLED | OUTPATIENT
Start: 2020-12-01

## 2020-12-01 NOTE — TELEPHONE ENCOUNTER
----- Message from Dee Jacob sent at 12/1/2020 12:43 PM CST -----  Contact: self [phone]  Pt would like covid antibody test added to his current labs on 12/9. Please call and advise. Thank you

## 2020-12-01 NOTE — TELEPHONE ENCOUNTER
Pt notified and verbalized understanding.    Also notified that he will need to contact Dr Holcomb or pulmonary for refill of prednisone

## 2020-12-01 NOTE — TELEPHONE ENCOUNTER
----- Message from Joseline Ayala sent at 12/1/2020  4:48 PM CST -----  Regarding: Prescription Refill  predniSONE (DELTASONE) 10 MG tablet    [facility][id number] [facility]IAN [facility]ARROS [facility] [address]

## 2020-12-02 ENCOUNTER — PATIENT MESSAGE (OUTPATIENT)
Dept: PULMONOLOGY | Facility: CLINIC | Age: 60
End: 2020-12-02

## 2020-12-02 NOTE — TELEPHONE ENCOUNTER
Bhargavi,     This medication was ordered by Dr. Holcomb.   I last followed with patient 1 year ago.   He needs a follow up visitation.   I can see him tomorrow.     Please call patient to discuss,   Thanks,  Alma

## 2020-12-03 ENCOUNTER — OFFICE VISIT (OUTPATIENT)
Dept: PULMONOLOGY | Facility: CLINIC | Age: 60
End: 2020-12-03
Payer: MEDICAID

## 2020-12-03 VITALS
OXYGEN SATURATION: 97 % | HEART RATE: 77 BPM | WEIGHT: 147.94 LBS | SYSTOLIC BLOOD PRESSURE: 121 MMHG | HEIGHT: 64 IN | BODY MASS INDEX: 25.25 KG/M2 | DIASTOLIC BLOOD PRESSURE: 78 MMHG

## 2020-12-03 DIAGNOSIS — J45.20 MILD INTERMITTENT ASTHMA WITHOUT COMPLICATION: Primary | ICD-10-CM

## 2020-12-03 PROCEDURE — 99999 PR PBB SHADOW E&M-EST. PATIENT-LVL III: ICD-10-PCS | Mod: PBBFAC,,, | Performed by: NURSE PRACTITIONER

## 2020-12-03 PROCEDURE — 99213 OFFICE O/P EST LOW 20 MIN: CPT | Mod: PBBFAC | Performed by: NURSE PRACTITIONER

## 2020-12-03 PROCEDURE — 99213 PR OFFICE/OUTPT VISIT, EST, LEVL III, 20-29 MIN: ICD-10-PCS | Mod: S$PBB,,, | Performed by: NURSE PRACTITIONER

## 2020-12-03 PROCEDURE — 99213 OFFICE O/P EST LOW 20 MIN: CPT | Mod: S$PBB,,, | Performed by: NURSE PRACTITIONER

## 2020-12-03 PROCEDURE — 99999 PR PBB SHADOW E&M-EST. PATIENT-LVL III: CPT | Mod: PBBFAC,,, | Performed by: NURSE PRACTITIONER

## 2020-12-04 RX ORDER — PREDNISONE 10 MG/1
TABLET ORAL
Qty: 20 TABLET | Refills: 0 | Status: SHIPPED | OUTPATIENT
Start: 2020-12-04 | End: 2020-12-14

## 2020-12-09 ENCOUNTER — LAB VISIT (OUTPATIENT)
Dept: LAB | Facility: HOSPITAL | Age: 60
End: 2020-12-09
Attending: INTERNAL MEDICINE
Payer: MEDICAID

## 2020-12-09 DIAGNOSIS — Z00.00 ANNUAL PHYSICAL EXAM: ICD-10-CM

## 2020-12-09 DIAGNOSIS — R73.03 PRE-DIABETES: ICD-10-CM

## 2020-12-09 LAB
BASOPHILS # BLD AUTO: 0.05 K/UL (ref 0–0.2)
BASOPHILS NFR BLD: 0.8 % (ref 0–1.9)
DIFFERENTIAL METHOD: ABNORMAL
EOSINOPHIL # BLD AUTO: 0.2 K/UL (ref 0–0.5)
EOSINOPHIL NFR BLD: 3.4 % (ref 0–8)
ERYTHROCYTE [DISTWIDTH] IN BLOOD BY AUTOMATED COUNT: 15.6 % (ref 11.5–14.5)
ESTIMATED AVG GLUCOSE: 120 MG/DL (ref 68–131)
HBA1C MFR BLD HPLC: 5.8 % (ref 4–5.6)
HCT VFR BLD AUTO: 48.1 % (ref 40–54)
HGB BLD-MCNC: 15 G/DL (ref 14–18)
IMM GRANULOCYTES # BLD AUTO: 0.02 K/UL (ref 0–0.04)
IMM GRANULOCYTES NFR BLD AUTO: 0.3 % (ref 0–0.5)
LYMPHOCYTES # BLD AUTO: 1.8 K/UL (ref 1–4.8)
LYMPHOCYTES NFR BLD: 28.7 % (ref 18–48)
MCH RBC QN AUTO: 28.5 PG (ref 27–31)
MCHC RBC AUTO-ENTMCNC: 31.2 G/DL (ref 32–36)
MCV RBC AUTO: 91 FL (ref 82–98)
MONOCYTES # BLD AUTO: 0.7 K/UL (ref 0.3–1)
MONOCYTES NFR BLD: 11.6 % (ref 4–15)
NEUTROPHILS # BLD AUTO: 3.5 K/UL (ref 1.8–7.7)
NEUTROPHILS NFR BLD: 55.2 % (ref 38–73)
NRBC BLD-RTO: 0 /100 WBC
PLATELET # BLD AUTO: 300 K/UL (ref 150–350)
PMV BLD AUTO: 10 FL (ref 9.2–12.9)
RBC # BLD AUTO: 5.27 M/UL (ref 4.6–6.2)
WBC # BLD AUTO: 6.38 K/UL (ref 3.9–12.7)

## 2020-12-09 PROCEDURE — 36415 COLL VENOUS BLD VENIPUNCTURE: CPT | Mod: PO

## 2020-12-09 PROCEDURE — 84443 ASSAY THYROID STIM HORMONE: CPT

## 2020-12-09 PROCEDURE — 80053 COMPREHEN METABOLIC PANEL: CPT

## 2020-12-09 PROCEDURE — 83036 HEMOGLOBIN GLYCOSYLATED A1C: CPT

## 2020-12-09 PROCEDURE — 85025 COMPLETE CBC W/AUTO DIFF WBC: CPT

## 2020-12-09 PROCEDURE — 80061 LIPID PANEL: CPT

## 2020-12-09 PROCEDURE — 86769 SARS-COV-2 COVID-19 ANTIBODY: CPT

## 2020-12-10 ENCOUNTER — TELEPHONE (OUTPATIENT)
Dept: INTERNAL MEDICINE | Facility: CLINIC | Age: 60
End: 2020-12-10

## 2020-12-10 LAB
ALBUMIN SERPL BCP-MCNC: 4.1 G/DL (ref 3.5–5.2)
ALP SERPL-CCNC: 78 U/L (ref 55–135)
ALT SERPL W/O P-5'-P-CCNC: 24 U/L (ref 10–44)
ANION GAP SERPL CALC-SCNC: 11 MMOL/L (ref 8–16)
AST SERPL-CCNC: 23 U/L (ref 10–40)
BILIRUB SERPL-MCNC: 0.4 MG/DL (ref 0.1–1)
BUN SERPL-MCNC: 13 MG/DL (ref 8–23)
CALCIUM SERPL-MCNC: 9.2 MG/DL (ref 8.7–10.5)
CHLORIDE SERPL-SCNC: 102 MMOL/L (ref 95–110)
CHOLEST SERPL-MCNC: 225 MG/DL (ref 120–199)
CHOLEST/HDLC SERPL: 5.4 {RATIO} (ref 2–5)
CO2 SERPL-SCNC: 24 MMOL/L (ref 23–29)
CREAT SERPL-MCNC: 0.9 MG/DL (ref 0.5–1.4)
EST. GFR  (AFRICAN AMERICAN): >60 ML/MIN/1.73 M^2
EST. GFR  (NON AFRICAN AMERICAN): >60 ML/MIN/1.73 M^2
GLUCOSE SERPL-MCNC: 100 MG/DL (ref 70–110)
HDLC SERPL-MCNC: 42 MG/DL (ref 40–75)
HDLC SERPL: 18.7 % (ref 20–50)
LDLC SERPL CALC-MCNC: 152 MG/DL (ref 63–159)
NONHDLC SERPL-MCNC: 183 MG/DL
POTASSIUM SERPL-SCNC: 4.6 MMOL/L (ref 3.5–5.1)
PROT SERPL-MCNC: 7.7 G/DL (ref 6–8.4)
SARS-COV-2 IGG SERPLBLD QL IA.RAPID: NEGATIVE
SODIUM SERPL-SCNC: 137 MMOL/L (ref 136–145)
TRIGL SERPL-MCNC: 155 MG/DL (ref 30–150)
TSH SERPL DL<=0.005 MIU/L-ACNC: 1.84 UIU/ML (ref 0.4–4)

## 2020-12-10 NOTE — TELEPHONE ENCOUNTER
----- Message from Zaida Ornelas MD sent at 12/9/2020  8:29 PM CST -----  Message sent via patient portal.

## 2020-12-14 ENCOUNTER — OFFICE VISIT (OUTPATIENT)
Dept: INTERNAL MEDICINE | Facility: CLINIC | Age: 60
End: 2020-12-14
Payer: MEDICAID

## 2020-12-14 VITALS
TEMPERATURE: 98 F | RESPIRATION RATE: 18 BRPM | WEIGHT: 148.38 LBS | OXYGEN SATURATION: 97 % | BODY MASS INDEX: 25.33 KG/M2 | SYSTOLIC BLOOD PRESSURE: 144 MMHG | DIASTOLIC BLOOD PRESSURE: 82 MMHG | HEIGHT: 64 IN | HEART RATE: 78 BPM

## 2020-12-14 DIAGNOSIS — J45.40 MODERATE PERSISTENT ASTHMA WITHOUT COMPLICATION: ICD-10-CM

## 2020-12-14 DIAGNOSIS — E78.2 MIXED HYPERLIPIDEMIA: ICD-10-CM

## 2020-12-14 DIAGNOSIS — Z00.00 ANNUAL PHYSICAL EXAM: Primary | ICD-10-CM

## 2020-12-14 DIAGNOSIS — R03.0 ELEVATED BLOOD PRESSURE READING: ICD-10-CM

## 2020-12-14 DIAGNOSIS — N52.9 ERECTILE DYSFUNCTION, UNSPECIFIED ERECTILE DYSFUNCTION TYPE: ICD-10-CM

## 2020-12-14 DIAGNOSIS — K21.9 GASTROESOPHAGEAL REFLUX DISEASE, UNSPECIFIED WHETHER ESOPHAGITIS PRESENT: ICD-10-CM

## 2020-12-14 DIAGNOSIS — Z12.11 SCREEN FOR COLON CANCER: ICD-10-CM

## 2020-12-14 DIAGNOSIS — R73.03 PRE-DIABETES: ICD-10-CM

## 2020-12-14 PROCEDURE — 99999 PR PBB SHADOW E&M-EST. PATIENT-LVL IV: CPT | Mod: PBBFAC,,, | Performed by: INTERNAL MEDICINE

## 2020-12-14 PROCEDURE — 99214 PR OFFICE/OUTPT VISIT, EST, LEVL IV, 30-39 MIN: ICD-10-PCS | Mod: S$PBB,,, | Performed by: INTERNAL MEDICINE

## 2020-12-14 PROCEDURE — 99214 OFFICE O/P EST MOD 30 MIN: CPT | Mod: S$PBB,,, | Performed by: INTERNAL MEDICINE

## 2020-12-14 PROCEDURE — 99214 OFFICE O/P EST MOD 30 MIN: CPT | Mod: PBBFAC,PO | Performed by: INTERNAL MEDICINE

## 2020-12-14 PROCEDURE — 99999 PR PBB SHADOW E&M-EST. PATIENT-LVL IV: ICD-10-PCS | Mod: PBBFAC,,, | Performed by: INTERNAL MEDICINE

## 2020-12-14 RX ORDER — SILDENAFIL 100 MG/1
TABLET, FILM COATED ORAL
Qty: 30 TABLET | Refills: 5 | Status: SHIPPED | OUTPATIENT
Start: 2020-12-14 | End: 2022-03-17

## 2020-12-14 RX ORDER — MONTELUKAST SODIUM 10 MG/1
10 TABLET ORAL NIGHTLY
Qty: 30 TABLET | Refills: 5 | Status: SHIPPED | OUTPATIENT
Start: 2020-12-14 | End: 2021-06-22

## 2020-12-14 NOTE — PROGRESS NOTES
Subjective:      Nakul Britton is a 60 y.o. male who presents for annual exam.    Elevated blood pressure- patient denies family history HTN.  Last few blood pressure readings were within normal range.  He reports taking an antihistamine/decongestant daily as needed for allergic rhinitis and used 1 dose today.  He denies use of intranasal decongestants.  He denies chest pain, no headaches, no leg edema.    Hyperlipidemia- discussed the patient's recent lipid panel.  Total cholesterol is borderline high and in the last 3 years, LDL level has increased to 152.  He denies family history of cardiovascular disease.  He has never taken medication lower cholesterol level.    Asthma- he was previously managed by Dr. Holcomb and his symptoms were stable with use of Advair and albuterol as needed.  He reports that he feels better when he has a course prednisone at home.  He reports that symptoms are controlled.  No cough, no shortness of breath and no wheezing.    Family History:  family history includes No Known Problems in his father and mother.    Health Maintenance:  Health Maintenance       Date Due Completion Date    HIV Screening 11/24/1973 ---    TETANUS VACCINE 11/24/1976 ---    Pneumococcal Vaccine (Medium Risk) (1 of 1 - PPSV23) 11/24/1977 ---    Shingles Vaccine (1 of 2) 11/24/2008 ---    Colorectal Cancer Screening 11/24/2008 ---    Influenza Vaccine (1) 08/01/2020 ---    Lipid Panel 12/09/2025 12/9/2020        Eye exam: due  Dental Exam: due  Influenza: done  Shingrix: due  FitKit: patient will complete this and return it in 1 week    Body mass index is 25.47 kg/m².      Meds:   Current Outpatient Medications:     fluticasone-salmeterol diskus inhaler 250-50 mcg, Inhale 1 puff into the lungs 2 (two) times daily. Controller, Disp: 60 each, Rfl: 6    montelukast (SINGULAIR) 10 mg tablet, Take 1 tablet (10 mg total) by mouth every evening., Disp: 30 tablet, Rfl: 5    PROAIR HFA 90 mcg/actuation inhaler,  INHALE 2 PUFFS BY MOUTH FOUR TIMES DAILY AS NEEDED FOR WHEEZING OR SHORTNESS OF BREATH, Disp: 8.5 g, Rfl: 0    sildenafiL (VIAGRA) 100 MG tablet, TAKE ONE DAILY AS NEEDED FOR ERECTILE DYSFUNCTION, Disp: 30 tablet, Rfl: 5    PMHx:   Past Medical History:   Diagnosis Date    Allergy     Asthma     Hiatal hernia        PSHx:  Past Surgical History:   Procedure Laterality Date    CATARACT EXTRACTION         SocHx:   Social History     Socioeconomic History    Marital status: Single     Spouse name: Not on file    Number of children: Not on file    Years of education: Not on file    Highest education level: Not on file   Occupational History    Not on file   Social Needs    Financial resource strain: Not on file    Food insecurity     Worry: Not on file     Inability: Not on file    Transportation needs     Medical: Not on file     Non-medical: Not on file   Tobacco Use    Smoking status: Never Smoker    Smokeless tobacco: Never Used   Substance and Sexual Activity    Alcohol use: Yes     Alcohol/week: 0.0 standard drinks     Comment: 4-6 times per year, socially    Drug use: No    Sexual activity: Yes     Partners: Female   Lifestyle    Physical activity     Days per week: Not on file     Minutes per session: Not on file    Stress: Not on file   Relationships    Social connections     Talks on phone: Not on file     Gets together: Not on file     Attends Rastafarian service: Not on file     Active member of club or organization: Not on file     Attends meetings of clubs or organizations: Not on file     Relationship status: Not on file   Other Topics Concern    Not on file   Social History Narrative    Works as a window washer       Review of Systems   Constitutional: Positive for fatigue. Negative for chills, diaphoresis and fever.   HENT: Positive for rhinorrhea and sneezing. Negative for congestion, dental problem, ear discharge, ear pain, postnasal drip, sinus pressure, sinus pain, sore throat  and trouble swallowing.         Nasal symptoms are relieved by Claritin D   Eyes: Positive for itching. Negative for discharge, redness and visual disturbance.   Respiratory: Positive for cough. Negative for chest tightness and shortness of breath.    Cardiovascular: Negative for chest pain, palpitations and leg swelling.   Gastrointestinal: Negative for abdominal pain, constipation, diarrhea, nausea and vomiting.   Endocrine: Negative for polydipsia and polyuria.   Genitourinary: Negative for dysuria, frequency, hematuria and urgency.   Musculoskeletal: Negative for arthralgias, back pain and myalgias.   Skin: Negative for rash and wound.   Neurological: Negative for dizziness, weakness, numbness and headaches.   Hematological: Negative for adenopathy.   Psychiatric/Behavioral: Negative for dysphoric mood and sleep disturbance. The patient is not nervous/anxious.        Objective:      Physical Exam  Vitals signs reviewed.   Constitutional:       General: He is not in acute distress.     Appearance: He is well-developed.   HENT:      Head: Normocephalic and atraumatic.      Right Ear: Hearing, tympanic membrane, ear canal and external ear normal. Tympanic membrane is not erythematous or bulging.      Left Ear: Hearing, tympanic membrane, ear canal and external ear normal. Tympanic membrane is not erythematous or bulging.      Nose: Nose normal.      Mouth/Throat:      Mouth: Mucous membranes are moist.      Pharynx: Oropharynx is clear. Uvula midline. No pharyngeal swelling, oropharyngeal exudate or posterior oropharyngeal erythema.   Eyes:      General: Lids are normal. No scleral icterus.     Conjunctiva/sclera: Conjunctivae normal.      Pupils: Pupils are equal, round, and reactive to light.   Neck:      Musculoskeletal: Normal range of motion and neck supple.      Thyroid: No thyroid mass or thyromegaly.   Cardiovascular:      Rate and Rhythm: Normal rate and regular rhythm.      Pulses: Normal pulses.       Heart sounds: Normal heart sounds. No murmur.   Pulmonary:      Effort: Pulmonary effort is normal. No tachypnea, bradypnea, accessory muscle usage or respiratory distress.      Breath sounds: Normal breath sounds. No decreased breath sounds or wheezing.   Abdominal:      General: Bowel sounds are normal. There is no distension.      Palpations: Abdomen is soft. Abdomen is not rigid.      Tenderness: There is no abdominal tenderness. There is no guarding or rebound.   Musculoskeletal: Normal range of motion.   Lymphadenopathy:      Cervical: No cervical adenopathy.      Upper Body:      Right upper body: No supraclavicular adenopathy.      Left upper body: No supraclavicular adenopathy.   Skin:     General: Skin is warm and dry.      Findings: No rash.   Neurological:      Mental Status: He is alert and oriented to person, place, and time.      Coordination: Coordination normal.      Gait: Gait normal.      Deep Tendon Reflexes: Reflexes are normal and symmetric. Reflexes normal.   Psychiatric:         Attention and Perception: Attention normal.         Mood and Affect: Mood normal.         Assessment:       1. Annual physical exam    2. Elevated blood pressure reading    3. Moderate persistent asthma without complication    4. Mixed hyperlipidemia    5. Gastroesophageal reflux disease, unspecified whether esophagitis present    6. Erectile dysfunction, unspecified erectile dysfunction type    7. Pre-diabetes    8. Screen for colon cancer        Plan:       1. Annual physical exam  - reviewed recent lab results with the patient  - advised him to request a shingles vaccine from his pharmacy    2. Elevated blood pressure reading  - blood pressure elevation could be related to decongestant use  - stop Claritin-D and start plain Claritin, avoid decongestants  - will repeat blood pressure in the next 2 weeks    3. Moderate persistent asthma without complication  - stable, recently evaluated by pulmonology, continue to  -  montelukast (SINGULAIR) 10 mg tablet; Take 1 tablet (10 mg total) by mouth every evening.  Dispense: 30 tablet; Refill: 5    4. Mixed hyperlipidemia  - borderline high total cholesterol level and LDL level, reduce fat intake  - discussed the increased risk of cardiovascular disease with ASCVD of 15% but patient is not interested in starting a new medication    5. Gastroesophageal reflux disease, unspecified whether esophagitis present  - stable, no improvement with Protonix so he stopped  - may continue Tums as needed, avoid triggers    6. Erectile dysfunction, unspecified erectile dysfunction type  - sildenafiL (VIAGRA) 100 MG tablet; TAKE ONE DAILY AS NEEDED FOR ERECTILE DYSFUNCTION  Dispense: 30 tablet; Refill: 5    7. Pre-diabetes  - hemoglobin A1c has been stable between 5.8 and 6.1  - avoid sugar intake    8. Screen for colon cancer  - patient agrees to submit FitKit within the next week    RTC in 1 year for annual exam or sooner if needed    Zaida Ornelas MD  Internal Medicine, [facility]

## 2020-12-14 NOTE — PATIENT INSTRUCTIONS
Stop Claritin D and take plain Claritin    Check blood pressure in 2 weeks and call us with the reading

## 2020-12-16 ENCOUNTER — TELEPHONE (OUTPATIENT)
Dept: INTERNAL MEDICINE | Facility: CLINIC | Age: 60
End: 2020-12-16

## 2020-12-17 ENCOUNTER — LAB VISIT (OUTPATIENT)
Dept: LAB | Facility: HOSPITAL | Age: 60
End: 2020-12-17
Attending: INTERNAL MEDICINE
Payer: MEDICAID

## 2020-12-17 DIAGNOSIS — Z12.11 COLON CANCER SCREENING: ICD-10-CM

## 2020-12-17 PROCEDURE — 82274 ASSAY TEST FOR BLOOD FECAL: CPT

## 2020-12-17 NOTE — ASSESSMENT & PLAN NOTE
Pt asthma is currently stable with no increases in SOB, COLINDRES or wheezing.  There has been no increase in use of  rescue medications.  Have reviewed medications with pt including the roles of rescue and controlling medications.  All questions answered.  Pt reports no problems with technique with inhalers.  We discussed the possibility of de-escalation of therapy if asthma control remains stable.    ACT - 22    Plan:  -continue Advair 250/51 puff twice a day.  Discussed the rinse mouth after use.  -continue to use albuterol as needed for shortness of breath or wheezing  -continue to use Singulair   -long discussion held with patient on side effects of chronic steroid use.  Will prescribe prednisone burst as needed for asthma exacerbation.  -encourage patient to continue with PPI.  -recommended patient obtains follow-up PFTs, however, he would like to discuss on next visitation.

## 2020-12-17 NOTE — PROGRESS NOTES
Subjective:       Patient ID: Nakul Britton is a 60 y.o. male.    Chief Complaint: Asthma    HPI:     Nakul Britton is a 60 y.o. male with hx of asthma, never smoker, and HLD who presents for asthma medication refills. He explains has previously followed with Dr. Holcomb, but has not followed in years-new to me. He reports asthma has been controlled for years. Within the last 1 year, he denies any hospitalizations, ER, or UC visitations for respiratory illness. He denies wheezing, SOB, or chest discomfort. Currently, he reports using Flovent  110 mcg up to 4 puffs a day, Salmeterol 2 puffs a day and singular 10 mg daily. He admits taking more puffs of his inhalers than prescribed.  Discloses very hesitant on changing his inhalers because current medication regimen works well. States  tried Dulera in past- did not help. He identifies triggers as dust and weather changes. He finds allergies are controlled. He experiences daily acid reflux symptoms of mid chest burning up to throat. States having hiatal hernia- does not follow with GI. He takes daily Tums- which he reports alleviates symptoms.     Interval hx 12/3/2020-    Mr. Britton pulmonary clinic for refill on medications. He last followed with me on 12/2019.   Patient reports asthma has been well controlled.  He denies any wheezing, shortness of breath or chest discomfort.  Patient has been prescribed Advair.  Reports sparingly uses ProAir.  Reports with daily compliance of Singulair.  Patient is requesting to have prednisone.  States has been prescribed Prednisone in past to take as needed. He is requesting that I refill his prednisone.  Denies any recent hospitalizations, ER or urgent care visits for asthma.  He denies fever or chills.     Review of Systems   Constitutional: Negative for fever, chills, weight loss and night sweats.   HENT: Negative for postnasal drip, rhinorrhea and congestion.    Respiratory: Negative for cough, sputum production,  "choking, chest tightness, wheezing, dyspnea on extertion and use of rescue inhaler.    Cardiovascular: Negative for chest pain and leg swelling.   Musculoskeletal: Negative for joint swelling.   Skin: Negative for rash.   Gastrointestinal: Positive for acid reflux. Negative for abdominal pain.   Neurological: Negative for headaches.   Psychiatric/Behavioral: Negative for sleep disturbance.       Social History     Tobacco Use    Smoking status: Never Smoker    Smokeless tobacco: Never Used   Substance Use Topics    Alcohol use: Yes     Alcohol/week: 0.0 standard drinks     Comment: 4-6 times per year, socially     Review of patient's allergies indicates:   Allergen Reactions    Olive oil Rash     Past Medical History:   Diagnosis Date    Allergy     Asthma     Hiatal hernia      Past Surgical History:   Procedure Laterality Date    CATARACT EXTRACTION       Current Outpatient Medications on File Prior to Visit   Medication Sig    fluticasone-salmeterol diskus inhaler 250-50 mcg Inhale 1 puff into the lungs 2 (two) times daily. Controller    PROAIR HFA 90 mcg/actuation inhaler INHALE 2 PUFFS BY MOUTH FOUR TIMES DAILY AS NEEDED FOR WHEEZING OR SHORTNESS OF BREATH     No current facility-administered medications on file prior to visit.      Objective:      Vitals:    12/03/20 1504   BP: 121/78   BP Location: Right arm   Patient Position: Sitting   Pulse: 77   SpO2: 97%   Weight: 67.1 kg (147 lb 14.9 oz)   Height: 5' 4" (1.626 m)     Physical Exam   Constitutional: He is oriented to person, place, and time. He appears well-developed and well-nourished. No distress.   HENT:   Head: Normocephalic.   Neck: Normal range of motion. Neck supple.   Cardiovascular: Normal rate, regular rhythm and normal heart sounds.   Pulmonary/Chest: Normal expansion, effort normal and breath sounds normal. No respiratory distress. He has no wheezes. He has no rhonchi.   Abdominal: Soft. Bowel sounds are normal.   Musculoskeletal: " Normal range of motion.         General: No edema.   Neurological: He is alert and oriented to person, place, and time. Gait normal.   Skin: Skin is warm and dry.   Psychiatric: His behavior is normal. His mood appears anxious.   Vitals reviewed.    Personal Diagnostic Review    Pulmonary function tests 2019: FEV1: 1.48  (50 % predicted), FVC:  2.82 (76 % predicted), FEV1/FVC:  52, T.75 (80 % predicted), DLCO: 19 (79 % predicted)  Post BD   FEV1: 1.54  (53 % predicted), FVC:  2.98 (80 % predicted), FEV1/FVC:  52,      Assessment:     Problem List Items Addressed This Visit        Pulmonary    Mild intermittent asthma without complication - Primary    Overview     Spirometry shows significant obstruction. Lung volume determination shows mild restriction is also present.  Overall there is moderate ventilatory impairment. Spirometry remains unimproved following bronchodilator. DLCO  is normal.         Current Assessment & Plan     Pt asthma is currently stable with no increases in SOB, COLINDRES or wheezing.  There has been no increase in use of  rescue medications.  Have reviewed medications with pt including the roles of rescue and controlling medications.  All questions answered.  Pt reports no problems with technique with inhalers.  We discussed the possibility of de-escalation of therapy if asthma control remains stable.    ACT - 22    Plan:  -continue Advair 250/51 puff twice a day.  Discussed the rinse mouth after use.  -continue to use albuterol as needed for shortness of breath or wheezing  -continue to use Singulair   -long discussion held with patient on side effects of chronic steroid use.  Will prescribe prednisone burst as needed for asthma exacerbation.  -encourage patient to continue with PPI.  -recommended patient obtains follow-up PFTs, however, he would like to discuss on next visitation.             Follow up in 3 months with PFTs prior.

## 2020-12-21 LAB — HEMOCCULT STL QL IA: NEGATIVE

## 2020-12-22 ENCOUNTER — TELEPHONE (OUTPATIENT)
Dept: INTERNAL MEDICINE | Facility: CLINIC | Age: 60
End: 2020-12-22

## 2020-12-22 NOTE — TELEPHONE ENCOUNTER
----- Message from Zaida Ornelas MD sent at 12/21/2020 11:16 PM CST -----  Message sent via patient portal.

## 2020-12-31 ENCOUNTER — TELEPHONE (OUTPATIENT)
Dept: INTERNAL MEDICINE | Facility: CLINIC | Age: 60
End: 2020-12-31

## 2020-12-31 DIAGNOSIS — J45.40 MODERATE PERSISTENT ASTHMA WITHOUT COMPLICATION: Primary | ICD-10-CM

## 2020-12-31 RX ORDER — ALBUTEROL SULFATE 90 UG/1
AEROSOL, METERED RESPIRATORY (INHALATION)
Qty: 8.5 G | Refills: 11 | Status: SHIPPED | OUTPATIENT
Start: 2020-12-31 | End: 2021-05-27

## 2020-12-31 NOTE — TELEPHONE ENCOUNTER
----- Message from Thania Muñiz sent at 12/31/2020  9:55 AM CST -----  Contact: Self   Pt is requesting status of albuterol (PROVENTIL/VENTOLIN HFA) 90 mcg/actuation inhaler [Pharmacy Med Name: ALBUTEROL HFA INH([id number]]. Please advise

## 2021-01-11 ENCOUNTER — PATIENT MESSAGE (OUTPATIENT)
Dept: INTERNAL MEDICINE | Facility: CLINIC | Age: 61
End: 2021-01-11

## 2021-01-12 DIAGNOSIS — J45.20 MILD INTERMITTENT ASTHMA WITHOUT COMPLICATION: Primary | ICD-10-CM

## 2021-01-12 RX ORDER — PREDNISONE 10 MG/1
TABLET ORAL
Qty: 5 TABLET | Refills: 0 | Status: SHIPPED | OUTPATIENT
Start: 2021-01-12 | End: 2021-01-21

## 2021-01-19 ENCOUNTER — LAB VISIT (OUTPATIENT)
Dept: INTERNAL MEDICINE | Facility: CLINIC | Age: 61
End: 2021-01-19
Payer: MEDICAID

## 2021-01-19 ENCOUNTER — PATIENT MESSAGE (OUTPATIENT)
Dept: PULMONOLOGY | Facility: CLINIC | Age: 61
End: 2021-01-19

## 2021-01-19 ENCOUNTER — PATIENT MESSAGE (OUTPATIENT)
Dept: INTERNAL MEDICINE | Facility: CLINIC | Age: 61
End: 2021-01-19

## 2021-01-19 DIAGNOSIS — R05.9 COUGH: ICD-10-CM

## 2021-01-19 PROCEDURE — U0003 INFECTIOUS AGENT DETECTION BY NUCLEIC ACID (DNA OR RNA); SEVERE ACUTE RESPIRATORY SYNDROME CORONAVIRUS 2 (SARS-COV-2) (CORONAVIRUS DISEASE [COVID-19]), AMPLIFIED PROBE TECHNIQUE, MAKING USE OF HIGH THROUGHPUT TECHNOLOGIES AS DESCRIBED BY CMS-2020-01-R: HCPCS

## 2021-01-20 LAB — SARS-COV-2 RNA RESP QL NAA+PROBE: NOT DETECTED

## 2021-01-21 ENCOUNTER — HOSPITAL ENCOUNTER (OUTPATIENT)
Dept: RADIOLOGY | Facility: HOSPITAL | Age: 61
Discharge: HOME | End: 2021-01-21
Attending: NURSE PRACTITIONER
Payer: MEDICAID

## 2021-01-21 ENCOUNTER — OFFICE VISIT (OUTPATIENT)
Dept: PULMONOLOGY | Facility: CLINIC | Age: 61
End: 2021-01-21
Payer: MEDICAID

## 2021-01-21 VITALS
BODY MASS INDEX: 24.96 KG/M2 | HEART RATE: 85 BPM | WEIGHT: 146.19 LBS | DIASTOLIC BLOOD PRESSURE: 78 MMHG | OXYGEN SATURATION: 96 % | SYSTOLIC BLOOD PRESSURE: 130 MMHG | HEIGHT: 64 IN

## 2021-01-21 DIAGNOSIS — J45.20 MILD INTERMITTENT ASTHMA WITHOUT COMPLICATION: Primary | ICD-10-CM

## 2021-01-21 DIAGNOSIS — J45.40 MODERATE PERSISTENT ASTHMA WITHOUT COMPLICATION: Primary | ICD-10-CM

## 2021-01-21 DIAGNOSIS — J45.40 MODERATE PERSISTENT ASTHMA WITHOUT COMPLICATION: ICD-10-CM

## 2021-01-21 PROCEDURE — 71046 XR CHEST PA AND LATERAL: ICD-10-PCS | Mod: 26,,, | Performed by: RADIOLOGY

## 2021-01-21 PROCEDURE — 71046 X-RAY EXAM CHEST 2 VIEWS: CPT | Mod: TC,FY

## 2021-01-21 PROCEDURE — 99213 OFFICE O/P EST LOW 20 MIN: CPT | Mod: PBBFAC,25 | Performed by: NURSE PRACTITIONER

## 2021-01-21 PROCEDURE — 99213 PR OFFICE/OUTPT VISIT, EST, LEVL III, 20-29 MIN: ICD-10-PCS | Mod: S$PBB,,, | Performed by: NURSE PRACTITIONER

## 2021-01-21 PROCEDURE — 99999 PR PBB SHADOW E&M-EST. PATIENT-LVL III: CPT | Mod: PBBFAC,,, | Performed by: NURSE PRACTITIONER

## 2021-01-21 PROCEDURE — 71046 X-RAY EXAM CHEST 2 VIEWS: CPT | Mod: 26,,, | Performed by: RADIOLOGY

## 2021-01-21 PROCEDURE — 99213 OFFICE O/P EST LOW 20 MIN: CPT | Mod: S$PBB,,, | Performed by: NURSE PRACTITIONER

## 2021-01-21 PROCEDURE — 99999 PR PBB SHADOW E&M-EST. PATIENT-LVL III: ICD-10-PCS | Mod: PBBFAC,,, | Performed by: NURSE PRACTITIONER

## 2021-01-21 RX ORDER — PREDNISONE 10 MG/1
10 TABLET ORAL DAILY
Qty: 14 TABLET | Refills: 0 | Status: SHIPPED | OUTPATIENT
Start: 2021-01-21 | End: 2021-02-04

## 2021-01-21 NOTE — TELEPHONE ENCOUNTER
"Dr. Fernando Holcomb is his pulmonary specialist, pt has COPD with asthma.  Took the patient off of Flovent due to his insurance no longer covers it, and replaced it with Advair rx.  Advair called in, and pharmacy is telling him that it requires a prior authorization. He states he estimated he will run out of Flovent by tomorrow.   S/w Sol, [facility] who confirms that the Advair needs PA, also states that he was unable to fill the Flovent because it needed a new PA, as well, not because the medication is no longer covered by his insurance.  Will page pulmonologist on call.  S/w Dr. Cassi Russo, who explained that all inhalers need prior authorization, and advised pt to use his rescue inhaler or nebulizer treatment until he  Can get in touch with the pulmonologist's office.  Advised flovent and advair are maintenance medications and a missed dose or two should not cause pt to have any worsening of symptoms, if it does, he should call back and they can discuss putting him on po prednisone.  Informed pt of the above, he is very angry, as he has been working on this since Tuesday.    Reason for Disposition   [1] Request for URGENT new prescription or refill of "essential" medication (i.e., likelihood of harm to patient if not taken) AND [2] triager unable to fill per unit policy    Protocols used: MEDICATION QUESTION CALL-A-AH      " Pt  of Dr. Bain was instructed on how to do SIC using a 18F Straight Catheter . Pt was instructed to clean hands thoroughly, if did not have gloves available. Pull back foreskin of penis and clean area with babywipe. While either sitting on toilet or standing over toilet, hold penis in one hand and insert catheter into urethra using the other hand. Once, the catheter has been advances part of the way, resistance will be met. Pt advised to gently advance the catheter in, pushing past the prostate. Pt physically demonstrated this to me, successfully into a graduated cylinder,  as I was giving him verbal instruction. Pt instructed after voiding, to throw catheter away, retract foreskin, and wash hands thoroughly. Pt was advised, per Dr. Bain instruction, to self catheterize himself as needed.  Patient advised to call the office with any questions or concerns. Pt verbalized understanding. SUSAN Levin        Medical Assistant documentation is reviewed. Agree with management.

## 2021-03-10 NOTE — TELEPHONE ENCOUNTER
Initial Anesthesia Post-op Note    Patient: Boni Morillo  Procedure(s) Performed: RIGHT THYROID LOBECTOMY, FROZEN SECTION, PROBABLE TOTAL - RIGHT  Anesthesia type: General    Vitals Value Taken Time   Temp 36.4 °C (97.6 °F) 03/10/21 1438   Pulse 90 03/10/21 1443   Resp 16 03/10/21 1443   SpO2 93 % 03/10/21 1443   /83 03/10/21 1443         Patient Location: PACU Phase 1  Post-op Vital Signs:stable  Level of Consciousness: sedated  Respiratory Status: spontaneous ventilation, face mask and oral airway  Cardiovascular blood pressure returned to baseline  Hydration: euvolemic  Pain Management: adequately controlled  Handoff: Handoff to receiving nurse was performed and questions were answered Nausea: None  Airway Patency:oral airway  Post-op Assessment: no complications and patient tolerated procedure well with no complications      No complications documented.   Message sent to scheduling call pt to set appt.

## 2021-04-01 ENCOUNTER — IMMUNIZATION (OUTPATIENT)
Dept: INTERNAL MEDICINE | Facility: CLINIC | Age: 61
End: 2021-04-01
Payer: MEDICAID

## 2021-04-01 DIAGNOSIS — Z23 NEED FOR VACCINATION: Primary | ICD-10-CM

## 2021-04-01 PROCEDURE — 91301 COVID-19, MRNA, LNP-S, PF, 100 MCG/0.5 ML DOSE VACCINE: ICD-10-PCS | Mod: ,,, | Performed by: INTERNAL MEDICINE

## 2021-04-01 PROCEDURE — 0011A COVID-19, MRNA, LNP-S, PF, 100 MCG/0.5 ML DOSE VACCINE: ICD-10-PCS | Mod: CV19,,, | Performed by: INTERNAL MEDICINE

## 2021-04-01 PROCEDURE — 0011A COVID-19, MRNA, LNP-S, PF, 100 MCG/0.5 ML DOSE VACCINE: CPT | Mod: CV19,,, | Performed by: INTERNAL MEDICINE

## 2021-04-01 PROCEDURE — 91301 COVID-19, MRNA, LNP-S, PF, 100 MCG/0.5 ML DOSE VACCINE: CPT | Mod: ,,, | Performed by: INTERNAL MEDICINE

## 2021-04-29 ENCOUNTER — IMMUNIZATION (OUTPATIENT)
Dept: INTERNAL MEDICINE | Facility: CLINIC | Age: 61
End: 2021-04-29
Payer: MEDICAID

## 2021-04-29 DIAGNOSIS — Z23 NEED FOR VACCINATION: Primary | ICD-10-CM

## 2021-04-29 PROCEDURE — 0012A COVID-19, MRNA, LNP-S, PF, 100 MCG/0.5 ML DOSE VACCINE: CPT | Mod: CV19,S$GLB,, | Performed by: INTERNAL MEDICINE

## 2021-04-29 PROCEDURE — 91301 COVID-19, MRNA, LNP-S, PF, 100 MCG/0.5 ML DOSE VACCINE: CPT | Mod: S$GLB,,, | Performed by: INTERNAL MEDICINE

## 2021-04-29 PROCEDURE — 0012A COVID-19, MRNA, LNP-S, PF, 100 MCG/0.5 ML DOSE VACCINE: ICD-10-PCS | Mod: CV19,S$GLB,, | Performed by: INTERNAL MEDICINE

## 2021-04-29 PROCEDURE — 91301 COVID-19, MRNA, LNP-S, PF, 100 MCG/0.5 ML DOSE VACCINE: ICD-10-PCS | Mod: S$GLB,,, | Performed by: INTERNAL MEDICINE

## 2021-05-25 ENCOUNTER — TELEPHONE (OUTPATIENT)
Dept: PULMONOLOGY | Facility: CLINIC | Age: 61
End: 2021-05-25

## 2021-05-26 DIAGNOSIS — J45.20 MILD INTERMITTENT ASTHMA WITHOUT COMPLICATION: ICD-10-CM

## 2021-05-26 RX ORDER — FLUTICASONE PROPIONATE AND SALMETEROL 250; 50 UG/1; UG/1
1 POWDER RESPIRATORY (INHALATION) 2 TIMES DAILY
Qty: 60 EACH | Refills: 6 | Status: SHIPPED | OUTPATIENT
Start: 2021-05-26 | End: 2021-06-18

## 2021-06-18 ENCOUNTER — TELEPHONE (OUTPATIENT)
Dept: PULMONOLOGY | Facility: CLINIC | Age: 61
End: 2021-06-18

## 2021-06-18 DIAGNOSIS — J45.909 ASTHMA, UNSPECIFIED ASTHMA SEVERITY, UNSPECIFIED WHETHER COMPLICATED, UNSPECIFIED WHETHER PERSISTENT: Primary | ICD-10-CM

## 2021-06-18 RX ORDER — PREDNISONE 20 MG/1
20 TABLET ORAL 2 TIMES DAILY
Qty: 10 TABLET | Refills: 0 | Status: SHIPPED | OUTPATIENT
Start: 2021-06-18 | End: 2021-06-23

## 2021-06-18 RX ORDER — FLUTICASONE PROPIONATE AND SALMETEROL 500; 50 UG/1; UG/1
1 POWDER RESPIRATORY (INHALATION) 2 TIMES DAILY
Qty: 60 EACH | Refills: 11 | Status: SHIPPED | OUTPATIENT
Start: 2021-06-18 | End: 2023-03-08 | Stop reason: SDUPTHER

## 2021-06-21 DIAGNOSIS — J45.40 MODERATE PERSISTENT ASTHMA WITHOUT COMPLICATION: Primary | ICD-10-CM

## 2021-06-30 ENCOUNTER — HOSPITAL ENCOUNTER (OUTPATIENT)
Dept: PULMONOLOGY | Facility: CLINIC | Age: 61
Discharge: HOME | End: 2021-06-30
Payer: MEDICAID

## 2021-06-30 ENCOUNTER — OFFICE VISIT (OUTPATIENT)
Dept: PULMONOLOGY | Facility: CLINIC | Age: 61
End: 2021-06-30
Payer: MEDICAID

## 2021-06-30 VITALS
HEART RATE: 66 BPM | SYSTOLIC BLOOD PRESSURE: 122 MMHG | WEIGHT: 156.5 LBS | HEIGHT: 64 IN | OXYGEN SATURATION: 97 % | DIASTOLIC BLOOD PRESSURE: 66 MMHG | BODY MASS INDEX: 26.72 KG/M2

## 2021-06-30 DIAGNOSIS — J44.1 ASTHMA EXACERBATION IN COPD: Primary | ICD-10-CM

## 2021-06-30 DIAGNOSIS — J45.40 MODERATE PERSISTENT ASTHMA WITHOUT COMPLICATION: ICD-10-CM

## 2021-06-30 DIAGNOSIS — J45.901 ASTHMA EXACERBATION IN COPD: Primary | ICD-10-CM

## 2021-06-30 LAB
FEF 25 75 LLN: 1.16
FEF 25 75 PRE REF: 35.6 %
FEF 25 75 REF: 2.44
FET100 CHG: 12.9 %
FEV05 LLN: 1.08
FEV05 REF: 2.21
FEV1 CHG: 2.6 %
FEV1 FVC LLN: 66
FEV1 FVC PRE REF: 75.8 %
FEV1 FVC REF: 78
FEV1 LLN: 2.14
FEV1 PRE REF: 58.7 %
FEV1 REF: 2.86
FEV1 VOL CHG: 44.41
FVC CHG: 4.8 %
FVC LLN: 2.77
FVC PRE REF: 77.5 %
FVC REF: 3.66
FVC VOL CHG: 137
PEF LLN: 5.84
PEF PRE REF: 43.8 %
PEF REF: 7.78
PHYSICIAN COMMENT: ABNORMAL
POST FEF 25 75: 0.83 L/S (ref 1.16–4.18)
POST FET 100: 7.72 SEC
POST FEV1 FVC: 58.05 % (ref 65.67–89.13)
POST FEV1: 1.72 L (ref 2.14–3.55)
POST FEV5: 1.21 L (ref 1.08–3.35)
POST FVC: 2.97 L (ref 2.77–4.56)
POST PEF: 4.59 L/S (ref 5.84–9.71)
PRE FEF 25 75: 0.87 L/S (ref 1.16–4.18)
PRE FET 100: 6.84 SEC
PRE FEV05 REF: 56.4 %
PRE FEV1 FVC: 59.29 % (ref 65.67–89.13)
PRE FEV1: 1.68 L (ref 2.14–3.55)
PRE FEV5: 1.25 L (ref 1.08–3.35)
PRE FVC: 2.83 L (ref 2.77–4.56)
PRE PEF: 3.4 L/S (ref 5.84–9.71)

## 2021-06-30 PROCEDURE — 99999 PR PBB SHADOW E&M-EST. PATIENT-LVL III: ICD-10-PCS | Mod: PBBFAC,,, | Performed by: INTERNAL MEDICINE

## 2021-06-30 PROCEDURE — 94060 PR EVAL OF BRONCHOSPASM: ICD-10-PCS | Mod: 26,S$PBB,, | Performed by: INTERNAL MEDICINE

## 2021-06-30 PROCEDURE — 99213 OFFICE O/P EST LOW 20 MIN: CPT | Mod: PBBFAC,25 | Performed by: INTERNAL MEDICINE

## 2021-06-30 PROCEDURE — 99214 OFFICE O/P EST MOD 30 MIN: CPT | Mod: S$PBB,,, | Performed by: INTERNAL MEDICINE

## 2021-06-30 PROCEDURE — 99999 PR PBB SHADOW E&M-EST. PATIENT-LVL III: CPT | Mod: PBBFAC,,, | Performed by: INTERNAL MEDICINE

## 2021-06-30 PROCEDURE — 94060 EVALUATION OF WHEEZING: CPT | Mod: 26,S$PBB,, | Performed by: INTERNAL MEDICINE

## 2021-06-30 PROCEDURE — 99214 PR OFFICE/OUTPT VISIT, EST, LEVL IV, 30-39 MIN: ICD-10-PCS | Mod: S$PBB,,, | Performed by: INTERNAL MEDICINE

## 2021-06-30 PROCEDURE — 94060 EVALUATION OF WHEEZING: CPT | Mod: PBBFAC | Performed by: INTERNAL MEDICINE

## 2021-06-30 RX ORDER — PREDNISONE 10 MG/1
TABLET ORAL
Qty: 36 TABLET | Refills: 2 | Status: SHIPPED | OUTPATIENT
Start: 2021-06-30 | End: 2022-09-07 | Stop reason: SDUPTHER

## 2021-12-03 ENCOUNTER — IMMUNIZATION (OUTPATIENT)
Dept: INTERNAL MEDICINE | Facility: CLINIC | Age: 61
End: 2021-12-03
Payer: MEDICAID

## 2021-12-03 DIAGNOSIS — Z23 NEED FOR VACCINATION: Primary | ICD-10-CM

## 2021-12-03 PROCEDURE — 0064A COVID-19, MRNA, LNP-S, PF, 100 MCG/0.25 ML DOSE VACCINE (MODERNA BOOSTER): CPT | Mod: PBBFAC,CV19

## 2021-12-23 ENCOUNTER — LAB VISIT (OUTPATIENT)
Dept: PRIMARY CARE CLINIC | Facility: OTHER | Age: 61
End: 2021-12-23
Payer: MEDICAID

## 2021-12-23 DIAGNOSIS — Z20.822 ENCOUNTER FOR LABORATORY TESTING FOR COVID-19 VIRUS: ICD-10-CM

## 2021-12-23 PROCEDURE — U0003 INFECTIOUS AGENT DETECTION BY NUCLEIC ACID (DNA OR RNA); SEVERE ACUTE RESPIRATORY SYNDROME CORONAVIRUS 2 (SARS-COV-2) (CORONAVIRUS DISEASE [COVID-19]), AMPLIFIED PROBE TECHNIQUE, MAKING USE OF HIGH THROUGHPUT TECHNOLOGIES AS DESCRIBED BY CMS-2020-01-R: HCPCS | Performed by: INTERNAL MEDICINE

## 2021-12-24 ENCOUNTER — PATIENT MESSAGE (OUTPATIENT)
Dept: ADMINISTRATIVE | Facility: OTHER | Age: 61
End: 2021-12-24
Payer: MEDICAID

## 2021-12-25 LAB
SARS-COV-2 RNA RESP QL NAA+PROBE: NOT DETECTED
SARS-COV-2- CYCLE NUMBER: NORMAL

## 2022-01-25 ENCOUNTER — PATIENT MESSAGE (OUTPATIENT)
Dept: ADMINISTRATIVE | Facility: HOSPITAL | Age: 62
End: 2022-01-25
Payer: MEDICAID

## 2022-02-15 ENCOUNTER — TELEPHONE (OUTPATIENT)
Dept: INTERNAL MEDICINE | Facility: CLINIC | Age: 62
End: 2022-02-15
Payer: MEDICAID

## 2022-02-15 DIAGNOSIS — Z00.00 ANNUAL PHYSICAL EXAM: Primary | ICD-10-CM

## 2022-02-15 NOTE — TELEPHONE ENCOUNTER
----- Message from Michelle Vega MA sent at 2/15/2022  3:32 PM CST -----  Contact: [phone]    ----- Message -----  From: Leyla Ayers  Sent: 2/15/2022   3:27 PM CST  To: [facility] Staff    No blue slot available to schedule an appointment for the patient.  Patient is established with which PCP:   Reason for the visit: annual  Would the patient like a call back, or a response through their [facility] portal?:  call

## 2022-02-15 NOTE — TELEPHONE ENCOUNTER
Last seen 12/14/20 and had labs.      Calling in February to be fit in for annual that is over a year past due.      appts set up with patient.  Orders entered and linked to lab appt.  Mailed appt slips.

## 2022-02-24 DIAGNOSIS — J45.40 MODERATE PERSISTENT ASTHMA WITHOUT COMPLICATION: ICD-10-CM

## 2022-02-24 RX ORDER — MONTELUKAST SODIUM 10 MG/1
10 TABLET ORAL NIGHTLY
Qty: 90 TABLET | Refills: 1 | Status: SHIPPED | OUTPATIENT
Start: 2022-02-24 | End: 2022-07-27

## 2022-02-24 NOTE — TELEPHONE ENCOUNTER
No new care gaps identified.  Powered by [employer] by [employer]. Reference number: [id number].   2/24/2022 8:55:51 AM CST

## 2022-03-18 ENCOUNTER — LAB VISIT (OUTPATIENT)
Dept: LAB | Facility: HOSPITAL | Age: 62
End: 2022-03-18
Attending: INTERNAL MEDICINE
Payer: MEDICAID

## 2022-03-18 DIAGNOSIS — Z00.00 ANNUAL PHYSICAL EXAM: ICD-10-CM

## 2022-03-18 LAB
ALBUMIN SERPL BCP-MCNC: 3.7 G/DL (ref 3.5–5.2)
ALP SERPL-CCNC: 65 U/L (ref 55–135)
ALT SERPL W/O P-5'-P-CCNC: 19 U/L (ref 10–44)
ANION GAP SERPL CALC-SCNC: 12 MMOL/L (ref 8–16)
AST SERPL-CCNC: 20 U/L (ref 10–40)
BASOPHILS # BLD AUTO: 0.07 K/UL (ref 0–0.2)
BASOPHILS NFR BLD: 1 % (ref 0–1.9)
BILIRUB SERPL-MCNC: 0.4 MG/DL (ref 0.1–1)
BUN SERPL-MCNC: 15 MG/DL (ref 8–23)
CALCIUM SERPL-MCNC: 9.7 MG/DL (ref 8.7–10.5)
CHLORIDE SERPL-SCNC: 104 MMOL/L (ref 95–110)
CHOLEST SERPL-MCNC: 231 MG/DL (ref 120–199)
CHOLEST/HDLC SERPL: 5.3 {RATIO} (ref 2–5)
CO2 SERPL-SCNC: 26 MMOL/L (ref 23–29)
COMPLEXED PSA SERPL-MCNC: 1.6 NG/ML (ref 0–4)
CREAT SERPL-MCNC: 0.8 MG/DL (ref 0.5–1.4)
DIFFERENTIAL METHOD: ABNORMAL
EOSINOPHIL # BLD AUTO: 0.3 K/UL (ref 0–0.5)
EOSINOPHIL NFR BLD: 3.9 % (ref 0–8)
ERYTHROCYTE [DISTWIDTH] IN BLOOD BY AUTOMATED COUNT: 16.4 % (ref 11.5–14.5)
EST. GFR  (AFRICAN AMERICAN): >60 ML/MIN/1.73 M^2
EST. GFR  (NON AFRICAN AMERICAN): >60 ML/MIN/1.73 M^2
ESTIMATED AVG GLUCOSE: 126 MG/DL (ref 68–131)
GLUCOSE SERPL-MCNC: 96 MG/DL (ref 70–110)
HBA1C MFR BLD: 6 % (ref 4–5.6)
HCT VFR BLD AUTO: 46.9 % (ref 40–54)
HDLC SERPL-MCNC: 44 MG/DL (ref 40–75)
HDLC SERPL: 19 % (ref 20–50)
HGB BLD-MCNC: 14.3 G/DL (ref 14–18)
IMM GRANULOCYTES # BLD AUTO: 0.02 K/UL (ref 0–0.04)
IMM GRANULOCYTES NFR BLD AUTO: 0.3 % (ref 0–0.5)
LDLC SERPL CALC-MCNC: 161.8 MG/DL (ref 63–159)
LYMPHOCYTES # BLD AUTO: 1.8 K/UL (ref 1–4.8)
LYMPHOCYTES NFR BLD: 26.2 % (ref 18–48)
MCH RBC QN AUTO: 28.5 PG (ref 27–31)
MCHC RBC AUTO-ENTMCNC: 30.5 G/DL (ref 32–36)
MCV RBC AUTO: 94 FL (ref 82–98)
MONOCYTES # BLD AUTO: 0.7 K/UL (ref 0.3–1)
MONOCYTES NFR BLD: 10.3 % (ref 4–15)
NEUTROPHILS # BLD AUTO: 3.9 K/UL (ref 1.8–7.7)
NEUTROPHILS NFR BLD: 58.3 % (ref 38–73)
NONHDLC SERPL-MCNC: 187 MG/DL
NRBC BLD-RTO: 0 /100 WBC
PLATELET # BLD AUTO: 268 K/UL (ref 150–450)
PMV BLD AUTO: 10.2 FL (ref 9.2–12.9)
POTASSIUM SERPL-SCNC: 4.8 MMOL/L (ref 3.5–5.1)
PROT SERPL-MCNC: 7.2 G/DL (ref 6–8.4)
RBC # BLD AUTO: 5.01 M/UL (ref 4.6–6.2)
SODIUM SERPL-SCNC: 142 MMOL/L (ref 136–145)
TRIGL SERPL-MCNC: 126 MG/DL (ref 30–150)
TSH SERPL DL<=0.005 MIU/L-ACNC: 1.5 UIU/ML (ref 0.4–4)
WBC # BLD AUTO: 6.71 K/UL (ref 3.9–12.7)

## 2022-03-18 PROCEDURE — 85025 COMPLETE CBC W/AUTO DIFF WBC: CPT | Performed by: INTERNAL MEDICINE

## 2022-03-18 PROCEDURE — 36415 COLL VENOUS BLD VENIPUNCTURE: CPT | Mod: PO | Performed by: INTERNAL MEDICINE

## 2022-03-18 PROCEDURE — 84153 ASSAY OF PSA TOTAL: CPT | Performed by: INTERNAL MEDICINE

## 2022-03-18 PROCEDURE — 80061 LIPID PANEL: CPT | Performed by: INTERNAL MEDICINE

## 2022-03-18 PROCEDURE — 80053 COMPREHEN METABOLIC PANEL: CPT | Performed by: INTERNAL MEDICINE

## 2022-03-18 PROCEDURE — 83036 HEMOGLOBIN GLYCOSYLATED A1C: CPT | Performed by: INTERNAL MEDICINE

## 2022-03-18 PROCEDURE — 84443 ASSAY THYROID STIM HORMONE: CPT | Performed by: INTERNAL MEDICINE

## 2022-03-24 ENCOUNTER — OFFICE VISIT (OUTPATIENT)
Dept: INTERNAL MEDICINE | Facility: CLINIC | Age: 62
End: 2022-03-24
Payer: MEDICAID

## 2022-03-24 VITALS
TEMPERATURE: 97 F | RESPIRATION RATE: 17 BRPM | DIASTOLIC BLOOD PRESSURE: 70 MMHG | HEART RATE: 66 BPM | HEIGHT: 64 IN | OXYGEN SATURATION: 99 % | SYSTOLIC BLOOD PRESSURE: 138 MMHG | WEIGHT: 147.5 LBS | BODY MASS INDEX: 25.18 KG/M2

## 2022-03-24 DIAGNOSIS — Z00.00 ANNUAL PHYSICAL EXAM: ICD-10-CM

## 2022-03-24 DIAGNOSIS — R42 DIZZINESS OF UNKNOWN CAUSE: ICD-10-CM

## 2022-03-24 DIAGNOSIS — J45.40 MODERATE PERSISTENT ASTHMA WITHOUT COMPLICATION: Primary | ICD-10-CM

## 2022-03-24 DIAGNOSIS — Z12.11 SCREEN FOR COLON CANCER: ICD-10-CM

## 2022-03-24 DIAGNOSIS — H04.203 WATERY EYES: ICD-10-CM

## 2022-03-24 DIAGNOSIS — R09.89 CAROTID BRUIT, UNSPECIFIED LATERALITY: ICD-10-CM

## 2022-03-24 DIAGNOSIS — R73.03 PRE-DIABETES: ICD-10-CM

## 2022-03-24 DIAGNOSIS — E78.2 MIXED HYPERLIPIDEMIA: ICD-10-CM

## 2022-03-24 DIAGNOSIS — N52.9 ERECTILE DYSFUNCTION, UNSPECIFIED ERECTILE DYSFUNCTION TYPE: ICD-10-CM

## 2022-03-24 PROCEDURE — 3008F BODY MASS INDEX DOCD: CPT | Mod: CPTII,,, | Performed by: INTERNAL MEDICINE

## 2022-03-24 PROCEDURE — 99999 PR PBB SHADOW E&M-EST. PATIENT-LVL IV: ICD-10-PCS | Mod: PBBFAC,,, | Performed by: INTERNAL MEDICINE

## 2022-03-24 PROCEDURE — 3044F PR MOST RECENT HEMOGLOBIN A1C LEVEL <7.0%: ICD-10-PCS | Mod: CPTII,,, | Performed by: INTERNAL MEDICINE

## 2022-03-24 PROCEDURE — 99214 PR OFFICE/OUTPT VISIT, EST, LEVL IV, 30-39 MIN: ICD-10-PCS | Mod: S$PBB,,, | Performed by: INTERNAL MEDICINE

## 2022-03-24 PROCEDURE — 1160F PR REVIEW ALL MEDS BY PRESCRIBER/CLIN PHARMACIST DOCUMENTED: ICD-10-PCS | Mod: CPTII,,, | Performed by: INTERNAL MEDICINE

## 2022-03-24 PROCEDURE — 3075F SYST BP GE 130 - 139MM HG: CPT | Mod: CPTII,,, | Performed by: INTERNAL MEDICINE

## 2022-03-24 PROCEDURE — 99214 OFFICE O/P EST MOD 30 MIN: CPT | Mod: S$PBB,,, | Performed by: INTERNAL MEDICINE

## 2022-03-24 PROCEDURE — 99999 PR PBB SHADOW E&M-EST. PATIENT-LVL IV: CPT | Mod: PBBFAC,,, | Performed by: INTERNAL MEDICINE

## 2022-03-24 PROCEDURE — 1160F RVW MEDS BY RX/DR IN RCRD: CPT | Mod: CPTII,,, | Performed by: INTERNAL MEDICINE

## 2022-03-24 PROCEDURE — 99214 OFFICE O/P EST MOD 30 MIN: CPT | Mod: PBBFAC,PO | Performed by: INTERNAL MEDICINE

## 2022-03-24 PROCEDURE — 3078F DIAST BP <80 MM HG: CPT | Mod: CPTII,,, | Performed by: INTERNAL MEDICINE

## 2022-03-24 PROCEDURE — 3078F PR MOST RECENT DIASTOLIC BLOOD PRESSURE < 80 MM HG: ICD-10-PCS | Mod: CPTII,,, | Performed by: INTERNAL MEDICINE

## 2022-03-24 PROCEDURE — 3044F HG A1C LEVEL LT 7.0%: CPT | Mod: CPTII,,, | Performed by: INTERNAL MEDICINE

## 2022-03-24 PROCEDURE — 3008F PR BODY MASS INDEX (BMI) DOCUMENTED: ICD-10-PCS | Mod: CPTII,,, | Performed by: INTERNAL MEDICINE

## 2022-03-24 PROCEDURE — 1159F PR MEDICATION LIST DOCUMENTED IN MEDICAL RECORD: ICD-10-PCS | Mod: CPTII,,, | Performed by: INTERNAL MEDICINE

## 2022-03-24 PROCEDURE — 3075F PR MOST RECENT SYSTOLIC BLOOD PRESS GE 130-139MM HG: ICD-10-PCS | Mod: CPTII,,, | Performed by: INTERNAL MEDICINE

## 2022-03-24 PROCEDURE — 1159F MED LIST DOCD IN RCRD: CPT | Mod: CPTII,,, | Performed by: INTERNAL MEDICINE

## 2022-03-24 RX ORDER — ATORVASTATIN CALCIUM 10 MG/1
10 TABLET, FILM COATED ORAL DAILY
Qty: 90 TABLET | Refills: 3 | Status: SHIPPED | OUTPATIENT
Start: 2022-03-24 | End: 2022-04-12

## 2022-03-24 NOTE — PROGRESS NOTES
Subjective:     Nakul Britton is a 61 y.o. male who presents for an annual exam.    PCP: Zaida Ornelas MD       Asthma: Patient presents for evaluation of dyspnea and wheezing.  Symptoms currently include dyspnea and wheezing and occur monthly. He sees Pulmonology for follow-up. No recent flare-up of symptoms.      Pre-diabetes: Patient has a family history of diabetes. Weight has been stable. He has not taken medications to reduce the risk for progression to diabetes.  He denies excessive urination, no excessive thirst and no excessive hunger.     Hemoglobin A1C   Date Value Ref Range Status   03/18/2022 6.0 (H) 4.0 - 5.6 % Final   12/09/2020 5.8 (H) 4.0 - 5.6 % Final   06/05/2019 5.8 (H) 4.0 - 5.6 % Final       Hyperlipidemia: Lipid panel shows that total cholesterol and LDL cholesterol levels are high. ASCVD 15%. He has not considered treatment. The patient exercises rarely and intermittently. Previous history of cardiac disease includes: none.    Lab Results   Component Value Date    CHOL 231 (H) 03/18/2022    HDL 44 03/18/2022        Medical History:   Past Medical History:   Diagnosis Date    Allergy     Asthma     Hiatal hernia      Family History: family history includes No Known Problems in his father and mother.    Surgical History:   Past Surgical History:   Procedure Laterality Date    CATARACT EXTRACTION        Social History:  reports that he has never smoked. He has never used smokeless tobacco. He reports current alcohol use. He reports that he does not use drugs.     Allergies:   Review of patient's allergies indicates:   Allergen Reactions    Center-al house dust Shortness Of Breath    House dust mite Shortness Of Breath    Olive oil Rash     Medications:   Current Outpatient Medications:     albuterol (PROVENTIL/VENTOLIN HFA) 90 mcg/actuation inhaler, INHALE 2 PUFFS BY MOUTH FOUR TIMES DAILY AS NEEDED, Disp: 18 g, Rfl: 2    fluticasone-salmeterol diskus inhaler 500-50 mcg,  Inhale 1 puff into the lungs 2 (two) times daily. Controller, Disp: 60 each, Rfl: 11    montelukast (SINGULAIR) 10 mg tablet, Take 1 tablet (10 mg total) by mouth every evening., Disp: 90 tablet, Rfl: 1    predniSONE (DELTASONE) 10 MG tablet, 3 tabs X 7 days then 2 tabs X 7 days/stop, Disp: 36 tablet, Rfl: 2    sildenafiL (VIAGRA) 100 MG tablet, TAKE ONE DAILY AS NEEDED FOR ERECTILE DYSFUNCTION, Disp: 30 tablet, Rfl: 5    atorvastatin (LIPITOR) 10 MG tablet, Take 1 tablet (10 mg total) by mouth once daily., Disp: 90 tablet, Rfl: 3    Health Maintenance:   Health Maintenance Topics with due status: Not Due       Topic Last Completion Date    TETANUS VACCINE 2015    Pneumococcal Vaccines (Age 0-64) 2017    Lipid Panel 2022        Eye Exam: due   Dental Exam: due  · Cologuard: due   Hepatitis C screenin2017, neg     Vaccinations:   Immunization History   Administered Date(s) Administered    COVID-19 MRNA, LN-S PF (MODERNA HALF 0.25 ML DOSE) 2021    COVID-19, MRNA, LN-S, PF (MODERNA FULL 0.5 ML DOSE) 2021, 2021    Hepatitis B (recombinant) Adjuvanted, 2 dose 2018    Hepatitis B, Adult 2017, 2018    Influenza 2020    Influenza - Quadrivalent 2019    Influenza - Quadrivalent - MDCK - PF 2020    Influenza - Quadrivalent - PF *Preferred* (6 months and older) 08/10/2017, 10/19/2018, 10/04/2021    Influenza - Trivalent (ADULT) 2010    Influenza - Trivalent - PF (ADULT) 2011, 2012, 08/15/2013, 2015, 2016    Influenza A (H1N1) 2009 Monovalent - IM 2009    Pneumococcal Polysaccharide - 23 Valent 2017    Td (ADULT) 2005    Tdap 2015      Influenza: Oct 2021   Tetanus:    Shingrix: due   Covid vaccine: done      Body mass index is 25.32 kg/m².  Wt Readings from Last 3 Encounters:   22 66.9 kg (147 lb 7.8 oz)   21 71 kg (156 lb 8.4 oz)   21 66.3 kg (146 lb  2.6 oz)       Review of Systems   Constitutional: Negative for chills, diaphoresis, fatigue and fever.   HENT: Negative for congestion, dental problem, ear discharge, ear pain, postnasal drip, rhinorrhea, sinus pressure, sore throat and trouble swallowing.    Eyes: Negative for redness and visual disturbance.   Respiratory: Positive for shortness of breath (occasionally) and wheezing. Negative for cough and chest tightness.    Cardiovascular: Negative for chest pain, palpitations and leg swelling.   Gastrointestinal: Negative for abdominal pain, blood in stool, constipation, diarrhea, nausea and vomiting.   Endocrine: Negative for polydipsia and polyuria.   Genitourinary: Negative for decreased urine volume, dysuria, frequency and hematuria.   Musculoskeletal: Negative for arthralgias, back pain and myalgias.   Skin: Negative for rash and wound.   Neurological: Positive for dizziness. Negative for weakness, numbness and headaches.   Hematological: Negative for adenopathy.   Psychiatric/Behavioral: Negative for dysphoric mood and sleep disturbance. The patient is not nervous/anxious.           Objective:     Physical Exam  Vitals reviewed.   Constitutional:       General: He is awake. He is not in acute distress.     Appearance: Normal appearance. He is well-developed and well-groomed. He is not ill-appearing.   HENT:      Head: Normocephalic and atraumatic.      Right Ear: Hearing, tympanic membrane, ear canal and external ear normal. Tympanic membrane is not erythematous or bulging.      Left Ear: Hearing, tympanic membrane, ear canal and external ear normal. Tympanic membrane is not erythematous or bulging.      Nose: Nose normal. No congestion.      Mouth/Throat:      Mouth: Mucous membranes are moist.      Tongue: No lesions.      Pharynx: Oropharynx is clear. Uvula midline. No oropharyngeal exudate or posterior oropharyngeal erythema.   Eyes:      General: Lids are normal. Vision grossly intact. Gaze aligned  appropriately. No scleral icterus.     Conjunctiva/sclera: Conjunctivae normal.      Right eye: Right conjunctiva is not injected.      Left eye: Left conjunctiva is not injected.      Pupils: Pupils are equal, round, and reactive to light.   Neck:      Thyroid: No thyroid mass or thyromegaly.      Vascular: Carotid bruit (faint) present.   Cardiovascular:      Rate and Rhythm: Normal rate and regular rhythm.      Pulses: Normal pulses.      Heart sounds: Normal heart sounds. No murmur heard.  Pulmonary:      Effort: Pulmonary effort is normal. No respiratory distress.      Breath sounds: Normal breath sounds. No decreased breath sounds or wheezing.   Chest:   Breasts:      Right: No supraclavicular adenopathy.      Left: No supraclavicular adenopathy.       Abdominal:      General: Bowel sounds are normal. There is no distension.      Palpations: Abdomen is soft.      Tenderness: There is no abdominal tenderness. There is no guarding or rebound.   Musculoskeletal:         General: Normal range of motion.      Cervical back: Normal range of motion and neck supple.      Right lower leg: No edema.      Left lower leg: No edema.   Lymphadenopathy:      Cervical: No cervical adenopathy.      Upper Body:      Right upper body: No supraclavicular adenopathy.      Left upper body: No supraclavicular adenopathy.   Skin:     General: Skin is warm and dry.      Coloration: Skin is not cyanotic.      Findings: No lesion or rash.      Nails: There is no clubbing.   Neurological:      General: No focal deficit present.      Mental Status: He is alert and oriented to person, place, and time.      Coordination: Coordination is intact.      Gait: Gait is intact.      Deep Tendon Reflexes: Reflexes are normal and symmetric. Reflexes normal.   Psychiatric:         Attention and Perception: Attention normal.         Mood and Affect: Mood normal.         Behavior: Behavior is cooperative.            Assessment:        1. Moderate  persistent asthma without complication    2. Pre-diabetes    3. Mixed hyperlipidemia    4. Watery eyes    5. Dizziness of unknown cause    6. Carotid bruit, unspecified laterality    7. Annual physical exam    8. Screen for colon cancer    9. Erectile dysfunction, unspecified erectile dysfunction type           Plan:     1. Moderate persistent asthma without complication  - stable, continue Advair and PRN albuterol  - f/u with Pulmonology as needed    2. Pre-diabetes  - discussed a few dietary restrictions to help avoid diabetes    3. Mixed hyperlipidemia  - discussed recommendation for treatment, stat Lipitor  - atorvastatin (LIPITOR) 10 MG tablet; Take 1 tablet (10 mg total) by mouth once daily.  Dispense: 90 tablet; Refill: 3  - Lipid Panel; Future  - Comprehensive Metabolic Panel; Future    4. Watery eyes  - could be allergic, try Pataday drops OTC    5. Dizziness of unknown cause  - advised patient to consider any associated findings including eating and drinking habits before episodes    6. Carotid bruit, unspecified laterality  - CV Ultrasound Bilateral Doppler Carotid; Future    7. Annual physical exam  - reviewed recent labs with patient  - advised to request Shingrix vaccine at [facility]    8. Screen for colon cancer  - Cologuard Screening (Multitarget Stool DNA); Future    9. Erectile Dysfunction  -continue sildenafil PRN    RTC in 6 months for follow-up/ HLD or sooner if needed    __________________________    Zaida Ornelas MD, PharmD  [facility]  American Board of Obesity Medicine diplomate  Office [phone]

## 2022-03-25 ENCOUNTER — TELEPHONE (OUTPATIENT)
Dept: INTERNAL MEDICINE | Facility: CLINIC | Age: 62
End: 2022-03-25
Payer: MEDICAID

## 2022-03-25 ENCOUNTER — PATIENT MESSAGE (OUTPATIENT)
Dept: ADMINISTRATIVE | Facility: HOSPITAL | Age: 62
End: 2022-03-25
Payer: MEDICAID

## 2022-03-25 DIAGNOSIS — Z12.11 SCREENING FOR COLON CANCER: ICD-10-CM

## 2022-04-01 ENCOUNTER — HOSPITAL ENCOUNTER (OUTPATIENT)
Dept: CARDIOLOGY | Facility: HOSPITAL | Age: 62
Discharge: HOME | End: 2022-04-01
Attending: INTERNAL MEDICINE
Payer: MEDICAID

## 2022-04-01 DIAGNOSIS — R09.89 CAROTID BRUIT, UNSPECIFIED LATERALITY: ICD-10-CM

## 2022-04-01 LAB
LEFT ARM DIASTOLIC BLOOD PRESSURE: 80 MMHG
LEFT ARM SYSTOLIC BLOOD PRESSURE: 180 MMHG
LEFT CBA DIAS: 13 CM/S
LEFT CBA SYS: 69 CM/S
LEFT CCA DIST DIAS: 14 CM/S
LEFT CCA DIST SYS: 66 CM/S
LEFT CCA MID DIAS: 16 CM/S
LEFT CCA MID SYS: 70 CM/S
LEFT CCA PROX DIAS: 12 CM/S
LEFT CCA PROX SYS: 81 CM/S
LEFT ECA DIAS: 8 CM/S
LEFT ECA SYS: 100 CM/S
LEFT ICA DIST DIAS: 29 CM/S
LEFT ICA DIST SYS: 113 CM/S
LEFT ICA MID DIAS: 44 CM/S
LEFT ICA MID SYS: 204 CM/S
LEFT ICA PROX DIAS: 69 CM/S
LEFT ICA PROX SYS: 274 CM/S
LEFT VERTEBRAL DIAS: 6 CM/S
LEFT VERTEBRAL SYS: 33 CM/S
OHS CV CAROTID RIGHT ICA EDV HIGHEST: 23
OHS CV CAROTID ULTRASOUND LEFT ICA/CCA RATIO: 4.15
OHS CV CAROTID ULTRASOUND RIGHT ICA/CCA RATIO: 1.82
OHS CV PV CAROTID LEFT HIGHEST CCA: 81
OHS CV PV CAROTID LEFT HIGHEST ICA: 274
OHS CV PV CAROTID RIGHT HIGHEST CCA: 77
OHS CV PV CAROTID RIGHT HIGHEST ICA: 100
OHS CV US CAROTID LEFT HIGHEST EDV: 69
RIGHT ARM DIASTOLIC BLOOD PRESSURE: 80 MMHG
RIGHT ARM SYSTOLIC BLOOD PRESSURE: 180 MMHG
RIGHT CBA DIAS: 12 CM/S
RIGHT CBA SYS: 59 CM/S
RIGHT CCA DIST DIAS: 8 CM/S
RIGHT CCA DIST SYS: 55 CM/S
RIGHT CCA MID DIAS: 7 CM/S
RIGHT CCA MID SYS: 77 CM/S
RIGHT CCA PROX DIAS: 8 CM/S
RIGHT CCA PROX SYS: 60 CM/S
RIGHT ECA DIAS: 5 CM/S
RIGHT ECA SYS: 60 CM/S
RIGHT ICA DIST DIAS: 23 CM/S
RIGHT ICA DIST SYS: 100 CM/S
RIGHT ICA MID DIAS: 20 CM/S
RIGHT ICA MID SYS: 85 CM/S
RIGHT ICA PROX DIAS: 17 CM/S
RIGHT ICA PROX SYS: 71 CM/S
RIGHT VERTEBRAL DIAS: 5 CM/S
RIGHT VERTEBRAL SYS: 39 CM/S

## 2022-04-01 PROCEDURE — 93880 EXTRACRANIAL BILAT STUDY: CPT | Mod: 26,,, | Performed by: INTERNAL MEDICINE

## 2022-04-01 PROCEDURE — 93880 CV US DOPPLER CAROTID (CUPID ONLY): ICD-10-PCS | Mod: 26,,, | Performed by: INTERNAL MEDICINE

## 2022-04-01 PROCEDURE — 93880 EXTRACRANIAL BILAT STUDY: CPT

## 2022-04-11 ENCOUNTER — TELEPHONE (OUTPATIENT)
Dept: INTERNAL MEDICINE | Facility: CLINIC | Age: 62
End: 2022-04-11

## 2022-04-11 DIAGNOSIS — I65.23 CAROTID ATHEROSCLEROSIS, BILATERAL: ICD-10-CM

## 2022-04-11 DIAGNOSIS — I65.22 CAROTID STENOSIS, LEFT: Primary | ICD-10-CM

## 2022-04-11 NOTE — TELEPHONE ENCOUNTER
----- Message from Minerva Mccarthy sent at 4/9/2022 10:44 AM CDT -----  Name Of Caller: aNkul     Provider Name: SHINE CASTANEDA    Does patient feel the need to be seen today? No     Relationship to the Pt?: pt    Contact Preference?: [phone]    What is the nature of the call?:Pt called and would like a call back to explain result on test CV US CAROTID [

## 2022-04-12 ENCOUNTER — TELEPHONE (OUTPATIENT)
Dept: INTERNAL MEDICINE | Facility: CLINIC | Age: 62
End: 2022-04-12

## 2022-04-12 RX ORDER — ATORVASTATIN CALCIUM 40 MG/1
40 TABLET, FILM COATED ORAL DAILY
Qty: 90 TABLET | Refills: 3 | Status: SHIPPED | OUTPATIENT
Start: 2022-04-12 | End: 2022-06-14

## 2022-04-12 RX ORDER — ASPIRIN 81 MG/1
81 TABLET ORAL DAILY
Qty: 90 TABLET | Refills: 3 | Status: SHIPPED | OUTPATIENT
Start: 2022-04-12

## 2022-04-12 NOTE — TELEPHONE ENCOUNTER
See result note for explanation.    Increase Lipitor to 40mg daily. Start aspirin 81mg daily    Vascular Surgery referral.

## 2022-04-12 NOTE — TELEPHONE ENCOUNTER
Pt notified and verbalized understanding of your comments.    States he talked to some people and they recommended Red Yeast Rice and Co-Q-10    He said he took Red Yeast Rice for a week and didn't like the way it made him feel.  Was going to just take Co-Q-10.    Explained Co-Q-10 alone, will not lower his cholesterol.  Pt agreed to 40mg Lipitor.    Message sent to DICKSON.

## 2022-04-26 LAB — NONINV COLON CA DNA+OCC BLD SCRN STL QL: NEGATIVE

## 2022-05-02 ENCOUNTER — TELEPHONE (OUTPATIENT)
Dept: INTERNAL MEDICINE | Facility: CLINIC | Age: 62
End: 2022-05-02

## 2022-05-02 NOTE — TELEPHONE ENCOUNTER
----- Message from Zaida Ornelas MD sent at 4/29/2022  7:07 PM CDT -----  The Cologuard test to screen for colon cancer is negative.

## 2022-05-17 ENCOUNTER — INITIAL CONSULT (OUTPATIENT)
Dept: VASCULAR SURGERY | Facility: CLINIC | Age: 62
End: 2022-05-17
Payer: MEDICAID

## 2022-05-17 ENCOUNTER — TELEPHONE (OUTPATIENT)
Dept: CARDIOLOGY | Facility: CLINIC | Age: 62
End: 2022-05-17
Payer: MEDICAID

## 2022-05-17 VITALS
WEIGHT: 144.19 LBS | HEART RATE: 66 BPM | RESPIRATION RATE: 20 BRPM | HEIGHT: 64 IN | DIASTOLIC BLOOD PRESSURE: 73 MMHG | SYSTOLIC BLOOD PRESSURE: 146 MMHG | OXYGEN SATURATION: 96 % | BODY MASS INDEX: 24.62 KG/M2 | TEMPERATURE: 98 F

## 2022-05-17 DIAGNOSIS — E78.00 HYPERCHOLESTEREMIA: Primary | ICD-10-CM

## 2022-05-17 DIAGNOSIS — I65.22 STENOSIS OF LEFT CAROTID ARTERY: ICD-10-CM

## 2022-05-17 DIAGNOSIS — I65.22 CAROTID STENOSIS, LEFT: ICD-10-CM

## 2022-05-17 PROCEDURE — 99205 OFFICE O/P NEW HI 60 MIN: CPT | Mod: S$PBB,,, | Performed by: SURGERY

## 2022-05-17 PROCEDURE — 99214 OFFICE O/P EST MOD 30 MIN: CPT | Mod: PBBFAC | Performed by: SURGERY

## 2022-05-17 PROCEDURE — 99205 PR OFFICE/OUTPT VISIT, NEW, LEVL V, 60-74 MIN: ICD-10-PCS | Mod: S$PBB,,, | Performed by: SURGERY

## 2022-05-17 PROCEDURE — 99999 PR PBB SHADOW E&M-EST. PATIENT-LVL IV: CPT | Mod: PBBFAC,,, | Performed by: SURGERY

## 2022-05-17 PROCEDURE — 99999 PR PBB SHADOW E&M-EST. PATIENT-LVL IV: ICD-10-PCS | Mod: PBBFAC,,, | Performed by: SURGERY

## 2022-05-17 NOTE — TELEPHONE ENCOUNTER
----- Message from Estephania Segal RN sent at 5/17/2022  4:29 PM CDT -----  Regarding: FW: New cardiology appointment  Pt scheduled for 6/14 and agreed to date/time of appointment(s).  Take care,    ----- Message -----  From: Valerie Guidry RN  Sent: 5/17/2022   3:54 PM CDT  To: Estephania Segal RN  Subject: RE: New cardiology appointment                   That would be fine I think. Can you please give him a call to discuss?    Valerie  ----- Message -----  From: Estephania Segal RN  Sent: 5/17/2022   2:58 PM CDT  To: Valerie Guidry RN, Estephania Segal, RN  Subject: New cardiology appointment                       Hi,   I cannot scheduled a Medicaid patient as a new patient in our department unless he meets some criteria (which he does not). I can however schedule him with a new provider in [facility] or [place].      Estephania,  General Cardiology Triage RN

## 2022-05-17 NOTE — PROGRESS NOTES
VASCULAR SURGERY SERVICE    REFERRING DOCTOR: SHELLY Ornelas MD    CHIEF COMPLAINT:  Carotid stenosis    HISTORY OF PRESENT ILLNESS: Nakul Britton is a 61 y.o. male no prior history of stroke TIA or amaurosis, no history of coronary artery disease, with a recently discovered 70-80% left carotid stenosis prompted by finding of carotid bruit.  Prior to this he was on no statin, was recently prescribed Lipitor 40 mg daily.  Said he did not tolerate this dose but after cutting and half was able to take it.  He is not taking any aspirin currently, although also prescribed to him recently    He endorses a 40 year history of diffuse right-sided numbness she says has largely resolved since he began starting statin therapy.\    Reports that he can walk 5 stairs without difficulty.  He does not exercise on a regular basis but says it occasionally goes for job without any trouble.  He does have asthma and is on 2 inhalers.  States that he needs steroid tapers once or twice a year with asthma flares    He has no history of neck irradiation    Past Medical History:   Diagnosis Date    Allergy     Asthma     Hiatal hernia        Past Surgical History:   Procedure Laterality Date    CATARACT EXTRACTION           Current Outpatient Medications:     atorvastatin (LIPITOR) 40 MG tablet, Take 1 tablet (40 mg total) by mouth once daily., Disp: 90 tablet, Rfl: 3    montelukast (SINGULAIR) 10 mg tablet, Take 1 tablet (10 mg total) by mouth every evening., Disp: 90 tablet, Rfl: 1    predniSONE (DELTASONE) 10 MG tablet, 3 tabs X 7 days then 2 tabs X 7 days/stop, Disp: 36 tablet, Rfl: 2    sildenafiL (VIAGRA) 100 MG tablet, TAKE ONE DAILY AS NEEDED FOR ERECTILE DYSFUNCTION, Disp: 30 tablet, Rfl: 5    albuterol (PROVENTIL/VENTOLIN HFA) 90 mcg/actuation inhaler, INHALE 2 PUFFS BY MOUTH FOUR TIMES DAILY AS NEEDED, Disp: 18 g, Rfl: 2    aspirin (ECOTRIN) 81 MG EC tablet, Take 1 tablet (81 mg total) by mouth once daily. (Patient  "not taking: Reported on 5/17/2022), Disp: 90 tablet, Rfl: 3    fluticasone-salmeterol diskus inhaler 500-50 mcg, Inhale 1 puff into the lungs 2 (two) times daily. Controller, Disp: 60 each, Rfl: 11    Review of patient's allergies indicates:   Allergen Reactions    Center-al house dust Shortness Of Breath    House dust mite Shortness Of Breath    [place] oil Rash       Family History   Problem Relation Age of Onset    No Known Problems Mother     No Known Problems Father        Social History     Tobacco Use    Smoking status: Never Smoker    Smokeless tobacco: Never Used   Substance Use Topics    Alcohol use: Yes     Alcohol/week: 0.0 standard drinks     Comment: 4-6 times per year, socially    Drug use: No       REVIEW OF SYSTEMS:  General: No chills, fever, malaise, changes in weight  HEENT: No visual changes, difficulty hearing  Cardiovascular: No chest pain, palpitations, claudication  Pulmonary: No dyspnea, cough, wheezing  Gastrointestinal: No nausea, vomiting, diarrhea, constipation  Genitourinary: No dysuria, low urine output, hematuria  Endocrine: No polydipsia, polyphagia  Hematologic: No fatigue with exertion, pica, pallor  Musculoskeletal: No extremity or joint pain, no back pain, no difficulty with ambulation  Neurologic: no seizures, no headaches, no weakness  Psychiatric: no mood disturbance      PHYSICAL EXAM:   BP (!) 146/73 (BP Location: Left arm, Patient Position: Sitting)   Pulse 66   Temp 98.2 °F (36.8 °C) (Oral)   Resp 20   Ht 5' 4" (1.626 m)   Wt 65.4 kg (144 lb 2.9 oz)   SpO2 96%   BMI 24.75 kg/m²   Constitutional:  Alert,   Well-appearing  In no distress.   Neurological: Normal speech  no focal findings  CN II - XII grossly intact.    Psychiatric: Mood and affect appropriate and symmetric.   HEENT: Normocephalic / atraumatic  PERRLA  Midline trachea  No scars across the neck   Cardiac: Regular rate and rhythm.   Pulmonary: Normal pulmonary effort.   Abdomen: Soft, not " distended.     Skin: Warm and well perfused.    Vascular:  Radial pulses are 2+   Extremities/  Musculoskeletal: No edema.        IMAGING:  Carotid duplex (cardiology) shows no significant right carotid stenosis, and a 70-80% left carotid stenosis with a peak velocity of 274, end-diastolic velocity of 69, ratio of 4.15    IMPRESSION:  70 - 80% left carotid stenosis, neurologically asymptomatic.    I had almost an hour conversation with the patient and wife regarding treatment alternatives.    He would be reasonably good candidate for the crest 2 trial, and NIH sponsored randomized prospective trial evaluating aggressive medical therapy, versus aggressive medical therapy + CEA.    Outside of the the trial, it would be reasonable to consider definitive therapy with carotid endarterectomy.  I also discussed with him TCAR and tf MAXIMO, the believe that his safest treatment would be generally with carotid endarterectomy if he desired definitive therapy.   Finally, a trial of aggressive medical therapy only outside the trial is not unreasonable.  He does wife wishes to think about these alternatives    He would also all like assistance with finding of right statin that he can tolerate.  His LDL is over 160, height on this should be dropped to at least below 100, ideally below 70    PLAN:  1.  Begin aspirin 81 mg daily now  2.  Referral to Dr. Hoffman for management of his hypercholesterolemia  3.  Follow-up with me in 3 weeks, to decide on entrance into the crest 2 trial, versus CVA versus medical therapy outside of the trial.    GROVER Cui III, MD, FACS  Professor and Chief, Vascular and Endovascular Surgery

## 2022-06-07 ENCOUNTER — OFFICE VISIT (OUTPATIENT)
Dept: VASCULAR SURGERY | Facility: CLINIC | Age: 62
End: 2022-06-07
Payer: MEDICAID

## 2022-06-07 VITALS
SYSTOLIC BLOOD PRESSURE: 139 MMHG | DIASTOLIC BLOOD PRESSURE: 62 MMHG | HEART RATE: 76 BPM | HEIGHT: 64 IN | BODY MASS INDEX: 24.1 KG/M2 | WEIGHT: 141.13 LBS | TEMPERATURE: 98 F

## 2022-06-07 DIAGNOSIS — I65.22 STENOSIS OF LEFT CAROTID ARTERY: Primary | ICD-10-CM

## 2022-06-07 PROCEDURE — 99213 OFFICE O/P EST LOW 20 MIN: CPT | Mod: S$PBB,,, | Performed by: SURGERY

## 2022-06-07 PROCEDURE — 3044F PR MOST RECENT HEMOGLOBIN A1C LEVEL <7.0%: ICD-10-PCS | Mod: CPTII,,, | Performed by: SURGERY

## 2022-06-07 PROCEDURE — 3075F SYST BP GE 130 - 139MM HG: CPT | Mod: CPTII,,, | Performed by: SURGERY

## 2022-06-07 PROCEDURE — 3075F PR MOST RECENT SYSTOLIC BLOOD PRESS GE 130-139MM HG: ICD-10-PCS | Mod: CPTII,,, | Performed by: SURGERY

## 2022-06-07 PROCEDURE — 3008F BODY MASS INDEX DOCD: CPT | Mod: CPTII,,, | Performed by: SURGERY

## 2022-06-07 PROCEDURE — 3008F PR BODY MASS INDEX (BMI) DOCUMENTED: ICD-10-PCS | Mod: CPTII,,, | Performed by: SURGERY

## 2022-06-07 PROCEDURE — 1160F PR REVIEW ALL MEDS BY PRESCRIBER/CLIN PHARMACIST DOCUMENTED: ICD-10-PCS | Mod: CPTII,,, | Performed by: SURGERY

## 2022-06-07 PROCEDURE — 3078F DIAST BP <80 MM HG: CPT | Mod: CPTII,,, | Performed by: SURGERY

## 2022-06-07 PROCEDURE — 1159F MED LIST DOCD IN RCRD: CPT | Mod: CPTII,,, | Performed by: SURGERY

## 2022-06-07 PROCEDURE — 3044F HG A1C LEVEL LT 7.0%: CPT | Mod: CPTII,,, | Performed by: SURGERY

## 2022-06-07 PROCEDURE — 1159F PR MEDICATION LIST DOCUMENTED IN MEDICAL RECORD: ICD-10-PCS | Mod: CPTII,,, | Performed by: SURGERY

## 2022-06-07 PROCEDURE — 99213 PR OFFICE/OUTPT VISIT, EST, LEVL III, 20-29 MIN: ICD-10-PCS | Mod: S$PBB,,, | Performed by: SURGERY

## 2022-06-07 PROCEDURE — 99213 OFFICE O/P EST LOW 20 MIN: CPT | Mod: PBBFAC | Performed by: SURGERY

## 2022-06-07 PROCEDURE — 99999 PR PBB SHADOW E&M-EST. PATIENT-LVL III: CPT | Mod: PBBFAC,,, | Performed by: SURGERY

## 2022-06-07 PROCEDURE — 99999 PR PBB SHADOW E&M-EST. PATIENT-LVL III: ICD-10-PCS | Mod: PBBFAC,,, | Performed by: SURGERY

## 2022-06-07 PROCEDURE — 1160F RVW MEDS BY RX/DR IN RCRD: CPT | Mod: CPTII,,, | Performed by: SURGERY

## 2022-06-07 PROCEDURE — 3078F PR MOST RECENT DIASTOLIC BLOOD PRESSURE < 80 MM HG: ICD-10-PCS | Mod: CPTII,,, | Performed by: SURGERY

## 2022-06-07 NOTE — PROGRESS NOTES
VASCULAR SURGERY SERVICE    REFERRING DOCTOR: SHELLY Ornelas MD    CHIEF COMPLAINT:  Carotid stenosis    HISTORY OF PRESENT ILLNESS: Nakul Britton is a 61 y.o. male no prior history of stroke TIA or amaurosis, no history of coronary artery disease, with a recently discovered 70-80% left carotid stenosis prompted by finding of carotid bruit.  Prior to this he was on no statin, was recently prescribed Lipitor 40 mg daily.  Said he did not tolerate this dose but after cutting and half was able to take it.  He is not taking any aspirin currently, although also prescribed to him recently    He endorses a 40 year history of diffuse right-sided numbness she says has largely resolved since he began starting statin therapy.\    Reports that he can walk 5 stairs without difficulty.  He does not exercise on a regular basis but says it occasionally goes for job without any trouble.  He does have asthma and is on 2 inhalers.  States that he needs steroid tapers once or twice a year with asthma flares    He has no history of neck irradiation    6/7/2022:  This is a 3 week follow-up.  Continues to be neurologically asymptomatic.  As per my prior note I spent nearly an hour discussing treatment alternatives regarding his asymptomatic carotid disease.  He wishes to proceed medical therapy    Past Medical History:   Diagnosis Date    Allergy     Asthma     Hiatal hernia        Past Surgical History:   Procedure Laterality Date    CATARACT EXTRACTION           Current Outpatient Medications:     albuterol (PROVENTIL/VENTOLIN HFA) 90 mcg/actuation inhaler, INHALE 2 PUFFS BY MOUTH FOUR TIMES DAILY AS NEEDED, Disp: 18 g, Rfl: 2    aspirin (ECOTRIN) 81 MG EC tablet, Take 1 tablet (81 mg total) by mouth once daily., Disp: 90 tablet, Rfl: 3    atorvastatin (LIPITOR) 40 MG tablet, Take 1 tablet (40 mg total) by mouth once daily., Disp: 90 tablet, Rfl: 3    fluticasone-salmeterol diskus inhaler 500-50 mcg, Inhale 1 puff into the  "lungs 2 (two) times daily. Controller, Disp: 60 each, Rfl: 11    montelukast (SINGULAIR) 10 mg tablet, Take 1 tablet (10 mg total) by mouth every evening., Disp: 90 tablet, Rfl: 1    predniSONE (DELTASONE) 10 MG tablet, 3 tabs X 7 days then 2 tabs X 7 days/stop, Disp: 36 tablet, Rfl: 2    sildenafiL (VIAGRA) 100 MG tablet, TAKE ONE DAILY AS NEEDED FOR ERECTILE DYSFUNCTION, Disp: 30 tablet, Rfl: 5    Review of patient's allergies indicates:   Allergen Reactions    Center-al house dust Shortness Of Breath    House dust mite Shortness Of Breath    [place] oil Rash       Family History   Problem Relation Age of Onset    No Known Problems Mother     No Known Problems Father        Social History     Tobacco Use    Smoking status: Never Smoker    Smokeless tobacco: Never Used   Substance Use Topics    Alcohol use: Yes     Alcohol/week: 0.0 standard drinks     Comment: 4-6 times per year, socially    Drug use: No     PHYSICAL EXAM:   /62 (BP Location: Right arm, Patient Position: Sitting, BP Method: Large (Automatic))   Pulse 76   Temp 98.4 °F (36.9 °C) (Oral)   Ht 5' 4" (1.626 m)   Wt 64 kg (141 lb 1.5 oz)   BMI 24.22 kg/m²   Constitutional:  Alert,   Well-appearing  In no distress.   Neurological: Normal speech  no focal findings  CN II - XII grossly intact.    Psychiatric: Mood and affect appropriate and symmetric.   HEENT: Normocephalic / atraumatic  PERRLA  Midline trachea  No scars across the neck   Cardiac: Regular rate and rhythm.   Pulmonary: Normal pulmonary effort.   Abdomen: Soft, not distended.     Skin: Warm and well perfused.    Vascular:  Radial pulses are 2+   Extremities/  Musculoskeletal: No edema.        IMAGIN/22 Carotid duplex (cardiology) shows no significant right carotid stenosis, and a 70-80% left carotid stenosis with a peak velocity of 274, end-diastolic velocity of 69, ratio of 4.15    IMPRESSION:  70 - 80% left carotid stenosis, neurologically asymptomatic.  He wishes " to pursue medical therapy    5/17/2022: I had almost an hour conversation with the patient and wife regarding treatment alternatives.    He would be reasonably good candidate for the crest 2 trial, and NIH sponsored randomized prospective trial evaluating aggressive medical therapy, versus aggressive medical therapy + CEA.    Outside of the the trial, it would be reasonable to consider definitive therapy with carotid endarterectomy.  I also discussed with him TCAR and tf MAXIMO, the believe that his safest treatment would be generally with carotid endarterectomy if he desired definitive therapy.   Finally, a trial of aggressive medical therapy only outside the trial is not unreasonable.  He does wife wishes to think about these alternatives    He would also all like assistance with finding of right statin that he can tolerate.  His LDL is over 160, height on this should be dropped to at least below 100, ideally below 70    PLAN:  1.  Continue aspirin 81 mg daily now  2.  Keep appointment with cardiologist in [place] regarding ideal statin management  3.  Follow-up with me in 6 months with carotid duplex sooner if clinically indicated    GROVER Cui III, MD, FACS  Professor and Chief, Vascular and Endovascular Surgery

## 2022-06-14 ENCOUNTER — OFFICE VISIT (OUTPATIENT)
Dept: CARDIOLOGY | Facility: CLINIC | Age: 62
End: 2022-06-14
Payer: MEDICAID

## 2022-06-14 VITALS
SYSTOLIC BLOOD PRESSURE: 133 MMHG | HEIGHT: 64 IN | OXYGEN SATURATION: 98 % | DIASTOLIC BLOOD PRESSURE: 71 MMHG | WEIGHT: 141.38 LBS | HEART RATE: 67 BPM | BODY MASS INDEX: 24.14 KG/M2

## 2022-06-14 DIAGNOSIS — I10 ESSENTIAL HYPERTENSION: ICD-10-CM

## 2022-06-14 DIAGNOSIS — I10 PRIMARY HYPERTENSION: Primary | ICD-10-CM

## 2022-06-14 DIAGNOSIS — I65.22 STENOSIS OF LEFT CAROTID ARTERY: ICD-10-CM

## 2022-06-14 DIAGNOSIS — E78.00 PURE HYPERCHOLESTEROLEMIA: ICD-10-CM

## 2022-06-14 DIAGNOSIS — E78.00 HYPERCHOLESTEREMIA: ICD-10-CM

## 2022-06-14 PROCEDURE — 4010F PR ACE/ARB THEARPY RXD/TAKEN: ICD-10-PCS | Mod: CPTII,,, | Performed by: INTERNAL MEDICINE

## 2022-06-14 PROCEDURE — 3044F PR MOST RECENT HEMOGLOBIN A1C LEVEL <7.0%: ICD-10-PCS | Mod: CPTII,,, | Performed by: INTERNAL MEDICINE

## 2022-06-14 PROCEDURE — 99204 OFFICE O/P NEW MOD 45 MIN: CPT | Mod: S$PBB,,, | Performed by: INTERNAL MEDICINE

## 2022-06-14 PROCEDURE — 99204 PR OFFICE/OUTPT VISIT, NEW, LEVL IV, 45-59 MIN: ICD-10-PCS | Mod: S$PBB,,, | Performed by: INTERNAL MEDICINE

## 2022-06-14 PROCEDURE — 1159F PR MEDICATION LIST DOCUMENTED IN MEDICAL RECORD: ICD-10-PCS | Mod: CPTII,,, | Performed by: INTERNAL MEDICINE

## 2022-06-14 PROCEDURE — 1160F PR REVIEW ALL MEDS BY PRESCRIBER/CLIN PHARMACIST DOCUMENTED: ICD-10-PCS | Mod: CPTII,,, | Performed by: INTERNAL MEDICINE

## 2022-06-14 PROCEDURE — 99999 PR PBB SHADOW E&M-EST. PATIENT-LVL III: CPT | Mod: PBBFAC,,, | Performed by: INTERNAL MEDICINE

## 2022-06-14 PROCEDURE — 3075F PR MOST RECENT SYSTOLIC BLOOD PRESS GE 130-139MM HG: ICD-10-PCS | Mod: CPTII,,, | Performed by: INTERNAL MEDICINE

## 2022-06-14 PROCEDURE — 3078F PR MOST RECENT DIASTOLIC BLOOD PRESSURE < 80 MM HG: ICD-10-PCS | Mod: CPTII,,, | Performed by: INTERNAL MEDICINE

## 2022-06-14 PROCEDURE — 3078F DIAST BP <80 MM HG: CPT | Mod: CPTII,,, | Performed by: INTERNAL MEDICINE

## 2022-06-14 PROCEDURE — 1160F RVW MEDS BY RX/DR IN RCRD: CPT | Mod: CPTII,,, | Performed by: INTERNAL MEDICINE

## 2022-06-14 PROCEDURE — 4010F ACE/ARB THERAPY RXD/TAKEN: CPT | Mod: CPTII,,, | Performed by: INTERNAL MEDICINE

## 2022-06-14 PROCEDURE — 93010 ELECTROCARDIOGRAM REPORT: CPT | Mod: S$PBB,,, | Performed by: INTERNAL MEDICINE

## 2022-06-14 PROCEDURE — 3008F PR BODY MASS INDEX (BMI) DOCUMENTED: ICD-10-PCS | Mod: CPTII,,, | Performed by: INTERNAL MEDICINE

## 2022-06-14 PROCEDURE — 93010 EKG 12-LEAD: ICD-10-PCS | Mod: S$PBB,,, | Performed by: INTERNAL MEDICINE

## 2022-06-14 PROCEDURE — 99213 OFFICE O/P EST LOW 20 MIN: CPT | Mod: PBBFAC,PN | Performed by: INTERNAL MEDICINE

## 2022-06-14 PROCEDURE — 1159F MED LIST DOCD IN RCRD: CPT | Mod: CPTII,,, | Performed by: INTERNAL MEDICINE

## 2022-06-14 PROCEDURE — 3008F BODY MASS INDEX DOCD: CPT | Mod: CPTII,,, | Performed by: INTERNAL MEDICINE

## 2022-06-14 PROCEDURE — 99999 PR PBB SHADOW E&M-EST. PATIENT-LVL III: ICD-10-PCS | Mod: PBBFAC,,, | Performed by: INTERNAL MEDICINE

## 2022-06-14 PROCEDURE — 3044F HG A1C LEVEL LT 7.0%: CPT | Mod: CPTII,,, | Performed by: INTERNAL MEDICINE

## 2022-06-14 PROCEDURE — 93005 ELECTROCARDIOGRAM TRACING: CPT | Mod: PBBFAC,PN | Performed by: INTERNAL MEDICINE

## 2022-06-14 PROCEDURE — 3075F SYST BP GE 130 - 139MM HG: CPT | Mod: CPTII,,, | Performed by: INTERNAL MEDICINE

## 2022-06-14 RX ORDER — LOSARTAN POTASSIUM 25 MG/1
25 TABLET ORAL DAILY
Qty: 90 TABLET | Refills: 3 | Status: SHIPPED | OUTPATIENT
Start: 2022-06-14 | End: 2023-06-07

## 2022-06-14 RX ORDER — ATORVASTATIN CALCIUM 20 MG/1
20 TABLET, FILM COATED ORAL DAILY
Qty: 90 TABLET | Refills: 3 | Status: SHIPPED | OUTPATIENT
Start: 2022-06-14 | End: 2022-09-26

## 2022-06-14 NOTE — PROGRESS NOTES
[facility] Cardiology     Subjective:    Patient ID:  Nakul Britton is a 61 y.o. male who presents for evaluation of Hyperlipidemia, Carotid Artery Disease, and Hypertension    Review of patient's allergies indicates:   Allergen Reactions    Center-al house dust Shortness Of Breath    House dust mite Shortness Of Breath    Olive oil Rash      The patient is here for management of carotid disease and hyperlipidemia.  A carotid bruit was noted on the left.  He had complained of episodes of lightheadedness and faintness.  There was no focal neurologic deficits.  His carotid ultrasound performed March 24, 2022 confirmed 70% left 50% right disease by ultrasound.  His peak internal carotid artery velocity on the left was 284 centimeters/second.  He did not know he had any vascular disease.  He does not smoke.  He exercises regularly.    Vascular surgery recommended conservative management for now.  A 6 month follow-up ultrasound was advised.  The patient started atorvastatin for  mg % range.  He felt that 40 mg was causing side effects so he dropped the dose to 20 mg.  He has not had repeat labs since then.  He states his blood pressures commonly run 130 systolic range.  He has never been on blood pressure therapy.  He has no complaints when he exercises.  He denies shortness of breath or chest pain.  He did start an aspirin.  He has asthma.  It is controlled.    The patient's electrocardiogram reviewed and independently interpreted by myself today confirms heart rate 68 normal tracing.      Review of Systems   Constitutional: Negative for chills, decreased appetite, diaphoresis, fever, malaise/fatigue, night sweats, weight gain and weight loss.   HENT: Negative for congestion, ear discharge, ear pain, hearing loss, hoarse voice, nosebleeds, odynophagia, sore throat, stridor and tinnitus.    Eyes: Negative for blurred vision, discharge,  double vision, pain, photophobia, redness, vision loss in left eye, vision loss in right eye, visual disturbance and visual halos.   Cardiovascular: Negative for chest pain, claudication, cyanosis, dyspnea on exertion, irregular heartbeat, leg swelling, near-syncope, orthopnea, palpitations, paroxysmal nocturnal dyspnea and syncope.   Respiratory: Negative for cough, hemoptysis, shortness of breath, sleep disturbances due to breathing, snoring, sputum production and wheezing.    Endocrine: Negative for cold intolerance, heat intolerance, polydipsia, polyphagia and polyuria.   Hematologic/Lymphatic: Negative for adenopathy and bleeding problem. Does not bruise/bleed easily.   Skin: Negative for color change, dry skin, flushing, itching, nail changes, poor wound healing, rash, skin cancer, suspicious lesions and unusual hair distribution.   Musculoskeletal: Negative for arthritis, back pain, falls, gout, joint pain, joint swelling, muscle cramps, muscle weakness, myalgias, neck pain and stiffness.   Gastrointestinal: Negative for bloating, abdominal pain, anorexia, change in bowel habit, bowel incontinence, constipation, diarrhea, dysphagia, excessive appetite, flatus, heartburn, hematemesis, hematochezia, hemorrhoids, jaundice, melena, nausea and vomiting.   Genitourinary: Negative for bladder incontinence, decreased libido, dysuria, flank pain, frequency, genital sores, hematuria, hesitancy, incomplete emptying, nocturia and urgency.   Neurological: Negative for aphonia, brief paralysis, difficulty with concentration, disturbances in coordination, excessive daytime sleepiness, dizziness, focal weakness, headaches, light-headedness, loss of balance, numbness, paresthesias, seizures, sensory change, tremors, vertigo and weakness.   Psychiatric/Behavioral: Negative for altered mental status, depression, hallucinations, memory loss, substance abuse, suicidal ideas and thoughts of violence. The patient is  "nervous/anxious. The patient does not have insomnia.    Allergic/Immunologic: Negative for hives and persistent infections.        Objective:       Vitals:    06/14/22 1530   BP: 133/71   Pulse: 67   SpO2: 98%   Weight: 64.1 kg (141 lb 6.4 oz)   Height: 5' 4" (1.626 m)    Physical Exam  Constitutional:       General: He is not in acute distress.     Appearance: He is well-developed. He is not diaphoretic.   HENT:      Head: Normocephalic and atraumatic.      Nose: Nose normal.   Eyes:      General: No scleral icterus.        Right eye: No discharge.      Conjunctiva/sclera: Conjunctivae normal.      Pupils: Pupils are equal, round, and reactive to light.   Neck:      Thyroid: No thyromegaly.      Vascular: No JVD.      Trachea: No tracheal deviation.   Cardiovascular:      Rate and Rhythm: Normal rate and regular rhythm.      Pulses:           Carotid pulses are 2+ on the right side and 2+ on the left side with bruit.       Radial pulses are 2+ on the right side and 2+ on the left side.        Dorsalis pedis pulses are 2+ on the right side and 2+ on the left side.        Posterior tibial pulses are 2+ on the right side and 2+ on the left side.      Heart sounds: Normal heart sounds. No murmur heard.    No friction rub. No gallop.   Pulmonary:      Effort: Pulmonary effort is normal. No respiratory distress.      Breath sounds: Normal breath sounds. No stridor. No wheezing or rales.   Chest:      Chest wall: No tenderness.   Abdominal:      General: Bowel sounds are normal. There is no distension.      Palpations: Abdomen is soft. There is no mass.      Tenderness: There is no abdominal tenderness. There is no guarding or rebound.   Musculoskeletal:         General: No tenderness. Normal range of motion.      Cervical back: Normal range of motion and neck supple.   Lymphadenopathy:      Cervical: No cervical adenopathy.   Skin:     General: Skin is warm and dry.      Coloration: Skin is not pale.      Findings: No " erythema or rash.   Neurological:      Mental Status: He is alert and oriented to person, place, and time.      Cranial Nerves: No cranial nerve deficit.      Coordination: Coordination normal.   Psychiatric:         Behavior: Behavior normal.         Thought Content: Thought content normal.         Judgment: Judgment normal.           Assessment:       1. Primary hypertension    2. Hypercholesteremia    3. Stenosis of left carotid artery    4. Pure hypercholesterolemia    5. Essential hypertension      Results for orders placed or performed during the hospital encounter of 04/01/22   CV Ultrasound Bilateral Doppler Carotid   Result Value Ref Range    Left ICA/CCA ratio 4.15     Right ICA/CCA ratio 1.82     Left Highest .00     Left Highest CCA 81     Right Highest .00     Right Highest CCA 77     Right Highest EDV 23.00     LT Highest EDV 69.00     Right CCA prox sys 60 cm/s    Right CCA prox ahn 8 cm/s    Right CCA dist sys 55 cm/s    Right CCA dist ahn 8 cm/s    Right ICA prox sys 71 cm/s    Right ICA prox ahn 17 cm/s    Right ICA mid sys 85 cm/s    Right ICA mid ahn 20 cm/s    Right ICA dist sys 100 cm/s    Right ICA dist ahn 23 cm/s    Right ECA sys 60 cm/s    Right vertebral sys 39 cm/s    Left CCA prox sys 81 cm/s    Left CCA prox ahn 12 cm/s    Left CCA dist sys 66 cm/s    Left CCA dist ahn 14 cm/s    Left ICA prox sys 274 cm/s    Left ICA prox ahn 69 cm/s    Left ICA mid sys 204 cm/s    Left ICA mid ahn 44 cm/s    Left ICA dist sys 113 cm/s    Left ICA dist ahn 29 cm/s    Left ECA sys 100 cm/s    Left vertebral sys 33 cm/s    Right arm systolic blood pressure 180 mmHg    Right arm diastolic blood pressure 80 mmHg    Left arm systolic blood pressure 180 mmHg    Left arm diastolic blood pressure 80 mmHg    Left CCA mid sys 70 cm/s    Left CCA mid ahn 16 cm/s    Right CCA mid sys 77 cm/s    Right CCA mid ahn 7 cm/s    Right vertebral ahn 5 cm/s    Right ECA ahn 5 cm/s    Left  vertebral ahn 6 cm/s    Left ECA ahn 8 cm/s    Left CBA sys 69 cm/s    Left CBA ahn 13 cm/s    Rigth CBA sys 59 cm/s    Right CBA ahn 12 cm/s         Current Outpatient Medications:     albuterol (PROVENTIL/VENTOLIN HFA) 90 mcg/actuation inhaler, INHALE 2 PUFFS BY MOUTH FOUR TIMES DAILY AS NEEDED, Disp: 18 g, Rfl: 2    aspirin (ECOTRIN) 81 MG EC tablet, Take 1 tablet (81 mg total) by mouth once daily., Disp: 90 tablet, Rfl: 3    atorvastatin (LIPITOR) 20 MG tablet, Take 1 tablet (20 mg total) by mouth once daily., Disp: 90 tablet, Rfl: 3    fluticasone-salmeterol diskus inhaler 500-50 mcg, Inhale 1 puff into the lungs 2 (two) times daily. Controller, Disp: 60 each, Rfl: 11    losartan (COZAAR) 25 MG tablet, Take 1 tablet (25 mg total) by mouth once daily., Disp: 90 tablet, Rfl: 3    montelukast (SINGULAIR) 10 mg tablet, Take 1 tablet (10 mg total) by mouth every evening., Disp: 90 tablet, Rfl: 1    predniSONE (DELTASONE) 10 MG tablet, 3 tabs X 7 days then 2 tabs X 7 days/stop, Disp: 36 tablet, Rfl: 2    sildenafiL (VIAGRA) 100 MG tablet, TAKE ONE DAILY AS NEEDED FOR ERECTILE DYSFUNCTION, Disp: 30 tablet, Rfl: 5     Lab Results   Component Value Date    WBC 6.71 03/18/2022    RBC 5.01 03/18/2022    HGB 14.3 03/18/2022    HCT 46.9 03/18/2022    MCV 94 03/18/2022    MCH 28.5 03/18/2022    MCHC 30.5 (L) 03/18/2022    RDW 16.4 (H) 03/18/2022     03/18/2022    MPV 10.2 03/18/2022    GRAN 3.9 03/18/2022    GRAN 58.3 03/18/2022    LYMPH 1.8 03/18/2022    LYMPH 26.2 03/18/2022    MONO 0.7 03/18/2022    MONO 10.3 03/18/2022    EOS 0.3 03/18/2022    BASO 0.07 03/18/2022    EOSINOPHIL 3.9 03/18/2022    BASOPHIL 1.0 03/18/2022        CMP  Lab Results   Component Value Date     03/18/2022    K 4.8 03/18/2022     03/18/2022    CO2 26 03/18/2022    GLU 96 03/18/2022    BUN 15 03/18/2022    CREATININE 0.8 03/18/2022    CALCIUM 9.7 03/18/2022    PROT 7.2 03/18/2022    ALBUMIN 3.7 03/18/2022    BILITOT  0.4 03/18/2022    ALKPHOS 65 03/18/2022    AST 20 03/18/2022    ALT 19 03/18/2022    ANIONGAP 12 03/18/2022    ESTGFRAFRICA >60.0 03/18/2022    EGFRNONAA >60.0 03/18/2022        No results found for: LABBLOO, LABURIN, RESPIRATORYC, GSRESP         Results for orders placed or performed in visit on 06/14/22   EKG 12-lead    Collection Time: 06/14/22  3:15 PM    Narrative    Test Reason : E78.00,    Vent. Rate : 068 BPM     Atrial Rate : 068 BPM     P-R Int : 160 ms          QRS Dur : 094 ms      QT Int : 402 ms       P-R-T Axes : 071 042 052 degrees     QTc Int : 427 ms    Normal sinus rhythm  Possible Left atrial enlargement  Borderline Abnormal ECG  When compared with ECG of 26-FEB-2008 13:20,  No significant change was found  Confirmed by Rajinder WINSLOW MD, Stephan CHEN ([id number]) on 6/15/2022 11:29:44 AM    Referred By: MAISHA ADAMS           Confirmed By:Stephan Calvillo III, MD        CV Ultrasound Bilateral Doppler Carotid 04/01/2022 [id number] Final   X-Ray Chest PA And Lateral 01/21/2021 [id number] Final            Plan:       Problem List Items Addressed This Visit        Cardiac/Vascular    Hyperlipidemia     Atorvastatin to 40 mg ordered for  by labs March 2022. Repeat labs upcoming.  Atorvastatin 20 mg well tolerated thus far.  Zetia treatment most likely to be needed.  I think increased doses of atorvastatin would be tolerated but he got off to a bad start with 40 mg daily and cut the dose on his own.           Stenosis of left carotid artery     He follows with vascular surgery.  A for 6 month follow-up duplex ultrasound study planned for September 2022. He remains asymptomatic neurologically.  Contralateral right side confirms less severity approximately 50% stenosis.             Essential hypertension     I reviewed blood pressure readings with the patient.  They are consistently in the 130 range.  Losartan 25 mg advised.  It was prescribed.  He will pick it up.             Other Visit Diagnoses     Primary  hypertension    -  Primary    Relevant Medications    losartan (COZAAR) 25 MG tablet    Hypercholesteremia        Relevant Medications    atorvastatin (LIPITOR) 20 MG tablet    Other Relevant Orders    EKG 12-lead (Completed)    Lipid Panel    Comprehensive Metabolic Panel             A 6 month follow-up was advised.      Thank you for allowing me to participate in your patient's care.        Jack Draper MD  06/17/2022   4:11 PM

## 2022-06-17 PROBLEM — I10 ESSENTIAL HYPERTENSION: Status: ACTIVE | Noted: 2022-06-17

## 2022-06-17 NOTE — ASSESSMENT & PLAN NOTE
I reviewed blood pressure readings with the patient.  They are consistently in the 130 range.  Losartan 25 mg advised.  It was prescribed.  He will pick it up.

## 2022-06-17 NOTE — ASSESSMENT & PLAN NOTE
He follows with vascular surgery.  A for 6 month follow-up duplex ultrasound study planned for September 2022. He remains asymptomatic neurologically.  Contralateral right side confirms less severity approximately 50% stenosis.

## 2022-06-17 NOTE — ASSESSMENT & PLAN NOTE
Atorvastatin to 40 mg ordered for  by labs March 2022. Repeat labs upcoming.  Atorvastatin 20 mg well tolerated thus far.  Zetia treatment most likely to be needed.  I think increased doses of atorvastatin would be tolerated but he got off to a bad start with 40 mg daily and cut the dose on his own.

## 2022-06-24 ENCOUNTER — LAB VISIT (OUTPATIENT)
Dept: LAB | Facility: HOSPITAL | Age: 62
End: 2022-06-24
Attending: INTERNAL MEDICINE
Payer: MEDICAID

## 2022-06-24 DIAGNOSIS — E78.2 MIXED HYPERLIPIDEMIA: ICD-10-CM

## 2022-06-24 LAB
ALBUMIN SERPL BCP-MCNC: 4 G/DL (ref 3.5–5.2)
ALP SERPL-CCNC: 71 U/L (ref 55–135)
ALT SERPL W/O P-5'-P-CCNC: 26 U/L (ref 10–44)
ANION GAP SERPL CALC-SCNC: 7 MMOL/L (ref 8–16)
AST SERPL-CCNC: 22 U/L (ref 10–40)
BILIRUB SERPL-MCNC: 0.4 MG/DL (ref 0.1–1)
BUN SERPL-MCNC: 16 MG/DL (ref 8–23)
CALCIUM SERPL-MCNC: 9.7 MG/DL (ref 8.7–10.5)
CHLORIDE SERPL-SCNC: 107 MMOL/L (ref 95–110)
CHOLEST SERPL-MCNC: 121 MG/DL (ref 120–199)
CHOLEST/HDLC SERPL: 3.4 {RATIO} (ref 2–5)
CO2 SERPL-SCNC: 25 MMOL/L (ref 23–29)
CREAT SERPL-MCNC: 0.8 MG/DL (ref 0.5–1.4)
EST. GFR  (AFRICAN AMERICAN): >60 ML/MIN/1.73 M^2
EST. GFR  (NON AFRICAN AMERICAN): >60 ML/MIN/1.73 M^2
GLUCOSE SERPL-MCNC: 100 MG/DL (ref 70–110)
HDLC SERPL-MCNC: 36 MG/DL (ref 40–75)
HDLC SERPL: 29.8 % (ref 20–50)
LDLC SERPL CALC-MCNC: 69 MG/DL (ref 63–159)
NONHDLC SERPL-MCNC: 85 MG/DL
POTASSIUM SERPL-SCNC: 4.3 MMOL/L (ref 3.5–5.1)
PROT SERPL-MCNC: 6.8 G/DL (ref 6–8.4)
SODIUM SERPL-SCNC: 139 MMOL/L (ref 136–145)
TRIGL SERPL-MCNC: 80 MG/DL (ref 30–150)

## 2022-06-24 PROCEDURE — 36415 COLL VENOUS BLD VENIPUNCTURE: CPT | Mod: PO | Performed by: INTERNAL MEDICINE

## 2022-06-24 PROCEDURE — 80061 LIPID PANEL: CPT | Performed by: INTERNAL MEDICINE

## 2022-06-24 PROCEDURE — 80053 COMPREHEN METABOLIC PANEL: CPT | Performed by: INTERNAL MEDICINE

## 2022-07-27 DIAGNOSIS — J45.40 MODERATE PERSISTENT ASTHMA WITHOUT COMPLICATION: ICD-10-CM

## 2022-07-27 RX ORDER — MONTELUKAST SODIUM 10 MG/1
TABLET ORAL
Qty: 90 TABLET | Refills: 2 | Status: SHIPPED | OUTPATIENT
Start: 2022-07-27 | End: 2022-09-23 | Stop reason: SDUPTHER

## 2022-07-27 NOTE — TELEPHONE ENCOUNTER
No new care gaps identified.  [facility] Embedded Care Gaps. Reference number: [id number]. 7/27/2022   5:39:51 AM ARDHAT

## 2022-07-28 NOTE — TELEPHONE ENCOUNTER
Refill Decision Note   Nakul Britton  is requesting a refill authorization.  Brief Assessment and Rationale for Refill:  Approve     Medication Therapy Plan:       Medication Reconciliation Completed: No   Comments:     No Care Gaps recommended.     Note composed:10:22 PM 07/27/2022

## 2022-08-17 ENCOUNTER — TELEPHONE (OUTPATIENT)
Dept: INTERNAL MEDICINE | Facility: CLINIC | Age: 62
End: 2022-08-17
Payer: MEDICAID

## 2022-08-17 NOTE — TELEPHONE ENCOUNTER
----- Message from Andie Jeffnhnikunj sent at 8/16/2022  2:51 PM CDT -----  Contact: [phone] Patient  Caller is requesting an earlier appointment then we can schedule.  Caller is requesting a message be sent to the provider.  If this is for urgent care symptoms, did you offer other providers at this location, providers at other locations, or [facility]? (yes, no, n/a):    If this is for the patients physical, did you offer to schedule next available and put on wait list, or to see NP or PA for their physical?  (yes, no, n/a):    When is the next available appointment with their provider:  03/23  Reason for the appointment:  Annual  Patient preference of timeframe to be scheduled:  next 2 months.  Would the patient like a call back, or a response through their [facility] portal?:  Call Back  Comments:   Pt stated he needs CV Ultrasound Bilateral Doppler Carotid. Can you please put an order in.

## 2022-09-07 ENCOUNTER — TELEPHONE (OUTPATIENT)
Dept: VASCULAR SURGERY | Facility: CLINIC | Age: 62
End: 2022-09-07
Payer: MEDICAID

## 2022-09-07 DIAGNOSIS — J45.901 ASTHMA EXACERBATION IN COPD: ICD-10-CM

## 2022-09-07 DIAGNOSIS — J44.1 ASTHMA EXACERBATION IN COPD: ICD-10-CM

## 2022-09-07 RX ORDER — PREDNISONE 10 MG/1
TABLET ORAL
Qty: 36 TABLET | Refills: 2 | Status: SHIPPED | OUTPATIENT
Start: 2022-09-07 | End: 2022-09-28 | Stop reason: ALTCHOICE

## 2022-09-07 NOTE — TELEPHONE ENCOUNTER
"Contacted pt in response to message. States he would like to have ultrasound sooner than December as he feels "more blood flow in my head" and would like to make sure there are no new problems. Appointment scheduled, pt verified.   ----- Message from Debbie Ricketts sent at 9/7/2022 10:45 AM CDT -----  Regarding: Sooner Appointment  Contact: [phone]  Calling in regards to scheduling vascular ultrasound. Please call    "

## 2022-09-13 ENCOUNTER — HOSPITAL ENCOUNTER (OUTPATIENT)
Dept: VASCULAR SURGERY | Facility: CLINIC | Age: 62
Discharge: HOME | End: 2022-09-13
Attending: SURGERY
Payer: MEDICAID

## 2022-09-13 ENCOUNTER — OFFICE VISIT (OUTPATIENT)
Dept: VASCULAR SURGERY | Facility: CLINIC | Age: 62
End: 2022-09-13
Payer: MEDICAID

## 2022-09-13 VITALS
HEART RATE: 64 BPM | SYSTOLIC BLOOD PRESSURE: 118 MMHG | DIASTOLIC BLOOD PRESSURE: 59 MMHG | HEIGHT: 64 IN | BODY MASS INDEX: 24.46 KG/M2 | WEIGHT: 143.31 LBS | TEMPERATURE: 99 F

## 2022-09-13 DIAGNOSIS — I65.22 STENOSIS OF LEFT CAROTID ARTERY: Primary | ICD-10-CM

## 2022-09-13 DIAGNOSIS — I65.22 STENOSIS OF LEFT CAROTID ARTERY: ICD-10-CM

## 2022-09-13 PROCEDURE — 99214 PR OFFICE/OUTPT VISIT, EST, LEVL IV, 30-39 MIN: ICD-10-PCS | Mod: S$PBB,,, | Performed by: SURGERY

## 2022-09-13 PROCEDURE — 4010F PR ACE/ARB THEARPY RXD/TAKEN: ICD-10-PCS | Mod: CPTII,,, | Performed by: SURGERY

## 2022-09-13 PROCEDURE — 3074F PR MOST RECENT SYSTOLIC BLOOD PRESSURE < 130 MM HG: ICD-10-PCS | Mod: CPTII,,, | Performed by: SURGERY

## 2022-09-13 PROCEDURE — 4010F ACE/ARB THERAPY RXD/TAKEN: CPT | Mod: CPTII,,, | Performed by: SURGERY

## 2022-09-13 PROCEDURE — 1159F MED LIST DOCD IN RCRD: CPT | Mod: CPTII,,, | Performed by: SURGERY

## 2022-09-13 PROCEDURE — 3008F PR BODY MASS INDEX (BMI) DOCUMENTED: ICD-10-PCS | Mod: CPTII,,, | Performed by: SURGERY

## 2022-09-13 PROCEDURE — 93880 EXTRACRANIAL BILAT STUDY: CPT | Mod: PBBFAC | Performed by: SURGERY

## 2022-09-13 PROCEDURE — 1159F PR MEDICATION LIST DOCUMENTED IN MEDICAL RECORD: ICD-10-PCS | Mod: CPTII,,, | Performed by: SURGERY

## 2022-09-13 PROCEDURE — 93880 PR DUPLEX SCAN EXTRACRANIAL,BILAT: ICD-10-PCS | Mod: 26,S$PBB,, | Performed by: SURGERY

## 2022-09-13 PROCEDURE — 3074F SYST BP LT 130 MM HG: CPT | Mod: CPTII,,, | Performed by: SURGERY

## 2022-09-13 PROCEDURE — 99214 OFFICE O/P EST MOD 30 MIN: CPT | Mod: S$PBB,,, | Performed by: SURGERY

## 2022-09-13 PROCEDURE — 3078F DIAST BP <80 MM HG: CPT | Mod: CPTII,,, | Performed by: SURGERY

## 2022-09-13 PROCEDURE — 3044F HG A1C LEVEL LT 7.0%: CPT | Mod: CPTII,,, | Performed by: SURGERY

## 2022-09-13 PROCEDURE — 93880 EXTRACRANIAL BILAT STUDY: CPT | Mod: 26,S$PBB,, | Performed by: SURGERY

## 2022-09-13 PROCEDURE — 99213 OFFICE O/P EST LOW 20 MIN: CPT | Mod: PBBFAC | Performed by: SURGERY

## 2022-09-13 PROCEDURE — 3008F BODY MASS INDEX DOCD: CPT | Mod: CPTII,,, | Performed by: SURGERY

## 2022-09-13 PROCEDURE — 99999 PR PBB SHADOW E&M-EST. PATIENT-LVL III: CPT | Mod: PBBFAC,,, | Performed by: SURGERY

## 2022-09-13 PROCEDURE — 3078F PR MOST RECENT DIASTOLIC BLOOD PRESSURE < 80 MM HG: ICD-10-PCS | Mod: CPTII,,, | Performed by: SURGERY

## 2022-09-13 PROCEDURE — 3044F PR MOST RECENT HEMOGLOBIN A1C LEVEL <7.0%: ICD-10-PCS | Mod: CPTII,,, | Performed by: SURGERY

## 2022-09-13 PROCEDURE — 99999 PR PBB SHADOW E&M-EST. PATIENT-LVL III: ICD-10-PCS | Mod: PBBFAC,,, | Performed by: SURGERY

## 2022-09-13 NOTE — PROGRESS NOTES
VASCULAR SURGERY SERVICE    REFERRING DOCTOR: SHELLY Ornelas MD    CHIEF COMPLAINT:  Carotid stenosis    HISTORY OF PRESENT ILLNESS: Nakul Britton is a 61 y.o. male no prior history of stroke TIA or amaurosis, no history of coronary artery disease, with a recently discovered 70-80% left carotid stenosis prompted by finding of carotid bruit.  Prior to this he was on no statin, was recently prescribed Lipitor 40 mg daily.  Said he did not tolerate this dose but after cutting and half was able to take it.  He is not taking any aspirin currently, although also prescribed to him recently    He endorses a 40 year history of diffuse right-sided numbness she says has largely resolved since he began starting statin therapy.\    Reports that he can walk 5 stairs without difficulty.  He does not exercise on a regular basis but says it occasionally goes for job without any trouble.  He does have asthma and is on 2 inhalers.  States that he needs steroid tapers once or twice a year with asthma flares    He has no history of neck irradiation    6/7/2022:  This is a 3 week follow-up.  Continues to be neurologically asymptomatic.  As per my prior note I spent nearly an hour discussing treatment alternatives regarding his asymptomatic carotid disease.  He wishes to proceed medical therapy    9/13/2022:   He states he is currently taking only 20 mg of Lipitor every other day, as well as aspirin every other day.  States that the days he takes this low dose Lipitor that he feels lightheaded.  Denies interval stroke TIA or amaurosis    Past Medical History:   Diagnosis Date    Allergy     Asthma     Essential hypertension 6/17/2022    Hiatal hernia        Past Surgical History:   Procedure Laterality Date    CATARACT EXTRACTION           Current Outpatient Medications:     albuterol (PROVENTIL/VENTOLIN HFA) 90 mcg/actuation inhaler, INHALE 2 PUFFS BY MOUTH FOUR TIMES DAILY AS NEEDED, Disp: 54 g, Rfl: 0    aspirin (ECOTRIN) 81 MG  "EC tablet, Take 1 tablet (81 mg total) by mouth once daily., Disp: 90 tablet, Rfl: 3    atorvastatin (LIPITOR) 20 MG tablet, Take 1 tablet (20 mg total) by mouth once daily., Disp: 90 tablet, Rfl: 3    losartan (COZAAR) 25 MG tablet, Take 1 tablet (25 mg total) by mouth once daily., Disp: 90 tablet, Rfl: 3    montelukast (SINGULAIR) 10 mg tablet, TAKE 1 TABLET(10 MG) BY MOUTH EVERY EVENING, Disp: 90 tablet, Rfl: 2    predniSONE (DELTASONE) 10 MG tablet, 3 tabs X 7 days then 2 tabs X 7 days/stop, Disp: 36 tablet, Rfl: 2    sildenafiL (VIAGRA) 100 MG tablet, TAKE ONE DAILY AS NEEDED FOR ERECTILE DYSFUNCTION, Disp: 30 tablet, Rfl: 5    fluticasone-salmeterol diskus inhaler 500-50 mcg, Inhale 1 puff into the lungs 2 (two) times daily. Controller, Disp: 60 each, Rfl: 11    Review of patient's allergies indicates:   Allergen Reactions    Center-al house dust Shortness Of Breath    House dust mite Shortness Of Breath    [place] oil Rash       Family History   Problem Relation Age of Onset    No Known Problems Mother     No Known Problems Father        Social History     Tobacco Use    Smoking status: Never    Smokeless tobacco: Never   Substance Use Topics    Alcohol use: Yes     Alcohol/week: 0.0 standard drinks     Comment: 4-6 times per year, socially    Drug use: No     PHYSICAL EXAM:   BP (!) 118/59 (BP Location: Right arm, Patient Position: Sitting, BP Method: Large (Automatic))   Pulse 64   Temp 98.6 °F (37 °C) (Oral)   Ht 5' 4" (1.626 m)   Wt 65 kg (143 lb 4.8 oz)   BMI 24.60 kg/m²   Constitutional:  Alert,   Well-appearing  In no distress.   Neurological: Normal speech  no focal findings  CN II - XII grossly intact.    Psychiatric: Mood and affect appropriate and symmetric.   HEENT: Normocephalic / atraumatic  PERRLA  Midline trachea  No scars across the neck   Cardiac: Regular rate and rhythm.   Pulmonary: Normal pulmonary effort.   Abdomen: Soft, not distended.     Skin: Warm and well perfused.    Vascular:  " Radial pulses are 2+   Extremities/  Musculoskeletal: No edema.        IMAGING:  Duplex today shows no significant right carotid stenosis, and a somewhat progressive left carotid stenosis now 80 -99%, with peak velocity of 350, and end-diastolic of 107.  Both these velocities are progressive from prior as below    5/22 Carotid duplex (cardiology) shows no significant right carotid stenosis, and a 70-80% left carotid stenosis with a peak velocity of 274, end-diastolic velocity of 69, ratio of 4.15    IMPRESSION:  80-99 % left carotid stenosis, neurologically asymptomatic.  Degree of stenosis is modestly progressive    Given his challenges with taking statin medicine, and the progression of his carotid stenosis, I have recommended definitive therapy with carotid endarterectomy.  After significant discussion, he is leaning toward surgery but is not ready to commit to a date.  He is asked to call my office when he makes up his mind    PLAN:  1.  Underscored the importance of compliance in taking aspirin 81 mg daily  2.  follow up with his cardiologist to consider a different statin which may be better tolerated by him      GROVER uCi III, MD, FACS  Professor and Chief, Vascular and Endovascular Surgery

## 2022-09-21 ENCOUNTER — PATIENT MESSAGE (OUTPATIENT)
Dept: CARDIOLOGY | Facility: CLINIC | Age: 62
End: 2022-09-21
Payer: MEDICAID

## 2022-09-23 DIAGNOSIS — J45.40 MODERATE PERSISTENT ASTHMA WITHOUT COMPLICATION: ICD-10-CM

## 2022-09-23 NOTE — TELEPHONE ENCOUNTER
No new care gaps identified.  [facility] Embedded Care Gaps. Reference number: [id number]. 9/23/2022   12:26:00 PM CDT

## 2022-09-25 DIAGNOSIS — J45.40 MODERATE PERSISTENT ASTHMA WITHOUT COMPLICATION: ICD-10-CM

## 2022-09-25 RX ORDER — ALBUTEROL SULFATE 90 UG/1
AEROSOL, METERED RESPIRATORY (INHALATION)
Qty: 54 G | Refills: 1 | Status: SHIPPED | OUTPATIENT
Start: 2022-09-25 | End: 2024-01-10 | Stop reason: SDUPTHER

## 2022-09-25 NOTE — TELEPHONE ENCOUNTER
No new care gaps identified.  [facility] Embedded Care Gaps. Reference number: [id number]. 9/25/2022   1:02:03 PM CDT

## 2022-09-25 NOTE — TELEPHONE ENCOUNTER
Refill Decision Note   Nakul Britton  is requesting a refill authorization.  Brief Assessment and Rationale for Refill:  Approve     Medication Therapy Plan:       Medication Reconciliation Completed: No   Comments:     No Care Gaps recommended.     Note composed:5:00 PM 09/25/2022

## 2022-09-26 DIAGNOSIS — E78.00 HYPERCHOLESTEREMIA: Primary | ICD-10-CM

## 2022-09-26 RX ORDER — ROSUVASTATIN CALCIUM 5 MG/1
5 TABLET, COATED ORAL DAILY
Qty: 90 TABLET | Refills: 3 | Status: SHIPPED | OUTPATIENT
Start: 2022-09-26 | End: 2023-10-13 | Stop reason: SDUPTHER

## 2022-09-26 RX ORDER — MONTELUKAST SODIUM 10 MG/1
10 TABLET ORAL NIGHTLY
Qty: 90 TABLET | Refills: 2 | Status: SHIPPED | OUTPATIENT
Start: 2022-09-26 | End: 2023-05-14

## 2022-09-28 ENCOUNTER — OFFICE VISIT (OUTPATIENT)
Dept: INTERNAL MEDICINE | Facility: CLINIC | Age: 62
End: 2022-09-28
Payer: MEDICAID

## 2022-09-28 VITALS
OXYGEN SATURATION: 97 % | SYSTOLIC BLOOD PRESSURE: 118 MMHG | BODY MASS INDEX: 25.11 KG/M2 | DIASTOLIC BLOOD PRESSURE: 72 MMHG | RESPIRATION RATE: 18 BRPM | WEIGHT: 147.06 LBS | HEART RATE: 70 BPM | HEIGHT: 64 IN | TEMPERATURE: 99 F

## 2022-09-28 DIAGNOSIS — E78.2 MIXED HYPERLIPIDEMIA: ICD-10-CM

## 2022-09-28 DIAGNOSIS — I10 ESSENTIAL HYPERTENSION: Primary | ICD-10-CM

## 2022-09-28 PROCEDURE — 4010F PR ACE/ARB THEARPY RXD/TAKEN: ICD-10-PCS | Mod: CPTII,,, | Performed by: INTERNAL MEDICINE

## 2022-09-28 PROCEDURE — 3078F DIAST BP <80 MM HG: CPT | Mod: CPTII,,, | Performed by: INTERNAL MEDICINE

## 2022-09-28 PROCEDURE — 3074F SYST BP LT 130 MM HG: CPT | Mod: CPTII,,, | Performed by: INTERNAL MEDICINE

## 2022-09-28 PROCEDURE — 3044F HG A1C LEVEL LT 7.0%: CPT | Mod: CPTII,,, | Performed by: INTERNAL MEDICINE

## 2022-09-28 PROCEDURE — 4010F ACE/ARB THERAPY RXD/TAKEN: CPT | Mod: CPTII,,, | Performed by: INTERNAL MEDICINE

## 2022-09-28 PROCEDURE — 1159F MED LIST DOCD IN RCRD: CPT | Mod: CPTII,,, | Performed by: INTERNAL MEDICINE

## 2022-09-28 PROCEDURE — 99213 OFFICE O/P EST LOW 20 MIN: CPT | Mod: S$PBB,,, | Performed by: INTERNAL MEDICINE

## 2022-09-28 PROCEDURE — 99999 PR PBB SHADOW E&M-EST. PATIENT-LVL IV: ICD-10-PCS | Mod: PBBFAC,,, | Performed by: INTERNAL MEDICINE

## 2022-09-28 PROCEDURE — 3078F PR MOST RECENT DIASTOLIC BLOOD PRESSURE < 80 MM HG: ICD-10-PCS | Mod: CPTII,,, | Performed by: INTERNAL MEDICINE

## 2022-09-28 PROCEDURE — 1160F PR REVIEW ALL MEDS BY PRESCRIBER/CLIN PHARMACIST DOCUMENTED: ICD-10-PCS | Mod: CPTII,,, | Performed by: INTERNAL MEDICINE

## 2022-09-28 PROCEDURE — 99214 OFFICE O/P EST MOD 30 MIN: CPT | Mod: PBBFAC,PO | Performed by: INTERNAL MEDICINE

## 2022-09-28 PROCEDURE — 1160F RVW MEDS BY RX/DR IN RCRD: CPT | Mod: CPTII,,, | Performed by: INTERNAL MEDICINE

## 2022-09-28 PROCEDURE — 1159F PR MEDICATION LIST DOCUMENTED IN MEDICAL RECORD: ICD-10-PCS | Mod: CPTII,,, | Performed by: INTERNAL MEDICINE

## 2022-09-28 PROCEDURE — 3008F BODY MASS INDEX DOCD: CPT | Mod: CPTII,,, | Performed by: INTERNAL MEDICINE

## 2022-09-28 PROCEDURE — 3074F PR MOST RECENT SYSTOLIC BLOOD PRESSURE < 130 MM HG: ICD-10-PCS | Mod: CPTII,,, | Performed by: INTERNAL MEDICINE

## 2022-09-28 PROCEDURE — 99999 PR PBB SHADOW E&M-EST. PATIENT-LVL IV: CPT | Mod: PBBFAC,,, | Performed by: INTERNAL MEDICINE

## 2022-09-28 PROCEDURE — 3008F PR BODY MASS INDEX (BMI) DOCUMENTED: ICD-10-PCS | Mod: CPTII,,, | Performed by: INTERNAL MEDICINE

## 2022-09-28 PROCEDURE — 99213 PR OFFICE/OUTPT VISIT, EST, LEVL III, 20-29 MIN: ICD-10-PCS | Mod: S$PBB,,, | Performed by: INTERNAL MEDICINE

## 2022-09-28 PROCEDURE — 3044F PR MOST RECENT HEMOGLOBIN A1C LEVEL <7.0%: ICD-10-PCS | Mod: CPTII,,, | Performed by: INTERNAL MEDICINE

## 2022-09-28 NOTE — PROGRESS NOTES
Subjective:     Nakul Britton is a 61 y.o. male who presents for   Chief Complaint   Patient presents with    Hypertension     Follow-up.  At home goes up and down, 180 to 120, diastolic always around 70-72    Hyperlipidemia       HPI    Hypertension: The patient has been taking medications as instructed, no medication side effects noted, no chest pain on exertion, no dyspnea on exertion, and no swelling of ankles. BP is normal today but he reports SBP in 160's on some days. He cannot associate the BP elevation with anything like caffeine intake. Drink 0-3 coffees per day.    BP Readings from Last 3 Encounters:   09/28/22 118/72   09/13/22 (!) 118/59   06/14/22 133/71       Hyperlipidemia: Compliance with treatment has been fair. The patient exercises never. Patient denies muscle pain associated with his medications. He has side effects like dizziness when he takes Crestor day so he takes it every other day. Previous history of cardiac disease includes: none.    Lab Results   Component Value Date    CHOL 121 06/24/2022    HDL 36 (L) 06/24/2022        He is making plans to schedule the carotid endardectomy.      Review of Systems   Constitutional:  Negative for chills, diaphoresis and fever.   HENT:  Negative for congestion, ear pain and sinus pressure.    Eyes:  Negative for discharge and visual disturbance.   Respiratory:  Negative for cough and shortness of breath.    Cardiovascular:  Negative for chest pain and palpitations.   Gastrointestinal:  Negative for abdominal pain, constipation, diarrhea, nausea and vomiting.   Musculoskeletal:  Negative for arthralgias and myalgias.   Skin:  Negative for rash and wound.   Neurological:  Negative for dizziness, tremors, numbness and headaches.        Objective:     Physical Exam  Constitutional:       General: He is awake.      Appearance: Normal appearance. He is well-developed and well-groomed.   HENT:      Head: Normocephalic and atraumatic.      Right Ear:  Hearing and external ear normal.      Left Ear: Hearing and external ear normal.      Nose: Nose normal.      Mouth/Throat:      Mouth: Mucous membranes are moist.   Eyes:      General: Lids are normal. Vision grossly intact.      Conjunctiva/sclera:      Right eye: No hemorrhage.     Left eye: No hemorrhage.     Pupils: Pupils are equal, round, and reactive to light.   Cardiovascular:      Rate and Rhythm: Normal rate and regular rhythm.      Heart sounds: No murmur heard.  Pulmonary:      Effort: Pulmonary effort is normal. No respiratory distress.      Breath sounds: Normal breath sounds. No decreased breath sounds or wheezing.   Abdominal:      General: Bowel sounds are normal. There is no distension.   Musculoskeletal:         General: Normal range of motion.      Cervical back: Normal range of motion.      Right lower leg: No edema.      Left lower leg: No edema.   Skin:     General: Skin is warm and dry.   Neurological:      Mental Status: He is alert and oriented to person, place, and time.   Psychiatric:         Attention and Perception: Attention normal.         Mood and Affect: Mood is anxious.         Behavior: Behavior is cooperative.          Assessment:      1. Essential hypertension    2. Mixed hyperlipidemia           Plan:     1. Essential hypertension  - BP is controlled, monitor readings closely at home, continue losartan    2. Mixed hyperlipidemia  - continue Crestor    RTC in March 2023 for annual exam or sooner if needed    __________________________    Zaida Ornelas MD, PharmD  [facility]- [facility] Medicine  American Board of Obesity Medicine diplomate  Office [phone]

## 2022-10-03 ENCOUNTER — PATIENT MESSAGE (OUTPATIENT)
Dept: CARDIOLOGY | Facility: CLINIC | Age: 62
End: 2022-10-03
Payer: MEDICAID

## 2022-10-16 NOTE — TELEPHONE ENCOUNTER
----- Message from Zaida Ornelas MD sent at 4/12/2022  9:43 AM CDT -----  So the ultrasound of the carotid arteries of the neck does show lots of plaque in both the right and left carotid arteries.     The right side is an acceptable amount but the left side has too much plaque. This increases the risk for having a stroke in the future and the plaque needs to be removed.    Starting Lipitor was a good option but will recommend increasing the dose to 40mg daily if you were able to tolerate Lipitor without any side effects. Please start taking baby aspirin 81mg every day. This will also help reduce the risk.    Will refer you to Vascular Surgery who will decide when to proceed with surgery.     Home

## 2022-10-18 ENCOUNTER — PATIENT MESSAGE (OUTPATIENT)
Dept: CARDIOLOGY | Facility: CLINIC | Age: 62
End: 2022-10-18
Payer: MEDICAID

## 2023-03-08 ENCOUNTER — TELEPHONE (OUTPATIENT)
Dept: PULMONOLOGY | Facility: CLINIC | Age: 63
End: 2023-03-08
Payer: MEDICAID

## 2023-03-08 DIAGNOSIS — J45.909 ASTHMA, UNSPECIFIED ASTHMA SEVERITY, UNSPECIFIED WHETHER COMPLICATED, UNSPECIFIED WHETHER PERSISTENT: ICD-10-CM

## 2023-03-08 RX ORDER — FLUTICASONE PROPIONATE AND SALMETEROL 500; 50 UG/1; UG/1
1 POWDER RESPIRATORY (INHALATION) 2 TIMES DAILY
Qty: 60 EACH | Refills: 11 | Status: SHIPPED | OUTPATIENT
Start: 2023-03-08 | End: 2024-03-07

## 2023-03-08 NOTE — TELEPHONE ENCOUNTER
----- Message from Danica Duron sent at 3/8/2023 11:20 AM CST -----  Contact: pt  Refill request. Pt is currently out and is requesting script be filled soon as possible     fluticasone-salmeterol diskus inhaler 500-50 mcg      [id number] - [place] - [address] [place] [address] & BABATUNDE SIMPSONNNEKA  [address] [place] [address]  [place] [address]  Phone: [phone] Fax: [phone]

## 2023-03-08 NOTE — TELEPHONE ENCOUNTER
I called Mr Britton to tell him his Advair RX will be reordered to [facility] today. Yessy Kent LPN

## 2023-03-30 ENCOUNTER — TELEPHONE (OUTPATIENT)
Dept: PULMONOLOGY | Facility: CLINIC | Age: 63
End: 2023-03-30
Payer: MEDICAID

## 2023-03-30 NOTE — TELEPHONE ENCOUNTER
Patient appointment has been scheduled with Dr nagy on 4/5/23 for 3:30pm. Patient verbalized that he understand and accepted the appointment.

## 2023-03-30 NOTE — TELEPHONE ENCOUNTER
----- Message from Patsy Williamson MA sent at 3/30/2023 10:50 AM CDT -----  Type:  Patient Returning Call    Who Called: pt  Does the patient know what this is regarding?: yes  Would the patient rather a call back or a response via [facility]? Call back  Best Call Back Number: [phone]  Additional Information: pt requesting a call back regarding provider leaving and needing new provider and prescriptions refilled

## 2023-04-18 ENCOUNTER — TELEPHONE (OUTPATIENT)
Dept: PULMONOLOGY | Facility: CLINIC | Age: 63
End: 2023-04-18
Payer: MEDICAID

## 2023-04-18 DIAGNOSIS — J45.909 ASTHMA, UNSPECIFIED ASTHMA SEVERITY, UNSPECIFIED WHETHER COMPLICATED, UNSPECIFIED WHETHER PERSISTENT: Primary | ICD-10-CM

## 2023-04-20 PROBLEM — J44.9 COPD (CHRONIC OBSTRUCTIVE PULMONARY DISEASE): Status: ACTIVE | Noted: 2019-06-12

## 2023-04-20 NOTE — PROGRESS NOTES
[facility]    SUBJECTIVE:     Chief Complaint: asthma/COPD follow up    History of Present Illness:  Nakul Britton is a 62 y.o. male who is a patient of Dr. Lundberg. He presents with a diagnosis of asthma. He used a prednisone taper when he feels ill.     Usually has 1-2 exacerbations per year, but during winter months during cold fronts or when in zak environments. Uses prednisone taper when he needs it.     Can swim the length of a pool easily without coming up for air. Can run 100 yards, stops because of knee pain.      Asthma in teenage years  Smoking: never  Other substances: never  Inhalers: Advair + albuterol uses daily  Incarceration/homelessness: denied  Recent illness: nil  B-Symptoms: nil      Review of patient's allergies indicates:   Allergen Reactions    Center-al house dust Shortness Of Breath    House dust mite Shortness Of Breath    [place] oil Rash       Past Medical History:   Diagnosis Date    Allergy     Asthma     Asthma 4/21/2023    Essential hypertension 6/17/2022    Hiatal hernia      Past Surgical History:   Procedure Laterality Date    CATARACT EXTRACTION       Family History   Problem Relation Age of Onset    No Known Problems Mother     No Known Problems Father      Social History     Socioeconomic History    Marital status: Single   Tobacco Use    Smoking status: Never    Smokeless tobacco: Never   Substance and Sexual Activity    Alcohol use: Yes     Alcohol/week: 0.0 standard drinks     Comment: 4-6 times per year, socially    Drug use: No    Sexual activity: Yes     Partners: Female   Social History Narrative    Works as a window washer       Review of Systems:  Review of Systems   Constitutional:  Negative for chills, diaphoresis, malaise/fatigue and weight loss.   Respiratory:  Negative for cough, hemoptysis, sputum production, shortness of breath and wheezing.    Cardiovascular:  Negative for chest pain, palpitations, orthopnea, claudication and leg swelling.  "  Gastrointestinal:  Negative for constipation, diarrhea, heartburn, nausea and vomiting.   Genitourinary:  Negative for dysuria, frequency and urgency.   Musculoskeletal:  Positive for joint pain. Negative for back pain, myalgias and neck pain.   Skin:  Negative for itching.   Neurological:  Negative for seizures and weakness.   Psychiatric/Behavioral:  Negative for depression, substance abuse and suicidal ideas.        OBJECTIVE:     Vital Signs  Vitals:    04/21/23 1525   BP: (!) 145/70   BP Location: Left arm   Patient Position: Sitting   Pulse: (!) 57   SpO2: 98%   Weight: 68.5 kg (151 lb 0.2 oz)   Height: 5' 4" (1.626 m)     Body mass index is 25.92 kg/m².    Physical Exam  Vitals and nursing note reviewed.   Constitutional:       General: He is not in acute distress.     Appearance: Normal appearance. He is not ill-appearing, toxic-appearing or diaphoretic.   HENT:      Head: Normocephalic and atraumatic.   Eyes:      General: No scleral icterus.     Conjunctiva/sclera: Conjunctivae normal.      Pupils: Pupils are equal, round, and reactive to light.   Cardiovascular:      Rate and Rhythm: Normal rate and regular rhythm.   Pulmonary:      Effort: Pulmonary effort is normal. No respiratory distress.      Breath sounds: Normal breath sounds. No stridor. No wheezing or rales.   Abdominal:      General: There is no distension.      Palpations: Abdomen is soft.      Tenderness: There is no abdominal tenderness.   Musculoskeletal:      Right lower leg: No edema.      Left lower leg: No edema.   Neurological:      General: No focal deficit present.      Mental Status: He is alert and oriented to person, place, and time.   Psychiatric:         Mood and Affect: Mood normal.         Behavior: Behavior normal.       Laboratory:  CBC  Lab Results   Component Value Date    WBC 6.71 03/18/2022    HGB 14.3 03/18/2022    HCT 46.9 03/18/2022     03/18/2022    MCV 94 03/18/2022    RDW 16.4 (H) 03/18/2022     BMP  Lab " Results   Component Value Date     06/24/2022    K 4.3 06/24/2022     06/24/2022    CO2 25 06/24/2022    BUN 16 06/24/2022    CREATININE 0.8 06/24/2022     06/24/2022    CALCIUM 9.7 06/24/2022     LFTs  Lab Results   Component Value Date    PROT 6.8 06/24/2022    ALBUMIN 4.0 06/24/2022    BILITOT 0.4 06/24/2022    AST 22 06/24/2022    ALKPHOS 71 06/24/2022    ALT 26 06/24/2022     PFTs 6/2021 and 4/21/2023- moderately severe obstruction without BD response with relative gas trapping.       Chest Imaging, My Impression:   CXR 2021 - NAD      ASSESSMENT/PLAN:     Problem Noted   Copd (Chronic Obstructive Pulmonary Disease) 6/12/2019   Asthma (Resolved) 4/21/2023   Mild Intermittent Asthma Without Complication (Resolved)     12/19/2019- Spirometry shows significant obstruction. Lung volume determination shows mild restriction is also present.Overall there is moderate ventilatory impairment. Spirometry remains unimproved following bronchodilator. DLCO is normal.         Problem List Items Addressed This Visit          Pulmonary    COPD (chronic obstructive pulmonary disease) - Primary    Current Assessment & Plan     May be fixed obstruction asthma vs COPD    - Continue adviar 1 puff b.i.d. (patient currently using only once daily)   -  Rinse mouth after use  -continue albuterol as needed for shortness of breath or wheezing  -continue Singulair as prescribed  - if recurrent exacerbations, would check IgE and discuss adding LAMA   - instructed to call if using prednisone taper more than once in next year    UTD on vaccines           Relevant Medications    predniSONE (DELTASONE) 10 MG tablet     RTC PRN.     CAITLIN Santana MD  [employer] [facility] Fellow

## 2023-04-20 NOTE — ASSESSMENT & PLAN NOTE
May be fixed obstruction asthma vs COPD    - Continue adviar 1 puff b.i.d. (patient currently using only once daily)   -  Rinse mouth after use  -continue albuterol as needed for shortness of breath or wheezing  -continue Singulair as prescribed  - if recurrent exacerbations, would check IgE and discuss adding LAMA   - instructed to call if using prednisone taper more than once in next year    UTD on vaccines

## 2023-04-21 ENCOUNTER — OFFICE VISIT (OUTPATIENT)
Dept: PULMONOLOGY | Facility: CLINIC | Age: 63
End: 2023-04-21
Payer: MEDICAID

## 2023-04-21 ENCOUNTER — HOSPITAL ENCOUNTER (OUTPATIENT)
Dept: PULMONOLOGY | Facility: CLINIC | Age: 63
Discharge: HOME | End: 2023-04-21
Payer: MEDICAID

## 2023-04-21 VITALS
BODY MASS INDEX: 25.78 KG/M2 | HEIGHT: 64 IN | OXYGEN SATURATION: 98 % | SYSTOLIC BLOOD PRESSURE: 145 MMHG | WEIGHT: 151 LBS | HEART RATE: 57 BPM | DIASTOLIC BLOOD PRESSURE: 70 MMHG

## 2023-04-21 DIAGNOSIS — J44.9 CHRONIC OBSTRUCTIVE PULMONARY DISEASE, UNSPECIFIED COPD TYPE: Primary | ICD-10-CM

## 2023-04-21 DIAGNOSIS — J45.909 ASTHMA, UNSPECIFIED ASTHMA SEVERITY, UNSPECIFIED WHETHER COMPLICATED, UNSPECIFIED WHETHER PERSISTENT: ICD-10-CM

## 2023-04-21 PROCEDURE — 4010F ACE/ARB THERAPY RXD/TAKEN: CPT | Mod: CPTII,,, | Performed by: INTERNAL MEDICINE

## 2023-04-21 PROCEDURE — 1159F MED LIST DOCD IN RCRD: CPT | Mod: CPTII,,, | Performed by: INTERNAL MEDICINE

## 2023-04-21 PROCEDURE — 94726 PLETHYSMOGRAPHY LUNG VOLUMES: CPT | Mod: 26,S$PBB,, | Performed by: INTERNAL MEDICINE

## 2023-04-21 PROCEDURE — 94729 DIFFUSING CAPACITY: CPT | Mod: 26,S$PBB,, | Performed by: INTERNAL MEDICINE

## 2023-04-21 PROCEDURE — 3078F PR MOST RECENT DIASTOLIC BLOOD PRESSURE < 80 MM HG: ICD-10-PCS | Mod: CPTII,,, | Performed by: INTERNAL MEDICINE

## 2023-04-21 PROCEDURE — 3008F BODY MASS INDEX DOCD: CPT | Mod: CPTII,,, | Performed by: INTERNAL MEDICINE

## 2023-04-21 PROCEDURE — 3078F DIAST BP <80 MM HG: CPT | Mod: CPTII,,, | Performed by: INTERNAL MEDICINE

## 2023-04-21 PROCEDURE — 3077F SYST BP >= 140 MM HG: CPT | Mod: CPTII,,, | Performed by: INTERNAL MEDICINE

## 2023-04-21 PROCEDURE — 99213 OFFICE O/P EST LOW 20 MIN: CPT | Mod: PBBFAC | Performed by: STUDENT IN AN ORGANIZED HEALTH CARE EDUCATION/TRAINING PROGRAM

## 2023-04-21 PROCEDURE — 99214 PR OFFICE/OUTPT VISIT, EST, LEVL IV, 30-39 MIN: ICD-10-PCS | Mod: 25,S$PBB,, | Performed by: INTERNAL MEDICINE

## 2023-04-21 PROCEDURE — 94726 PLETHYSMOGRAPHY LUNG VOLUMES: CPT | Mod: PBBFAC | Performed by: INTERNAL MEDICINE

## 2023-04-21 PROCEDURE — 94726 PULM FUNCT TST PLETHYSMOGRAP: ICD-10-PCS | Mod: 26,S$PBB,, | Performed by: INTERNAL MEDICINE

## 2023-04-21 PROCEDURE — 3008F PR BODY MASS INDEX (BMI) DOCUMENTED: ICD-10-PCS | Mod: CPTII,,, | Performed by: INTERNAL MEDICINE

## 2023-04-21 PROCEDURE — 94060 EVALUATION OF WHEEZING: CPT | Mod: 26,S$PBB,, | Performed by: INTERNAL MEDICINE

## 2023-04-21 PROCEDURE — 94729 DIFFUSING CAPACITY: CPT | Mod: PBBFAC | Performed by: INTERNAL MEDICINE

## 2023-04-21 PROCEDURE — 94060 PR EVAL OF BRONCHOSPASM: ICD-10-PCS | Mod: 26,S$PBB,, | Performed by: INTERNAL MEDICINE

## 2023-04-21 PROCEDURE — 4010F PR ACE/ARB THEARPY RXD/TAKEN: ICD-10-PCS | Mod: CPTII,,, | Performed by: INTERNAL MEDICINE

## 2023-04-21 PROCEDURE — 99214 OFFICE O/P EST MOD 30 MIN: CPT | Mod: 25,S$PBB,, | Performed by: INTERNAL MEDICINE

## 2023-04-21 PROCEDURE — 94729 PR C02/MEMBANE DIFFUSE CAPACITY: ICD-10-PCS | Mod: 26,S$PBB,, | Performed by: INTERNAL MEDICINE

## 2023-04-21 PROCEDURE — 1159F PR MEDICATION LIST DOCUMENTED IN MEDICAL RECORD: ICD-10-PCS | Mod: CPTII,,, | Performed by: INTERNAL MEDICINE

## 2023-04-21 PROCEDURE — 3077F PR MOST RECENT SYSTOLIC BLOOD PRESSURE >= 140 MM HG: ICD-10-PCS | Mod: CPTII,,, | Performed by: INTERNAL MEDICINE

## 2023-04-21 PROCEDURE — 94060 EVALUATION OF WHEEZING: CPT | Mod: PBBFAC | Performed by: INTERNAL MEDICINE

## 2023-04-21 RX ORDER — PREDNISONE 10 MG/1
TABLET ORAL
Qty: 30 TABLET | Refills: 4 | Status: SHIPPED | OUTPATIENT
Start: 2023-04-21 | End: 2023-05-06

## 2023-04-21 RX ORDER — PREDNISONE 10 MG/1
TABLET ORAL
Qty: 30 TABLET | Refills: 4 | Status: SHIPPED | OUTPATIENT
Start: 2023-04-21 | End: 2023-04-21

## 2023-04-23 LAB
DLCO SINGLE BREATH LLN: 16.38
DLCO SINGLE BREATH PRE REF: 77.9 %
DLCO SINGLE BREATH REF: 23.31
DLCOC SBVA LLN: 2.54
DLCOC SBVA REF: 3.95
DLCOC SINGLE BREATH LLN: 16.38
DLCOC SINGLE BREATH REF: 23.31
DLCOCSBVAULN: 5.35
DLCOCSINGLEBREATHULN: 30.24
DLCOSINGLEBREATHULN: 30.24
DLCOVA LLN: 2.54
DLCOVA PRE REF: 101 %
DLCOVA PRE: 3.98 ML/(MIN*MMHG*L) (ref 2.54–5.35)
DLCOVA REF: 3.95
DLCOVAULN: 5.35
ERV LLN: -16449.02
ERV PRE REF: 77.5 %
ERV REF: 0.98
ERVULN: ABNORMAL
FEF 25 75 LLN: 1.1
FEF 25 75 PRE REF: 31.7 %
FEF 25 75 REF: 2.35
FET100 CHG: 11.1 %
FEV05 LLN: 1.05
FEV05 REF: 2.18
FEV1 CHG: 1.2 %
FEV1 FVC LLN: 65
FEV1 FVC PRE REF: 72.8 %
FEV1 FVC REF: 78
FEV1 LLN: 2.08
FEV1 PRE REF: 57.7 %
FEV1 REF: 2.81
FEV1 VOL CHG: 0.02
FRCPLETH LLN: 2.3
FRCPLETH PREREF: 103.6 %
FRCPLETH REF: 3.29
FRCPLETHULN: 4.28
FVC CHG: 7.3 %
FVC LLN: 2.71
FVC PRE REF: 79.3 %
FVC REF: 3.6
FVC VOL CHG: 0.21
IVC PRE: 3 L (ref 2.71–4.5)
IVC SINGLE BREATH LLN: 2.71
IVC SINGLE BREATH PRE REF: 83.3 %
IVC SINGLE BREATH REF: 3.6
IVCSINGLEBREATHULN: 4.5
LLN IC: -9999997.51
PEF LLN: 5.68
PEF PRE REF: 47 %
PEF REF: 7.61
PHYSICIAN COMMENT: ABNORMAL
POST FEF 25 75: 0.8 L/S (ref 1.1–4.08)
POST FET 100: 6.86 SEC
POST FEV1 FVC: 53.46 % (ref 65.14–89.11)
POST FEV1: 1.64 L (ref 2.08–3.49)
POST FEV5: 1.08 L (ref 1.05–3.32)
POST FVC: 3.06 L (ref 2.71–4.5)
POST PEF: 3.75 L/S (ref 5.68–9.55)
PRE DLCO: 18.15 ML/(MIN*MMHG) (ref 16.38–30.24)
PRE ERV: 0.76 L (ref -16449.02–16450.98)
PRE FEF 25 75: 0.75 L/S (ref 1.1–4.08)
PRE FET 100: 6.18 SEC
PRE FEV05 REF: 50.6 %
PRE FEV1 FVC: 56.71 % (ref 65.14–89.11)
PRE FEV1: 1.62 L (ref 2.08–3.49)
PRE FEV5: 1.1 L (ref 1.05–3.32)
PRE FRC PL: 3.41 L (ref 2.3–4.28)
PRE FVC: 2.85 L (ref 2.71–4.5)
PRE IC: 2.24 L (ref -9999997.51–#######.####)
PRE PEF: 3.58 L/S (ref 5.68–9.55)
PRE REF IC: 89.8 %
PRE RV: 2.65 L (ref 1.63–2.98)
PRE TLC: 5.65 L (ref 4.76–7.06)
PRE VTG: 3.58 L
RAW PRE REF: 130 %
RAW PRE: 3.98 CMH2O*S/L (ref 3.06–3.06)
RAW REF: 3.06
REF IC: 2.49
RV LLN: 1.63
RV PRE REF: 114.8 %
RV REF: 2.31
RVTLC LLN: 30
RVTLC PRE REF: 120.5 %
RVTLC PRE: 46.89 % (ref 29.94–47.9)
RVTLC REF: 39
RVTLCULN: 48
RVULN: 2.98
SGAW PRE REF: 74.9 %
SGAW PRE: 0.06 1/(CMH2O*S) (ref 0.08–0.08)
SGAW REF: 0.08
TLC LLN: 4.76
TLC PRE REF: 95.6 %
TLC REF: 5.91
TLC ULN: 7.06
ULN IC: ABNORMAL
VA PRE: 4.56 L (ref 5.76–5.76)
VA SINGLE BREATH LLN: 5.76
VA SINGLE BREATH PRE REF: 79.1 %
VA SINGLE BREATH REF: 5.76
VASINGLEBREATHULN: 5.76
VC LLN: 2.71
VC PRE REF: 83.3 %
VC PRE: 3 L (ref 2.71–4.5)
VC REF: 3.6
VC ULN: 4.5

## 2023-05-13 DIAGNOSIS — J45.40 MODERATE PERSISTENT ASTHMA WITHOUT COMPLICATION: ICD-10-CM

## 2023-05-13 NOTE — TELEPHONE ENCOUNTER
No care due was identified.  [facility] Embedded Care Due Messages. Reference number: [id number].   5/13/2023 11:45:00 AM CDT

## 2023-05-14 RX ORDER — MONTELUKAST SODIUM 10 MG/1
TABLET ORAL
Qty: 90 TABLET | Refills: 1 | Status: SHIPPED | OUTPATIENT
Start: 2023-05-14 | End: 2023-06-27 | Stop reason: SDUPTHER

## 2023-05-14 NOTE — TELEPHONE ENCOUNTER
Refill Authorization Note     Refill Decision Note   Nakul Britton  is requesting a refill authorization.  Brief Assessment and Rationale for Refill:  Approve     Medication Therapy Plan:       Medication Reconciliation Completed: No   Comments:     No Care Gaps recommended.     Note composed:10:37 AM 05/14/2023

## 2023-06-07 DIAGNOSIS — I10 PRIMARY HYPERTENSION: ICD-10-CM

## 2023-06-07 RX ORDER — LOSARTAN POTASSIUM 25 MG/1
25 TABLET ORAL DAILY
Qty: 90 TABLET | Refills: 3 | Status: SHIPPED | OUTPATIENT
Start: 2023-06-07 | End: 2023-10-05 | Stop reason: SDUPTHER

## 2023-06-07 RX ORDER — LOSARTAN POTASSIUM 25 MG/1
TABLET ORAL
Qty: 90 TABLET | Refills: 3 | Status: SHIPPED | OUTPATIENT
Start: 2023-06-07 | End: 2023-06-07 | Stop reason: SDUPTHER

## 2023-06-08 DIAGNOSIS — N52.9 ERECTILE DYSFUNCTION, UNSPECIFIED ERECTILE DYSFUNCTION TYPE: ICD-10-CM

## 2023-06-08 NOTE — TELEPHONE ENCOUNTER
No care due was identified.  [facility] Embedded Care Due Messages. Reference number: [id number].   6/08/2023 1:23:38 PM CDT

## 2023-06-09 RX ORDER — SILDENAFIL 100 MG/1
TABLET, FILM COATED ORAL
Qty: 30 TABLET | Refills: 5 | Status: SHIPPED | OUTPATIENT
Start: 2023-06-09

## 2023-06-27 DIAGNOSIS — Z00.00 ANNUAL PHYSICAL EXAM: Primary | ICD-10-CM

## 2023-06-27 DIAGNOSIS — J45.40 MODERATE PERSISTENT ASTHMA WITHOUT COMPLICATION: ICD-10-CM

## 2023-06-27 RX ORDER — MONTELUKAST SODIUM 10 MG/1
10 TABLET ORAL NIGHTLY
Qty: 90 TABLET | Refills: 1 | Status: SHIPPED | OUTPATIENT
Start: 2023-06-27

## 2023-06-27 NOTE — TELEPHONE ENCOUNTER
Care Due:                  Date            Visit Type   Department     Provider  --------------------------------------------------------------------------------                                EP -                              PRIMARY      MET INTERNAL  Last Visit: 09-      CARE ([facility])   MEDICINE       Zaida Ornelas  Next Visit: None Scheduled  None         None Found                                                            Last  Test          Frequency    Reason                     Performed    Due Date  --------------------------------------------------------------------------------    Office Visit  12 months..  montelukast..............  09- 09-    [facility] Embedded Care Due Messages. Reference number: [id number].   6/27/2023 8:56:04 AM CDT

## 2023-07-06 DIAGNOSIS — R73.03 PRE-DIABETES: Primary | ICD-10-CM

## 2023-07-31 ENCOUNTER — TELEPHONE (OUTPATIENT)
Dept: VASCULAR SURGERY | Facility: CLINIC | Age: 63
End: 2023-07-31
Payer: MEDICAID

## 2023-07-31 DIAGNOSIS — I65.22 STENOSIS OF LEFT CAROTID ARTERY: Primary | ICD-10-CM

## 2023-07-31 NOTE — TELEPHONE ENCOUNTER
Contacted pt in response to message. Appts scheduled, pt confirmed. Appt letter placed in mail. ----- Message from Isak Freed sent at 7/31/2023 11:01 AM CDT -----  Regarding: Appt requested  Contact: [phone]  Hi, pt called to request an appt. Pt was last seen in 2022 and was told by provider he would need surgery. Pt is asking for the appt to start the process.Pls call the pt at [phone] to spk with the pt.

## 2023-08-14 ENCOUNTER — HOSPITAL ENCOUNTER (OUTPATIENT)
Dept: VASCULAR SURGERY | Facility: CLINIC | Age: 63
Discharge: HOME | End: 2023-08-14
Attending: SURGERY
Payer: MEDICAID

## 2023-08-14 DIAGNOSIS — I65.22 STENOSIS OF LEFT CAROTID ARTERY: ICD-10-CM

## 2023-08-14 DIAGNOSIS — I65.23 BILATERAL CAROTID ARTERY STENOSIS: Primary | ICD-10-CM

## 2023-08-14 PROCEDURE — 93880 EXTRACRANIAL BILAT STUDY: CPT | Mod: PBBFAC | Performed by: SURGERY

## 2023-08-14 PROCEDURE — 93880 EXTRACRANIAL BILAT STUDY: CPT | Mod: 26,S$PBB,, | Performed by: SURGERY

## 2023-08-14 PROCEDURE — 93880 PR DUPLEX SCAN EXTRACRANIAL,BILAT: ICD-10-PCS | Mod: 26,S$PBB,, | Performed by: SURGERY

## 2023-08-15 ENCOUNTER — HOSPITAL ENCOUNTER (OUTPATIENT)
Dept: CARDIOLOGY | Facility: CLINIC | Age: 63
Discharge: HOME | End: 2023-08-15
Payer: MEDICAID

## 2023-08-15 ENCOUNTER — HOSPITAL ENCOUNTER (OUTPATIENT)
Dept: RADIOLOGY | Facility: HOSPITAL | Age: 63
Discharge: HOME | End: 2023-08-15
Attending: SURGERY
Payer: MEDICAID

## 2023-08-15 ENCOUNTER — OFFICE VISIT (OUTPATIENT)
Dept: VASCULAR SURGERY | Facility: CLINIC | Age: 63
End: 2023-08-15
Attending: SURGERY
Payer: MEDICAID

## 2023-08-15 VITALS
HEIGHT: 64 IN | WEIGHT: 145.5 LBS | HEART RATE: 57 BPM | TEMPERATURE: 100 F | BODY MASS INDEX: 24.84 KG/M2 | DIASTOLIC BLOOD PRESSURE: 73 MMHG | SYSTOLIC BLOOD PRESSURE: 157 MMHG

## 2023-08-15 DIAGNOSIS — I65.23 BILATERAL CAROTID ARTERY STENOSIS: Primary | ICD-10-CM

## 2023-08-15 DIAGNOSIS — Z01.818 PRE-OP EVALUATION: ICD-10-CM

## 2023-08-15 DIAGNOSIS — Z98.890 HISTORY OF CEA (CAROTID ENDARTERECTOMY): ICD-10-CM

## 2023-08-15 DIAGNOSIS — I65.22 STENOSIS OF LEFT CAROTID ARTERY: Primary | ICD-10-CM

## 2023-08-15 PROCEDURE — 99215 PR OFFICE/OUTPT VISIT, EST, LEVL V, 40-54 MIN: ICD-10-PCS | Mod: S$PBB,,, | Performed by: SURGERY

## 2023-08-15 PROCEDURE — 4010F ACE/ARB THERAPY RXD/TAKEN: CPT | Mod: CPTII,,, | Performed by: SURGERY

## 2023-08-15 PROCEDURE — 1159F PR MEDICATION LIST DOCUMENTED IN MEDICAL RECORD: ICD-10-PCS | Mod: CPTII,,, | Performed by: SURGERY

## 2023-08-15 PROCEDURE — 3078F PR MOST RECENT DIASTOLIC BLOOD PRESSURE < 80 MM HG: ICD-10-PCS | Mod: CPTII,,, | Performed by: SURGERY

## 2023-08-15 PROCEDURE — 99999 PR PBB SHADOW E&M-EST. PATIENT-LVL III: ICD-10-PCS | Mod: PBBFAC,,, | Performed by: SURGERY

## 2023-08-15 PROCEDURE — 1160F PR REVIEW ALL MEDS BY PRESCRIBER/CLIN PHARMACIST DOCUMENTED: ICD-10-PCS | Mod: CPTII,,, | Performed by: SURGERY

## 2023-08-15 PROCEDURE — 71045 X-RAY EXAM CHEST 1 VIEW: CPT | Mod: 26,,, | Performed by: RADIOLOGY

## 2023-08-15 PROCEDURE — 3008F BODY MASS INDEX DOCD: CPT | Mod: CPTII,,, | Performed by: SURGERY

## 2023-08-15 PROCEDURE — 99999 PR PBB SHADOW E&M-EST. PATIENT-LVL III: CPT | Mod: PBBFAC,,, | Performed by: SURGERY

## 2023-08-15 PROCEDURE — 1159F MED LIST DOCD IN RCRD: CPT | Mod: CPTII,,, | Performed by: SURGERY

## 2023-08-15 PROCEDURE — 99215 OFFICE O/P EST HI 40 MIN: CPT | Mod: S$PBB,,, | Performed by: SURGERY

## 2023-08-15 PROCEDURE — 93010 EKG 12-LEAD: ICD-10-PCS | Mod: S$PBB,,, | Performed by: INTERNAL MEDICINE

## 2023-08-15 PROCEDURE — 99213 OFFICE O/P EST LOW 20 MIN: CPT | Mod: PBBFAC,25 | Performed by: SURGERY

## 2023-08-15 PROCEDURE — 3008F PR BODY MASS INDEX (BMI) DOCUMENTED: ICD-10-PCS | Mod: CPTII,,, | Performed by: SURGERY

## 2023-08-15 PROCEDURE — 93010 ELECTROCARDIOGRAM REPORT: CPT | Mod: S$PBB,,, | Performed by: INTERNAL MEDICINE

## 2023-08-15 PROCEDURE — 3078F DIAST BP <80 MM HG: CPT | Mod: CPTII,,, | Performed by: SURGERY

## 2023-08-15 PROCEDURE — 3077F PR MOST RECENT SYSTOLIC BLOOD PRESSURE >= 140 MM HG: ICD-10-PCS | Mod: CPTII,,, | Performed by: SURGERY

## 2023-08-15 PROCEDURE — 4010F PR ACE/ARB THEARPY RXD/TAKEN: ICD-10-PCS | Mod: CPTII,,, | Performed by: SURGERY

## 2023-08-15 PROCEDURE — 3077F SYST BP >= 140 MM HG: CPT | Mod: CPTII,,, | Performed by: SURGERY

## 2023-08-15 PROCEDURE — 1160F RVW MEDS BY RX/DR IN RCRD: CPT | Mod: CPTII,,, | Performed by: SURGERY

## 2023-08-15 PROCEDURE — 71045 X-RAY EXAM CHEST 1 VIEW: CPT | Mod: TC

## 2023-08-15 PROCEDURE — 93005 ELECTROCARDIOGRAM TRACING: CPT | Mod: PBBFAC | Performed by: INTERNAL MEDICINE

## 2023-08-15 PROCEDURE — 71045 XR CHEST 1 VIEW: ICD-10-PCS | Mod: 26,,, | Performed by: RADIOLOGY

## 2023-08-15 RX ORDER — SODIUM CHLORIDE 0.9 % (FLUSH) 0.9 %
10 SYRINGE (ML) INJECTION
Status: CANCELLED | OUTPATIENT
Start: 2023-08-15

## 2023-08-15 RX ORDER — CEFAZOLIN SODIUM 2 G/50ML
2 SOLUTION INTRAVENOUS ONCE
Status: CANCELLED | OUTPATIENT
Start: 2023-09-11

## 2023-08-15 NOTE — PROGRESS NOTES
VASCULAR SURGERY SERVICE    REFERRING DOCTOR: SHELLY Ornelas MD    CHIEF COMPLAINT:  Carotid stenosis    HISTORY OF PRESENT ILLNESS: Nakul Britton is a 62 y.o. male no prior history of stroke TIA or amaurosis, no history of coronary artery disease, with a recently discovered 70-80% left carotid stenosis prompted by finding of carotid bruit.  Prior to this he was on no statin, was recently prescribed Lipitor 40 mg daily.  Said he did not tolerate this dose but after cutting and half was able to take it.  He is not taking any aspirin currently, although also prescribed to him recently    He endorses a 40 year history of diffuse right-sided numbness she says has largely resolved since he began starting statin therapy.\    Reports that he can walk 5 stairs without difficulty.  He does not exercise on a regular basis but says it occasionally goes for job without any trouble.  He does have asthma and is on 2 inhalers.  States that he needs steroid tapers once or twice a year with asthma flares    He has no history of neck irradiation    6/7/2022:  This is a 3 week follow-up.  Continues to be neurologically asymptomatic.  As per my prior note I spent nearly an hour discussing treatment alternatives regarding his asymptomatic carotid disease.  He wishes to proceed medical therapy    9/13/2022:   He states he is currently taking only 20 mg of Lipitor every other day, as well as aspirin every other day.  States that the days he takes this low dose Lipitor that he feels lightheaded.  Denies interval stroke TIA or amaurosis    08/15/2023:  He has been almost a year since I last saw him.  He continues to be neurologically asymptomatic with no stroke TIA or amaurosis.  He had a 2nd opinion with Dr. Seble ARZOLA.  CTA report suggested 75-80% carotid stenosis.  Per the patient to palate had also recommended carotid endarterectomy.    He is taking aspirin 81 mg daily and Lipitor every other day.  He says if he takes it every day  makes him feel bad.  He is very active runs for exercise can easily walk up a flight of stairs without difficulty.  No history of coronary artery disease chest pain shortness of breath myocardial infarction    Past Medical History:   Diagnosis Date    Allergy     Asthma     Asthma 4/21/2023    Essential hypertension 6/17/2022    Hiatal hernia        Past Surgical History:   Procedure Laterality Date    CATARACT EXTRACTION           Current Outpatient Medications:     albuterol (PROVENTIL/VENTOLIN HFA) 90 mcg/actuation inhaler, INHALE 2 PUFFS BY MOUTH FOUR TIMES DAILY AS NEEDED, Disp: 54 g, Rfl: 1    aspirin (ECOTRIN) 81 MG EC tablet, Take 1 tablet (81 mg total) by mouth once daily., Disp: 90 tablet, Rfl: 3    fluticasone-salmeterol diskus inhaler 500-50 mcg, Inhale 1 puff into the lungs 2 (two) times daily. Controller, Disp: 60 each, Rfl: 11    losartan (COZAAR) 25 MG tablet, Take 1 tablet (25 mg total) by mouth once daily., Disp: 90 tablet, Rfl: 3    montelukast (SINGULAIR) 10 mg tablet, Take 1 tablet (10 mg total) by mouth every evening., Disp: 90 tablet, Rfl: 1    rosuvastatin (CRESTOR) 5 MG tablet, Take 1 tablet (5 mg total) by mouth once daily., Disp: 90 tablet, Rfl: 3    sildenafiL (VIAGRA) 100 MG tablet, TAKE ONE DAILY AS NEEDED FOR ERECTILE DYSFUNCTION, Disp: 30 tablet, Rfl: 5    Review of patient's allergies indicates:   Allergen Reactions    Center-al house dust Shortness Of Breath    House dust mite Shortness Of Breath    [place] oil Rash       Family History   Problem Relation Age of Onset    No Known Problems Mother     No Known Problems Father        Social History     Tobacco Use    Smoking status: Never    Smokeless tobacco: Never   Substance Use Topics    Alcohol use: Yes     Alcohol/week: 0.0 standard drinks of alcohol     Comment: 4-6 times per year, socially    Drug use: No     PHYSICAL EXAM:   BP (!) 157/73 (BP Location: Right arm, Patient Position: Sitting, BP Method: Medium (Automatic))    "Pulse (!) 57   Temp 99.7 °F (37.6 °C) (Oral)   Ht 5' 4" (1.626 m)   Wt 66 kg (145 lb 8.1 oz)   BMI 24.98 kg/m²   Constitutional:  Alert,   Well-appearing  In no distress.   Neurological: Normal speech  no focal findings  CN II - XII grossly intact.    Psychiatric: Mood and affect appropriate and symmetric.   HEENT: Normocephalic / atraumatic  PERRLA  Midline trachea  No scars across the neck   Cardiac: Regular rate and rhythm.   Pulmonary: Normal pulmonary effort.   Abdomen: Soft, not distended.     Skin: Warm and well perfused.    Vascular:  Radial pulses are 2+   Extremities/  Musculoskeletal: No edema.        IMAGING:  Duplex today shows no significant right carotid stenosis, and a left carotid stenosis proximally 80%, with peak velocity of 322(prior 350, and end-diastolic of 63(prior 107.     5/22 Carotid duplex (cardiology) shows no significant right carotid stenosis, and a 70-80% left carotid stenosis with a peak velocity of 274, end-diastolic velocity of 69, ratio of 4.15    IMPRESSION:  80-99 % left carotid stenosis, neurologically asymptomatic.      Given his challenges with taking statin medicine, and the progression of his carotid stenosis, I have recommended definitive therapy with carotid endarterectomy.  After significant discussion, he now wishes to proceed    PLAN:  1.  Left carotid endarterectomy 09/11/2023  2.  GETA  3. Cont ASA and statin until surgery    I have explained the risks, benefits and alternatives for this procedure in detail.  The patient voices understanding and all questions have be answered, and agrees to proceed with the procedure.       GROVER Cui III, MD, FACS  Professor and Chief, Vascular and Endovascular Surgery          "

## 2023-08-15 NOTE — H&P (VIEW-ONLY)
VASCULAR SURGERY SERVICE    REFERRING DOCTOR: SHELLY Ornelas MD    CHIEF COMPLAINT:  Carotid stenosis    HISTORY OF PRESENT ILLNESS: Nakul Britton is a 62 y.o. male no prior history of stroke TIA or amaurosis, no history of coronary artery disease, with a recently discovered 70-80% left carotid stenosis prompted by finding of carotid bruit.  Prior to this he was on no statin, was recently prescribed Lipitor 40 mg daily.  Said he did not tolerate this dose but after cutting and half was able to take it.  He is not taking any aspirin currently, although also prescribed to him recently    He endorses a 40 year history of diffuse right-sided numbness she says has largely resolved since he began starting statin therapy.\    Reports that he can walk 5 stairs without difficulty.  He does not exercise on a regular basis but says it occasionally goes for job without any trouble.  He does have asthma and is on 2 inhalers.  States that he needs steroid tapers once or twice a year with asthma flares    He has no history of neck irradiation    6/7/2022:  This is a 3 week follow-up.  Continues to be neurologically asymptomatic.  As per my prior note I spent nearly an hour discussing treatment alternatives regarding his asymptomatic carotid disease.  He wishes to proceed medical therapy    9/13/2022:   He states he is currently taking only 20 mg of Lipitor every other day, as well as aspirin every other day.  States that the days he takes this low dose Lipitor that he feels lightheaded.  Denies interval stroke TIA or amaurosis    08/15/2023:  He has been almost a year since I last saw him.  He continues to be neurologically asymptomatic with no stroke TIA or amaurosis.  He had a 2nd opinion with Dr. Seble ARZOLA.  CTA report suggested 75-80% carotid stenosis.  Per the patient to palate had also recommended carotid endarterectomy.    He is taking aspirin 81 mg daily and Lipitor every other day.  He says if he takes it every day  makes him feel bad.  He is very active runs for exercise can easily walk up a flight of stairs without difficulty.  No history of coronary artery disease chest pain shortness of breath myocardial infarction    Past Medical History:   Diagnosis Date    Allergy     Asthma     Asthma 4/21/2023    Essential hypertension 6/17/2022    Hiatal hernia        Past Surgical History:   Procedure Laterality Date    CATARACT EXTRACTION           Current Outpatient Medications:     albuterol (PROVENTIL/VENTOLIN HFA) 90 mcg/actuation inhaler, INHALE 2 PUFFS BY MOUTH FOUR TIMES DAILY AS NEEDED, Disp: 54 g, Rfl: 1    aspirin (ECOTRIN) 81 MG EC tablet, Take 1 tablet (81 mg total) by mouth once daily., Disp: 90 tablet, Rfl: 3    fluticasone-salmeterol diskus inhaler 500-50 mcg, Inhale 1 puff into the lungs 2 (two) times daily. Controller, Disp: 60 each, Rfl: 11    losartan (COZAAR) 25 MG tablet, Take 1 tablet (25 mg total) by mouth once daily., Disp: 90 tablet, Rfl: 3    montelukast (SINGULAIR) 10 mg tablet, Take 1 tablet (10 mg total) by mouth every evening., Disp: 90 tablet, Rfl: 1    rosuvastatin (CRESTOR) 5 MG tablet, Take 1 tablet (5 mg total) by mouth once daily., Disp: 90 tablet, Rfl: 3    sildenafiL (VIAGRA) 100 MG tablet, TAKE ONE DAILY AS NEEDED FOR ERECTILE DYSFUNCTION, Disp: 30 tablet, Rfl: 5    Review of patient's allergies indicates:   Allergen Reactions    Center-al house dust Shortness Of Breath    House dust mite Shortness Of Breath    Perry oil Rash       Family History   Problem Relation Age of Onset    No Known Problems Mother     No Known Problems Father        Social History     Tobacco Use    Smoking status: Never    Smokeless tobacco: Never   Substance Use Topics    Alcohol use: Yes     Alcohol/week: 0.0 standard drinks of alcohol     Comment: 4-6 times per year, socially    Drug use: No     PHYSICAL EXAM:   BP (!) 157/73 (BP Location: Right arm, Patient Position: Sitting, BP Method: Medium (Automatic))    "Pulse (!) 57   Temp 99.7 °F (37.6 °C) (Oral)   Ht 5' 4" (1.626 m)   Wt 66 kg (145 lb 8.1 oz)   BMI 24.98 kg/m²   Constitutional:  Alert,   Well-appearing  In no distress.   Neurological: Normal speech  no focal findings  CN II - XII grossly intact.    Psychiatric: Mood and affect appropriate and symmetric.   HEENT: Normocephalic / atraumatic  PERRLA  Midline trachea  No scars across the neck   Cardiac: Regular rate and rhythm.   Pulmonary: Normal pulmonary effort.   Abdomen: Soft, not distended.     Skin: Warm and well perfused.    Vascular:  Radial pulses are 2+   Extremities/  Musculoskeletal: No edema.        IMAGING:  Duplex today shows no significant right carotid stenosis, and a left carotid stenosis proximally 80%, with peak velocity of 322(prior 350, and end-diastolic of 63(prior 107.     5/22 Carotid duplex (cardiology) shows no significant right carotid stenosis, and a 70-80% left carotid stenosis with a peak velocity of 274, end-diastolic velocity of 69, ratio of 4.15    IMPRESSION:  80-99 % left carotid stenosis, neurologically asymptomatic.      Given his challenges with taking statin medicine, and the progression of his carotid stenosis, I have recommended definitive therapy with carotid endarterectomy.  After significant discussion, he now wishes to proceed    PLAN:  1.  Left carotid endarterectomy 09/11/2023  2.  GETA  3. Cont ASA and statin until surgery    I have explained the risks, benefits and alternatives for this procedure in detail.  The patient voices understanding and all questions have be answered, and agrees to proceed with the procedure.       GROVER Cui III, MD, FACS  Professor and Chief, Vascular and Endovascular Surgery          "

## 2023-08-22 ENCOUNTER — LAB VISIT (OUTPATIENT)
Dept: LAB | Facility: HOSPITAL | Age: 63
End: 2023-08-22
Attending: INTERNAL MEDICINE
Payer: MEDICAID

## 2023-08-22 DIAGNOSIS — J45.40 MODERATE PERSISTENT ASTHMA WITHOUT COMPLICATION: ICD-10-CM

## 2023-08-22 DIAGNOSIS — Z00.00 ANNUAL PHYSICAL EXAM: ICD-10-CM

## 2023-08-22 LAB
BILIRUB UR QL STRIP: NEGATIVE
CLARITY UR REFRACT.AUTO: CLEAR
COLOR UR AUTO: YELLOW
GLUCOSE UR QL STRIP: NEGATIVE
HGB UR QL STRIP: NEGATIVE
KETONES UR QL STRIP: NEGATIVE
LEUKOCYTE ESTERASE UR QL STRIP: NEGATIVE
NITRITE UR QL STRIP: NEGATIVE
PH UR STRIP: 7 [PH] (ref 5–8)
PROT UR QL STRIP: NEGATIVE
SP GR UR STRIP: 1.02 (ref 1–1.03)
URN SPEC COLLECT METH UR: NORMAL

## 2023-08-22 PROCEDURE — 81003 URINALYSIS AUTO W/O SCOPE: CPT | Performed by: INTERNAL MEDICINE

## 2023-08-29 ENCOUNTER — OFFICE VISIT (OUTPATIENT)
Dept: INTERNAL MEDICINE | Facility: CLINIC | Age: 63
End: 2023-08-29
Payer: MEDICAID

## 2023-08-29 VITALS
RESPIRATION RATE: 17 BRPM | SYSTOLIC BLOOD PRESSURE: 122 MMHG | WEIGHT: 141.56 LBS | HEIGHT: 64 IN | TEMPERATURE: 98 F | HEART RATE: 67 BPM | OXYGEN SATURATION: 97 % | DIASTOLIC BLOOD PRESSURE: 71 MMHG | BODY MASS INDEX: 24.17 KG/M2

## 2023-08-29 DIAGNOSIS — I65.22 CAROTID STENOSIS, LEFT: ICD-10-CM

## 2023-08-29 DIAGNOSIS — Z00.00 ANNUAL PHYSICAL EXAM: Primary | ICD-10-CM

## 2023-08-29 DIAGNOSIS — I10 ESSENTIAL HYPERTENSION: ICD-10-CM

## 2023-08-29 DIAGNOSIS — E78.2 MIXED HYPERLIPIDEMIA: ICD-10-CM

## 2023-08-29 DIAGNOSIS — J45.40 MODERATE PERSISTENT ASTHMA WITHOUT COMPLICATION: ICD-10-CM

## 2023-08-29 PROCEDURE — 3074F SYST BP LT 130 MM HG: CPT | Mod: CPTII,,, | Performed by: INTERNAL MEDICINE

## 2023-08-29 PROCEDURE — 3008F BODY MASS INDEX DOCD: CPT | Mod: CPTII,,, | Performed by: INTERNAL MEDICINE

## 2023-08-29 PROCEDURE — 99999 PR PBB SHADOW E&M-EST. PATIENT-LVL III: CPT | Mod: PBBFAC,,, | Performed by: INTERNAL MEDICINE

## 2023-08-29 PROCEDURE — 3078F PR MOST RECENT DIASTOLIC BLOOD PRESSURE < 80 MM HG: ICD-10-PCS | Mod: CPTII,,, | Performed by: INTERNAL MEDICINE

## 2023-08-29 PROCEDURE — 3074F PR MOST RECENT SYSTOLIC BLOOD PRESSURE < 130 MM HG: ICD-10-PCS | Mod: CPTII,,, | Performed by: INTERNAL MEDICINE

## 2023-08-29 PROCEDURE — 4010F ACE/ARB THERAPY RXD/TAKEN: CPT | Mod: CPTII,,, | Performed by: INTERNAL MEDICINE

## 2023-08-29 PROCEDURE — 99999 PR PBB SHADOW E&M-EST. PATIENT-LVL III: ICD-10-PCS | Mod: PBBFAC,,, | Performed by: INTERNAL MEDICINE

## 2023-08-29 PROCEDURE — 4010F PR ACE/ARB THEARPY RXD/TAKEN: ICD-10-PCS | Mod: CPTII,,, | Performed by: INTERNAL MEDICINE

## 2023-08-29 PROCEDURE — 3078F DIAST BP <80 MM HG: CPT | Mod: CPTII,,, | Performed by: INTERNAL MEDICINE

## 2023-08-29 PROCEDURE — 99214 PR OFFICE/OUTPT VISIT, EST, LEVL IV, 30-39 MIN: ICD-10-PCS | Mod: S$PBB,,, | Performed by: INTERNAL MEDICINE

## 2023-08-29 PROCEDURE — 3044F PR MOST RECENT HEMOGLOBIN A1C LEVEL <7.0%: ICD-10-PCS | Mod: CPTII,,, | Performed by: INTERNAL MEDICINE

## 2023-08-29 PROCEDURE — 99213 OFFICE O/P EST LOW 20 MIN: CPT | Mod: PBBFAC,PO | Performed by: INTERNAL MEDICINE

## 2023-08-29 PROCEDURE — 3008F PR BODY MASS INDEX (BMI) DOCUMENTED: ICD-10-PCS | Mod: CPTII,,, | Performed by: INTERNAL MEDICINE

## 2023-08-29 PROCEDURE — 99214 OFFICE O/P EST MOD 30 MIN: CPT | Mod: S$PBB,,, | Performed by: INTERNAL MEDICINE

## 2023-08-29 PROCEDURE — 3044F HG A1C LEVEL LT 7.0%: CPT | Mod: CPTII,,, | Performed by: INTERNAL MEDICINE

## 2023-08-29 NOTE — PROGRESS NOTES
Subjective:     Nakul Britton is a 62 y.o. male who presents for an annual exam.    PCP: Zaida Ornelas MD    Hypertension: The patient has been taking medications as instructed, no medication side effects noted, no chest pain on exertion, no dyspnea on exertion, and no swelling of ankles.    Asthma: Patient presents for follow-up asthma. Symptoms currently include dyspnea and wheezing and occur more than once daily. Observed precipitants include: no identifiable factor. Current limitations in activity from asthma: none.    Hyperlipidemia: Compliance with treatment has been inadequate. He may have stopped Crestor when it ran out. The patient exercises rarely. Patient denies muscle pain associated with his medications. Previous history of cardiac disease includes:  none .    Lab Results   Component Value Date    CHOL 162 08/22/2023    HDL 39 (L) 08/22/2023        Planning L CEA on 9/11/23    Medical History:   Past Medical History:   Diagnosis Date    Allergy     Asthma     Asthma 4/21/2023    Essential hypertension 6/17/2022    Hiatal hernia      Family History: family history includes No Known Problems in his father and mother.    Surgical History:   Past Surgical History:   Procedure Laterality Date    CAROTID ENDARTERECTOMY Left 9/11/2023    Procedure: ENDARTERECTOMY-CAROTID;  Surgeon: MAISHA Cui III, MD;  Location: [facility] OR [address] [place] [address];  Service: Vascular;  Laterality: Left;    CATARACT EXTRACTION        Social History:  reports that he has never smoked. He has never used smokeless tobacco. He reports current alcohol use. He reports that he does not use drugs.     Allergies:   Review of patient's allergies indicates:   Allergen Reactions    Center-al house dust Shortness Of Breath    House dust mite Shortness Of Breath    Olive oil Rash     Medications:   Current Outpatient Medications:     albuterol (PROVENTIL/VENTOLIN HFA) 90 mcg/actuation inhaler, INHALE 2 PUFFS BY MOUTH FOUR TIMES DAILY AS  NEEDED, Disp: 54 g, Rfl: 1    aspirin (ECOTRIN) 81 MG EC tablet, Take 1 tablet (81 mg total) by mouth once daily., Disp: 90 tablet, Rfl: 3    fluticasone-salmeterol diskus inhaler 500-50 mcg, Inhale 1 puff into the lungs 2 (two) times daily. Controller, Disp: 60 each, Rfl: 11    losartan (COZAAR) 25 MG tablet, Take 1 tablet (25 mg total) by mouth once daily., Disp: 90 tablet, Rfl: 3    montelukast (SINGULAIR) 10 mg tablet, Take 1 tablet (10 mg total) by mouth every evening. (Patient taking differently: Take 10 mg by mouth once daily.), Disp: 90 tablet, Rfl: 1    rosuvastatin (CRESTOR) 5 MG tablet, Take 1 tablet (5 mg total) by mouth once daily., Disp: 90 tablet, Rfl: 3    sildenafiL (VIAGRA) 100 MG tablet, TAKE ONE DAILY AS NEEDED FOR ERECTILE DYSFUNCTION, Disp: 30 tablet, Rfl: 5  No current facility-administered medications for this visit.    Facility-Administered Medications Ordered in Other Visits:     sodium chloride 0.9% flush 10 mL, 10 mL, Intravenous, PRN, William Ramos MD    Health Maintenance:   Health Maintenance Topics with due status: Not Due       Topic Last Completion Date    TETANUS VACCINE 2015    Colorectal Cancer Screening 2022    Hemoglobin A1c (Prediabetes) 2023    Lipid Panel 2023     Lab Results   Component Value Date    HEPCAB Negative 05/10/2017     Eye Exam: within the last year  Dental Exam: due  Cologuard: 22, neg  Hepatitis C screenin2017, neg    Vaccinations:   Immunization History   Administered Date(s) Administered    COVID-19 MRNA, LN-S PF (MODERNA HALF 0.25 ML DOSE) 2021    COVID-19, MRNA, LN-S, PF (MODERNA FULL 0.5 ML DOSE) 2021, 2021, 2022    COVID-19, MRNA, LN-S, PF (Pfizer) (Purple Cap) 10/19/2022    Hepatitis B (recombinant) Adjuvanted, 2 dose 2018    Hepatitis B, Adult 2017, 2018    Influenza 2020    Influenza - Quadrivalent 2019    Influenza - Quadrivalent - MDCK - PF 2020, 2022     Influenza - Quadrivalent - PF *Preferred* (6 months and older) 08/10/2017, 10/19/2018, 10/04/2021    Influenza - Trivalent (ADULT) 08/27/2010    Influenza - Trivalent - PF (ADULT) 09/06/2011, 08/20/2012, 08/15/2013, 08/12/2015, 08/11/2016    Influenza A (H1N1) 2009 Monovalent - IM 12/17/2009    Pneumococcal Polysaccharide - 23 Valent 05/12/2017    Td (ADULT) 09/29/2005    Tdap 09/04/2015       Tetanus: 2015  Shingrix: due, declined  Pneumovax: 2017  Prevnar-20: due  Covid vaccine: booster due      Body mass index is 24.29 kg/m².  Wt Readings from Last 3 Encounters:   10/10/23 65 kg (143 lb 4.8 oz)   09/11/23 63.5 kg (139 lb 15.9 oz)   08/29/23 64.2 kg (141 lb 8.6 oz)       Review of Systems   Constitutional:  Negative for chills, diaphoresis, fatigue and fever.   HENT:  Negative for congestion, dental problem, ear discharge, ear pain, postnasal drip, rhinorrhea, sinus pressure, sore throat and trouble swallowing.    Eyes:  Positive for discharge and itching. Negative for redness and visual disturbance.        Watery discharge   Respiratory:  Negative for cough, chest tightness and shortness of breath.    Cardiovascular:  Negative for chest pain, palpitations and leg swelling.   Gastrointestinal:  Negative for abdominal pain, blood in stool, constipation, diarrhea, nausea and vomiting.   Endocrine: Negative for polydipsia and polyuria.   Genitourinary:  Negative for decreased urine volume, dysuria, frequency and hematuria.   Musculoskeletal:  Negative for arthralgias, back pain and myalgias.   Skin:  Negative for rash and wound.   Neurological:  Negative for dizziness, weakness, numbness and headaches.   Hematological:  Negative for adenopathy.   Psychiatric/Behavioral:  Negative for dysphoric mood and sleep disturbance. The patient is not nervous/anxious.         Mildly anxious about upcoming surgery          Objective:     Physical Exam  Vitals reviewed.   Constitutional:       General: He is awake. He is not in  acute distress.     Appearance: Normal appearance. He is well-developed and well-groomed. He is not ill-appearing.   HENT:      Head: Normocephalic and atraumatic.      Right Ear: Hearing, tympanic membrane, ear canal and external ear normal. Tympanic membrane is not erythematous or bulging.      Left Ear: Hearing, tympanic membrane, ear canal and external ear normal. Tympanic membrane is not erythematous or bulging.      Nose: Nose normal. No congestion.      Mouth/Throat:      Mouth: Mucous membranes are moist.      Tongue: No lesions.      Pharynx: Oropharynx is clear. Uvula midline. No oropharyngeal exudate or posterior oropharyngeal erythema.   Eyes:      General: Lids are normal. Vision grossly intact. Gaze aligned appropriately. No scleral icterus.     Conjunctiva/sclera: Conjunctivae normal.      Right eye: Right conjunctiva is not injected.      Left eye: Left conjunctiva is not injected.      Pupils: Pupils are equal, round, and reactive to light.   Neck:      Thyroid: No thyroid mass or thyromegaly.   Cardiovascular:      Rate and Rhythm: Normal rate and regular rhythm.      Pulses: Normal pulses.      Heart sounds: Normal heart sounds. No murmur heard.  Pulmonary:      Effort: Pulmonary effort is normal. No respiratory distress.      Breath sounds: Normal breath sounds. No decreased breath sounds or wheezing.   Abdominal:      General: Bowel sounds are normal. There is no distension.      Palpations: Abdomen is soft.      Tenderness: There is no abdominal tenderness. There is no guarding or rebound.   Musculoskeletal:         General: Normal range of motion.      Cervical back: Normal range of motion and neck supple.      Right lower leg: No edema.      Left lower leg: No edema.   Lymphadenopathy:      Cervical: No cervical adenopathy.      Upper Body:      Right upper body: No supraclavicular adenopathy.      Left upper body: No supraclavicular adenopathy.   Skin:     General: Skin is warm and dry.       Coloration: Skin is not cyanotic.      Findings: No lesion or rash.      Nails: There is no clubbing.   Neurological:      General: No focal deficit present.      Mental Status: He is alert and oriented to person, place, and time.      Coordination: Coordination is intact.      Gait: Gait is intact.      Deep Tendon Reflexes: Reflexes are normal and symmetric. Reflexes normal.   Psychiatric:         Attention and Perception: Attention normal.         Mood and Affect: Mood normal.         Behavior: Behavior is cooperative.            Assessment:        1. Annual physical exam    2. Essential hypertension    3. Moderate persistent asthma without complication    4. Carotid stenosis, left    5. Mixed hyperlipidemia           Plan:     1. Annual physical exam  - reviewed recent labs with patient    2. Essential hypertension  - controlled, continue     3. Moderate persistent asthma without complication  - stable, on Breo and PRN albuterol, continue to monitor    4. Carotid stenosis, left  - scheduled for L CEA    5. Mixed hyperlipidemia  - should resume Crestor due to atherosclerosis, will monitor  - rosuvastatin (CRESTOR) 5 MG tablet; Take 1 tablet (5 mg total) by mouth once daily.  Dispense: 90 tablet; Refill: 3    RTC in 6 months for follow-up or sooner if needed    __________________________    Zaida Ornelas MD, PharmD  [facility]  American Board of Obesity Medicine diplomate  Office [phone]

## 2023-09-08 ENCOUNTER — ANESTHESIA EVENT (OUTPATIENT)
Dept: SURGERY | Facility: HOSPITAL | Age: 63
DRG: 039 | End: 2023-09-08
Payer: MEDICAID

## 2023-09-08 ENCOUNTER — TELEPHONE (OUTPATIENT)
Dept: VASCULAR SURGERY | Facility: CLINIC | Age: 63
End: 2023-09-08
Payer: MEDICAID

## 2023-09-08 NOTE — ANESTHESIA PREPROCEDURE EVALUATION
[facility]  Anesthesia Pre-Operative Evaluation         Patient Name: Nakul Britton  YOB: 1960  MRN: [id number]    SUBJECTIVE:     Pre-operative evaluation for Procedure(s) (LRB):  ENDARTERECTOMY-CAROTID (Left)     09/08/2023    Nakul Britton is a 62 y.o. male w/ a significant PMHx of HTN and asthma. Found to have 70-80% L ICA stenosis.    Patient now presents for the above procedure(s).    NPO since yesterday    Did not take losartan this AM    Has not used asthma inhalers since last winter    Prev airway: None documented.    Patient Active Problem List   Diagnosis    Erectile dysfunction    Hyperlipidemia    Pre-diabetes    COPD (chronic obstructive pulmonary disease)    Change in mole    Gastroesophageal reflux disease    Stenosis of left carotid artery    Essential hypertension    Bilateral carotid artery stenosis       Review of patient's allergies indicates:   Allergen Reactions    Center-al house dust Shortness Of Breath    House dust mite Shortness Of Breath    Olive oil Rash       Current Inpatient Medications:      No current facility-administered medications on file prior to encounter.     Current Outpatient Medications on File Prior to Encounter   Medication Sig Dispense Refill    albuterol (PROVENTIL/VENTOLIN HFA) 90 mcg/actuation inhaler INHALE 2 PUFFS BY MOUTH FOUR TIMES DAILY AS NEEDED 54 g 1    aspirin (ECOTRIN) 81 MG EC tablet Take 1 tablet (81 mg total) by mouth once daily. 90 tablet 3    fluticasone-salmeterol diskus inhaler 500-50 mcg Inhale 1 puff into the lungs 2 (two) times daily. Controller 60 each 11    losartan (COZAAR) 25 MG tablet Take 1 tablet (25 mg total) by mouth once daily. 90 tablet 3    montelukast (SINGULAIR) 10 mg tablet Take 1 tablet (10 mg total) by mouth every evening. 90 tablet 1    rosuvastatin (CRESTOR)  5 MG tablet Take 1 tablet (5 mg total) by mouth once daily. 90 tablet 3    sildenafiL (VIAGRA) 100 MG tablet TAKE ONE DAILY AS NEEDED FOR ERECTILE DYSFUNCTION 30 tablet 5       Past Surgical History:   Procedure Laterality Date    CATARACT EXTRACTION         Social History     Socioeconomic History    Marital status: Single   Tobacco Use    Smoking status: Never    Smokeless tobacco: Never   Substance and Sexual Activity    Alcohol use: Yes     Alcohol/week: 0.0 standard drinks of alcohol     Comment: 4-6 times per year, socially    Drug use: No    Sexual activity: Yes     Partners: Female   Social History Narrative    Works as a window washer       OBJECTIVE:     Vital Signs Range (Last 24H):         Significant Labs:  Lab Results   Component Value Date    WBC 5.66 08/22/2023    HGB 14.3 08/22/2023    HCT 44.4 08/22/2023     08/22/2023    CHOL 162 08/22/2023    TRIG 131 08/22/2023    HDL 39 (L) 08/22/2023    ALT 21 08/22/2023    AST 21 08/22/2023     08/22/2023    K 4.0 08/22/2023     08/22/2023    CREATININE 0.8 08/22/2023    BUN 12 08/22/2023    CO2 23 08/22/2023    TSH 1.661 08/22/2023    PSA 1.6 03/18/2022    INR 1.1 02/26/2008    HGBA1C 5.6 08/22/2023       Diagnostic Studies: No relevant studies.    EKG:   Results for orders placed or performed during the hospital encounter of 08/15/23   EKG 12-lead    Collection Time: 08/15/23 11:25 AM    Narrative    Test Reason : Z01.818,    Vent. Rate : 058 BPM     Atrial Rate : 058 BPM     P-R Int : 160 ms          QRS Dur : 102 ms      QT Int : 420 ms       P-R-T Axes : 071 065 065 degrees     QTc Int : 412 ms    Sinus bradycardia  Otherwise normal ECG  When compared with ECG of 14-JUN-2022 15:15,  No significant change was found  Confirmed by Jorge GARCIA, Bryon CHEN ([id number]) on 8/15/2023 4:13:57 PM    Referred By: MAISHA ADAMS           Confirmed By:Bryon Patel MD       2D ECHO:  TTE:  No results found for this or any previous visit.    MYLES:  No  results found for this or any previous visit.    ASSESSMENT/PLAN:           Pre-op Assessment    I have reviewed the Patient Summary Reports.     I have reviewed the Nursing Notes. I have reviewed the NPO Status.   I have reviewed the Medications.     Review of Systems  Anesthesia Hx:  No problems with previous Anesthesia  History of prior surgery of interest to airway management or planning: Denies Family Hx of Anesthesia complications.   Denies Personal Hx of Anesthesia complications.   Social:  Non-Smoker, No Alcohol Use    Hematology/Oncology:  Hematology Normal   Oncology Normal     EENT/Dental:EENT/Dental Normal   Cardiovascular:   Exercise tolerance: good Hypertension Denies CAD.    Denies Dysrhythmias.     Pulmonary:   Denies COPD. Asthma  Denies Sleep Apnea.    Hepatic/GI:   Denies GERD.    Neurological:   Denies Neuromuscular Disease.    Endocrine:   Denies Diabetes.    Psych:   Denies Psychiatric History.          Physical Exam  General: Well nourished, Cooperative, Alert and Oriented    Airway:  Mallampati: II   Mouth Opening: Normal  TM Distance: Normal  Tongue: Normal  Neck ROM: Normal ROM    Dental:  Intact    Chest/Lungs:  Clear to auscultation, Normal Respiratory Rate    Heart:  Rate: Normal  Rhythm: Regular Rhythm  Sounds: Normal    Abdomen:  Nontender, Soft, Normal        Anesthesia Plan  Type of Anesthesia, risks & benefits discussed:    Anesthesia Type: Gen ETT  Intra-op Monitoring Plan: Standard ASA Monitors and Art Line  Post Op Pain Control Plan: multimodal analgesia  Induction:  IV  Airway Plan: Direct, Post-Induction  Informed Consent: Informed consent signed with the Patient and all parties understand the risks and agree with anesthesia plan.  All questions answered.   ASA Score: 3  Day of Surgery Review of History & Physical: H&P Update referred to the surgeon/provider.    Ready For Surgery From Anesthesia Perspective.     .

## 2023-09-08 NOTE — TELEPHONE ENCOUNTER
----- Message from Chiquis Bermudez sent at 9/8/2023 10:17 AM CDT -----  Regarding: Instructions  Contact: Pt @ [phone]  Pt is calling to get nurse to call back to discuss surgery instructions. Asking for a call back

## 2023-09-11 ENCOUNTER — ANESTHESIA (OUTPATIENT)
Dept: SURGERY | Facility: HOSPITAL | Age: 63
DRG: 039 | End: 2023-09-11
Payer: MEDICAID

## 2023-09-11 ENCOUNTER — HOSPITAL ENCOUNTER (INPATIENT)
Facility: HOSPITAL | Age: 63
LOS: 1 days | Discharge: HOME | DRG: 039 | End: 2023-09-12
Attending: SURGERY | Admitting: SURGERY
Payer: MEDICAID

## 2023-09-11 DIAGNOSIS — Z98.890 HX OF ENDARTERECTOMY: ICD-10-CM

## 2023-09-11 DIAGNOSIS — I65.23 BILATERAL CAROTID ARTERY STENOSIS: Primary | ICD-10-CM

## 2023-09-11 LAB
ANION GAP SERPL CALC-SCNC: 7 MMOL/L (ref 8–16)
BASOPHILS # BLD AUTO: 0.03 K/UL (ref 0–0.2)
BASOPHILS NFR BLD: 0.3 % (ref 0–1.9)
BUN SERPL-MCNC: 13 MG/DL (ref 8–23)
CALCIUM SERPL-MCNC: 8 MG/DL (ref 8.7–10.5)
CHLORIDE SERPL-SCNC: 113 MMOL/L (ref 95–110)
CO2 SERPL-SCNC: 21 MMOL/L (ref 23–29)
CREAT SERPL-MCNC: 0.7 MG/DL (ref 0.5–1.4)
DIFFERENTIAL METHOD: ABNORMAL
EOSINOPHIL # BLD AUTO: 0 K/UL (ref 0–0.5)
EOSINOPHIL NFR BLD: 0.1 % (ref 0–8)
ERYTHROCYTE [DISTWIDTH] IN BLOOD BY AUTOMATED COUNT: 15 % (ref 11.5–14.5)
EST. GFR  (NO RACE VARIABLE): >60 ML/MIN/1.73 M^2
GLUCOSE SERPL-MCNC: 135 MG/DL (ref 70–110)
HCT VFR BLD AUTO: 35.9 % (ref 40–54)
HGB BLD-MCNC: 12 G/DL (ref 14–18)
IMM GRANULOCYTES # BLD AUTO: 0.04 K/UL (ref 0–0.04)
IMM GRANULOCYTES NFR BLD AUTO: 0.4 % (ref 0–0.5)
LYMPHOCYTES # BLD AUTO: 0.6 K/UL (ref 1–4.8)
LYMPHOCYTES NFR BLD: 5.1 % (ref 18–48)
MCH RBC QN AUTO: 28.4 PG (ref 27–31)
MCHC RBC AUTO-ENTMCNC: 33.4 G/DL (ref 32–36)
MCV RBC AUTO: 85 FL (ref 82–98)
MONOCYTES # BLD AUTO: 0.3 K/UL (ref 0.3–1)
MONOCYTES NFR BLD: 2.6 % (ref 4–15)
NEUTROPHILS # BLD AUTO: 9.9 K/UL (ref 1.8–7.7)
NEUTROPHILS NFR BLD: 91.5 % (ref 38–73)
NRBC BLD-RTO: 0 /100 WBC
PLATELET # BLD AUTO: 186 K/UL (ref 150–450)
PMV BLD AUTO: 9.5 FL (ref 9.2–12.9)
POC ACTIVATED CLOTTING TIME K: 143 SEC (ref 74–137)
POC ACTIVATED CLOTTING TIME K: 239 SEC (ref 74–137)
POC ACTIVATED CLOTTING TIME K: 269 SEC (ref 74–137)
POTASSIUM SERPL-SCNC: 4.2 MMOL/L (ref 3.5–5.1)
RBC # BLD AUTO: 4.22 M/UL (ref 4.6–6.2)
SAMPLE: ABNORMAL
SODIUM SERPL-SCNC: 141 MMOL/L (ref 136–145)
WBC # BLD AUTO: 10.81 K/UL (ref 3.9–12.7)

## 2023-09-11 PROCEDURE — 71000016 HC POSTOP RECOV ADDL HR: Performed by: SURGERY

## 2023-09-11 PROCEDURE — C1768 GRAFT, VASCULAR: HCPCS | Performed by: SURGERY

## 2023-09-11 PROCEDURE — 80048 BASIC METABOLIC PNL TOTAL CA: CPT | Performed by: STUDENT IN AN ORGANIZED HEALTH CARE EDUCATION/TRAINING PROGRAM

## 2023-09-11 PROCEDURE — 37000008 HC ANESTHESIA 1ST 15 MINUTES: Performed by: SURGERY

## 2023-09-11 PROCEDURE — 36620 INSERTION CATHETER ARTERY: CPT | Mod: 59,,, | Performed by: ANESTHESIOLOGY

## 2023-09-11 PROCEDURE — 37000009 HC ANESTHESIA EA ADD 15 MINS: Performed by: SURGERY

## 2023-09-11 PROCEDURE — 25000003 PHARM REV CODE 250: Performed by: STUDENT IN AN ORGANIZED HEALTH CARE EDUCATION/TRAINING PROGRAM

## 2023-09-11 PROCEDURE — 63600175 PHARM REV CODE 636 W HCPCS: Performed by: SURGERY

## 2023-09-11 PROCEDURE — 63600175 PHARM REV CODE 636 W HCPCS: Performed by: STUDENT IN AN ORGANIZED HEALTH CARE EDUCATION/TRAINING PROGRAM

## 2023-09-11 PROCEDURE — D9220A PRA ANESTHESIA: ICD-10-PCS | Mod: ,,, | Performed by: ANESTHESIOLOGY

## 2023-09-11 PROCEDURE — 71000015 HC POSTOP RECOV 1ST HR: Performed by: SURGERY

## 2023-09-11 PROCEDURE — 20600001 HC STEP DOWN PRIVATE ROOM

## 2023-09-11 PROCEDURE — 36000706: Performed by: SURGERY

## 2023-09-11 PROCEDURE — 99900035 HC TECH TIME PER 15 MIN (STAT)

## 2023-09-11 PROCEDURE — 71000039 HC RECOVERY, EACH ADD'L HOUR: Performed by: SURGERY

## 2023-09-11 PROCEDURE — 35301 RECHANNELING OF ARTERY: CPT | Mod: LT,,, | Performed by: SURGERY

## 2023-09-11 PROCEDURE — 85025 COMPLETE CBC W/AUTO DIFF WBC: CPT | Performed by: STUDENT IN AN ORGANIZED HEALTH CARE EDUCATION/TRAINING PROGRAM

## 2023-09-11 PROCEDURE — D9220A PRA ANESTHESIA: Mod: ,,, | Performed by: ANESTHESIOLOGY

## 2023-09-11 PROCEDURE — 27201423 OPTIME MED/SURG SUP & DEVICES STERILE SUPPLY: Performed by: SURGERY

## 2023-09-11 PROCEDURE — 94761 N-INVAS EAR/PLS OXIMETRY MLT: CPT

## 2023-09-11 PROCEDURE — 36000707: Performed by: SURGERY

## 2023-09-11 PROCEDURE — 35301 PR THROMBOENDARTECTMY NECK,NECK INCIS: ICD-10-PCS | Mod: LT,,, | Performed by: SURGERY

## 2023-09-11 PROCEDURE — 71000033 HC RECOVERY, INTIAL HOUR: Performed by: SURGERY

## 2023-09-11 PROCEDURE — 63600175 PHARM REV CODE 636 W HCPCS

## 2023-09-11 PROCEDURE — 27000221 HC OXYGEN, UP TO 24 HOURS

## 2023-09-11 PROCEDURE — 36620 ARTERIAL: ICD-10-PCS | Mod: 59,,, | Performed by: ANESTHESIOLOGY

## 2023-09-11 DEVICE — GRAFT VAS GUARD 0.8X8CM BOVINE: Type: IMPLANTABLE DEVICE | Site: CAROTID | Status: FUNCTIONAL

## 2023-09-11 RX ORDER — HYDROCODONE BITARTRATE AND ACETAMINOPHEN 5; 325 MG/1; MG/1
1 TABLET ORAL EVERY 4 HOURS PRN
Status: DISCONTINUED | OUTPATIENT
Start: 2023-09-11 | End: 2023-09-11

## 2023-09-11 RX ORDER — OXYCODONE HYDROCHLORIDE 10 MG/1
10 TABLET ORAL EVERY 6 HOURS PRN
Status: DISCONTINUED | OUTPATIENT
Start: 2023-09-11 | End: 2023-09-12 | Stop reason: HOSPADM

## 2023-09-11 RX ORDER — SODIUM CHLORIDE 0.9 % (FLUSH) 0.9 %
10 SYRINGE (ML) INJECTION
Status: DISCONTINUED | OUTPATIENT
Start: 2023-09-11 | End: 2023-10-13

## 2023-09-11 RX ORDER — FLUTICASONE FUROATE AND VILANTEROL 200; 25 UG/1; UG/1
1 POWDER RESPIRATORY (INHALATION) DAILY
Status: DISCONTINUED | OUTPATIENT
Start: 2023-09-11 | End: 2023-09-12 | Stop reason: HOSPADM

## 2023-09-11 RX ORDER — MUPIROCIN 20 MG/G
OINTMENT TOPICAL 2 TIMES DAILY
Status: DISCONTINUED | OUTPATIENT
Start: 2023-09-11 | End: 2023-09-12 | Stop reason: HOSPADM

## 2023-09-11 RX ORDER — MIDAZOLAM HYDROCHLORIDE 1 MG/ML
INJECTION, SOLUTION INTRAMUSCULAR; INTRAVENOUS
Status: DISCONTINUED | OUTPATIENT
Start: 2023-09-11 | End: 2023-09-11

## 2023-09-11 RX ORDER — OXYCODONE HYDROCHLORIDE 5 MG/1
5 TABLET ORAL EVERY 4 HOURS PRN
Status: DISCONTINUED | OUTPATIENT
Start: 2023-09-11 | End: 2023-09-12 | Stop reason: HOSPADM

## 2023-09-11 RX ORDER — HEPARIN SODIUM 5000 [USP'U]/ML
5000 INJECTION, SOLUTION INTRAVENOUS; SUBCUTANEOUS EVERY 8 HOURS
Status: DISCONTINUED | OUTPATIENT
Start: 2023-09-11 | End: 2023-09-12 | Stop reason: HOSPADM

## 2023-09-11 RX ORDER — DEXAMETHASONE SODIUM PHOSPHATE 4 MG/ML
INJECTION, SOLUTION INTRA-ARTICULAR; INTRALESIONAL; INTRAMUSCULAR; INTRAVENOUS; SOFT TISSUE
Status: DISCONTINUED | OUTPATIENT
Start: 2023-09-11 | End: 2023-09-11

## 2023-09-11 RX ORDER — CEFAZOLIN SODIUM 1 G/3ML
INJECTION, POWDER, FOR SOLUTION INTRAMUSCULAR; INTRAVENOUS
Status: DISCONTINUED | OUTPATIENT
Start: 2023-09-11 | End: 2023-09-11

## 2023-09-11 RX ORDER — KETAMINE HCL IN 0.9 % NACL 50 MG/5 ML
SYRINGE (ML) INTRAVENOUS
Status: DISCONTINUED | OUTPATIENT
Start: 2023-09-11 | End: 2023-09-11

## 2023-09-11 RX ORDER — ONDANSETRON 2 MG/ML
INJECTION INTRAMUSCULAR; INTRAVENOUS
Status: DISCONTINUED | OUTPATIENT
Start: 2023-09-11 | End: 2023-09-11

## 2023-09-11 RX ORDER — SODIUM CHLORIDE 9 MG/ML
INJECTION, SOLUTION INTRAVENOUS CONTINUOUS
Status: DISCONTINUED | OUTPATIENT
Start: 2023-09-11 | End: 2023-09-11

## 2023-09-11 RX ORDER — PHENYLEPHRINE HCL IN 0.9% NACL 1 MG/10 ML
SYRINGE (ML) INTRAVENOUS
Status: DISCONTINUED | OUTPATIENT
Start: 2023-09-11 | End: 2023-09-11

## 2023-09-11 RX ORDER — LOSARTAN POTASSIUM 25 MG/1
25 TABLET ORAL DAILY
Status: DISCONTINUED | OUTPATIENT
Start: 2023-09-11 | End: 2023-09-12 | Stop reason: HOSPADM

## 2023-09-11 RX ORDER — ACETAMINOPHEN 500 MG
1000 TABLET ORAL
Status: COMPLETED | OUTPATIENT
Start: 2023-09-11 | End: 2023-09-11

## 2023-09-11 RX ORDER — PROPOFOL 10 MG/ML
VIAL (ML) INTRAVENOUS
Status: DISCONTINUED | OUTPATIENT
Start: 2023-09-11 | End: 2023-09-11

## 2023-09-11 RX ORDER — FENTANYL CITRATE 50 UG/ML
INJECTION, SOLUTION INTRAMUSCULAR; INTRAVENOUS
Status: DISCONTINUED | OUTPATIENT
Start: 2023-09-11 | End: 2023-09-11

## 2023-09-11 RX ORDER — NAPROXEN SODIUM 220 MG/1
81 TABLET, FILM COATED ORAL DAILY
Status: DISCONTINUED | OUTPATIENT
Start: 2023-09-11 | End: 2023-09-12 | Stop reason: HOSPADM

## 2023-09-11 RX ORDER — PROTAMINE SULFATE 10 MG/ML
INJECTION, SOLUTION INTRAVENOUS
Status: DISCONTINUED | OUTPATIENT
Start: 2023-09-11 | End: 2023-09-11

## 2023-09-11 RX ORDER — HEPARIN SODIUM 1000 [USP'U]/ML
INJECTION, SOLUTION INTRAVENOUS; SUBCUTANEOUS
Status: DISCONTINUED | OUTPATIENT
Start: 2023-09-11 | End: 2023-09-11

## 2023-09-11 RX ORDER — ONDANSETRON 8 MG/1
8 TABLET, ORALLY DISINTEGRATING ORAL EVERY 8 HOURS PRN
Status: DISCONTINUED | OUTPATIENT
Start: 2023-09-11 | End: 2023-09-11

## 2023-09-11 RX ORDER — ONDANSETRON 2 MG/ML
4 INJECTION INTRAMUSCULAR; INTRAVENOUS EVERY 8 HOURS PRN
Status: DISCONTINUED | OUTPATIENT
Start: 2023-09-11 | End: 2023-09-12 | Stop reason: HOSPADM

## 2023-09-11 RX ORDER — NICARDIPINE HYDROCHLORIDE 0.2 MG/ML
INJECTION INTRAVENOUS CONTINUOUS PRN
Status: DISCONTINUED | OUTPATIENT
Start: 2023-09-11 | End: 2023-09-11

## 2023-09-11 RX ORDER — ACETAMINOPHEN 500 MG
1000 TABLET ORAL EVERY 8 HOURS
Status: DISCONTINUED | OUTPATIENT
Start: 2023-09-11 | End: 2023-09-12 | Stop reason: HOSPADM

## 2023-09-11 RX ORDER — HYDROMORPHONE HYDROCHLORIDE 1 MG/ML
0.2 INJECTION, SOLUTION INTRAMUSCULAR; INTRAVENOUS; SUBCUTANEOUS EVERY 5 MIN PRN
Status: DISCONTINUED | OUTPATIENT
Start: 2023-09-11 | End: 2023-09-11

## 2023-09-11 RX ORDER — ROCURONIUM BROMIDE 10 MG/ML
INJECTION, SOLUTION INTRAVENOUS
Status: DISCONTINUED | OUTPATIENT
Start: 2023-09-11 | End: 2023-09-11

## 2023-09-11 RX ORDER — PROCHLORPERAZINE EDISYLATE 5 MG/ML
5 INJECTION INTRAMUSCULAR; INTRAVENOUS EVERY 30 MIN PRN
Status: COMPLETED | OUTPATIENT
Start: 2023-09-11 | End: 2023-09-11

## 2023-09-11 RX ORDER — HALOPERIDOL 5 MG/ML
0.5 INJECTION INTRAMUSCULAR EVERY 6 HOURS PRN
Status: DISCONTINUED | OUTPATIENT
Start: 2023-09-11 | End: 2023-09-12 | Stop reason: HOSPADM

## 2023-09-11 RX ORDER — ALBUTEROL SULFATE 90 UG/1
2 AEROSOL, METERED RESPIRATORY (INHALATION) 4 TIMES DAILY PRN
Status: DISCONTINUED | OUTPATIENT
Start: 2023-09-11 | End: 2023-09-12 | Stop reason: HOSPADM

## 2023-09-11 RX ORDER — MONTELUKAST SODIUM 10 MG/1
10 TABLET ORAL DAILY
Status: DISCONTINUED | OUTPATIENT
Start: 2023-09-11 | End: 2023-09-12 | Stop reason: HOSPADM

## 2023-09-11 RX ORDER — HEPARIN SODIUM 1000 [USP'U]/ML
INJECTION, SOLUTION INTRAVENOUS; SUBCUTANEOUS
Status: DISCONTINUED | OUTPATIENT
Start: 2023-09-11 | End: 2023-09-11 | Stop reason: HOSPADM

## 2023-09-11 RX ORDER — HALOPERIDOL 5 MG/ML
INJECTION INTRAMUSCULAR
Status: COMPLETED
Start: 2023-09-11 | End: 2023-09-11

## 2023-09-11 RX ORDER — ATORVASTATIN CALCIUM 20 MG/1
20 TABLET, FILM COATED ORAL DAILY
Status: DISCONTINUED | OUTPATIENT
Start: 2023-09-11 | End: 2023-09-12 | Stop reason: HOSPADM

## 2023-09-11 RX ADMIN — PROTAMINE SULFATE 10 MG: 10 INJECTION, SOLUTION INTRAVENOUS at 08:09

## 2023-09-11 RX ADMIN — HALOPERIDOL LACTATE 0.5 MG: 5 INJECTION, SOLUTION INTRAMUSCULAR at 01:09

## 2023-09-11 RX ADMIN — ONDANSETRON 4 MG: 2 INJECTION INTRAMUSCULAR; INTRAVENOUS at 09:09

## 2023-09-11 RX ADMIN — ACETAMINOPHEN 1000 MG: 500 TABLET ORAL at 09:09

## 2023-09-11 RX ADMIN — CEFAZOLIN 2 G: 330 INJECTION, POWDER, FOR SOLUTION INTRAMUSCULAR; INTRAVENOUS at 07:09

## 2023-09-11 RX ADMIN — ACETAMINOPHEN 1000 MG: 500 TABLET ORAL at 01:09

## 2023-09-11 RX ADMIN — NICARDIPINE HYDROCHLORIDE 7.5 MG/HR: 0.2 INJECTION, SOLUTION INTRAVENOUS at 09:09

## 2023-09-11 RX ADMIN — Medication 30 MG: at 07:09

## 2023-09-11 RX ADMIN — FENTANYL CITRATE 25 MCG: 50 INJECTION INTRAMUSCULAR; INTRAVENOUS at 09:09

## 2023-09-11 RX ADMIN — PROTAMINE SULFATE 40 MG: 10 INJECTION, SOLUTION INTRAVENOUS at 09:09

## 2023-09-11 RX ADMIN — CEFAZOLIN 2 G: 2 INJECTION, POWDER, FOR SOLUTION INTRAMUSCULAR; INTRAVENOUS at 01:09

## 2023-09-11 RX ADMIN — PROCHLORPERAZINE EDISYLATE 5 MG: 5 INJECTION INTRAMUSCULAR; INTRAVENOUS at 11:09

## 2023-09-11 RX ADMIN — Medication 100 MCG: at 07:09

## 2023-09-11 RX ADMIN — Medication 10 MG: at 08:09

## 2023-09-11 RX ADMIN — FENTANYL CITRATE 50 MCG: 50 INJECTION INTRAMUSCULAR; INTRAVENOUS at 07:09

## 2023-09-11 RX ADMIN — CEFAZOLIN 2 G: 2 INJECTION, POWDER, FOR SOLUTION INTRAMUSCULAR; INTRAVENOUS at 09:09

## 2023-09-11 RX ADMIN — Medication 10 MG: at 09:09

## 2023-09-11 RX ADMIN — SODIUM CHLORIDE 0.8 MCG/KG/MIN: 9 INJECTION, SOLUTION INTRAVENOUS at 07:09

## 2023-09-11 RX ADMIN — ONDANSETRON 4 MG: 2 INJECTION INTRAMUSCULAR; INTRAVENOUS at 02:09

## 2023-09-11 RX ADMIN — PROPOFOL 100 MG: 10 INJECTION, EMULSION INTRAVENOUS at 07:09

## 2023-09-11 RX ADMIN — ROCURONIUM BROMIDE 50 MG: 10 INJECTION INTRAVENOUS at 07:09

## 2023-09-11 RX ADMIN — FENTANYL CITRATE 25 MCG: 50 INJECTION INTRAMUSCULAR; INTRAVENOUS at 10:09

## 2023-09-11 RX ADMIN — SODIUM CHLORIDE: 0.9 INJECTION, SOLUTION INTRAVENOUS at 06:09

## 2023-09-11 RX ADMIN — SODIUM CHLORIDE, SODIUM GLUCONATE, SODIUM ACETATE, POTASSIUM CHLORIDE, MAGNESIUM CHLORIDE, SODIUM PHOSPHATE, DIBASIC, AND POTASSIUM PHOSPHATE: .53; .5; .37; .037; .03; .012; .00082 INJECTION, SOLUTION INTRAVENOUS at 07:09

## 2023-09-11 RX ADMIN — MIDAZOLAM 2 MG: 1 INJECTION INTRAMUSCULAR; INTRAVENOUS at 06:09

## 2023-09-11 RX ADMIN — ROCURONIUM BROMIDE 30 MG: 10 INJECTION INTRAVENOUS at 07:09

## 2023-09-11 RX ADMIN — DEXAMETHASONE SODIUM PHOSPHATE 4 MG: 4 INJECTION INTRA-ARTICULAR; INTRALESIONAL; INTRAMUSCULAR; INTRAVENOUS; SOFT TISSUE at 07:09

## 2023-09-11 RX ADMIN — Medication 200 MCG: at 07:09

## 2023-09-11 RX ADMIN — MUPIROCIN: 20 OINTMENT TOPICAL at 09:09

## 2023-09-11 RX ADMIN — PROCHLORPERAZINE EDISYLATE 5 MG: 5 INJECTION INTRAMUSCULAR; INTRAVENOUS at 10:09

## 2023-09-11 RX ADMIN — HEPARIN SODIUM 5000 UNITS: 5000 INJECTION INTRAVENOUS; SUBCUTANEOUS at 02:09

## 2023-09-11 RX ADMIN — ACETAMINOPHEN 1000 MG: 500 TABLET ORAL at 06:09

## 2023-09-11 RX ADMIN — MUPIROCIN: 20 OINTMENT TOPICAL at 01:09

## 2023-09-11 RX ADMIN — OXYCODONE HYDROCHLORIDE 5 MG: 5 TABLET ORAL at 01:09

## 2023-09-11 RX ADMIN — HEPARIN SODIUM 5000 UNITS: 5000 INJECTION INTRAVENOUS; SUBCUTANEOUS at 09:09

## 2023-09-11 RX ADMIN — Medication 100 MCG: at 09:09

## 2023-09-11 RX ADMIN — HEPARIN SODIUM 7000 UNITS: 1000 INJECTION, SOLUTION INTRAVENOUS; SUBCUTANEOUS at 08:09

## 2023-09-11 NOTE — PLAN OF CARE
Problem: Adult Inpatient Plan of Care  Goal: Plan of Care Review  Outcome: Ongoing, Progressing  Goal: Patient-Specific Goal (Individualized)  Outcome: Ongoing, Progressing  Goal: Optimal Comfort and Wellbeing  Outcome: Ongoing, Progressing     Problem: Bleeding (Carotid Revascularization)  Goal: Absence of Bleeding  Outcome: Ongoing, Progressing     Problem: Pain (Carotid Revascularization)  Goal: Acceptable Pain Control  Outcome: Ongoing, Progressing     Problem: Postoperative Nausea and Vomiting (Carotid Revascularization)  Goal: Nausea and Vomiting Relief  Outcome: Ongoing, Progressing

## 2023-09-11 NOTE — ANESTHESIA PROCEDURE NOTES
Arterial    Diagnosis: ICA stenosis    Patient location during procedure: done in OR    Staffing  Authorizing Provider: Gustavo Alvarado MD  Performing Provider: Yesi Sutherland MD    Staffing  Performed by: Yesi Sutherland MD  Authorized by: Gustavo Alvarado MD    Anesthesiologist was present at the time of the procedure.    Preanesthetic Checklist  Completed: patient identified, IV checked, site marked, risks and benefits discussed, surgical consent, monitors and equipment checked, pre-op evaluation, timeout performed and anesthesia consent givenArterial  Skin Prep: chlorhexidine gluconate  Orientation: left  Location: radial    Catheter Size: 20 G  Catheter placement by Ultrasound guidance and Anatomical landmarks. Heme positive aspiration all ports.   Vessel Caliber: patent  Needle advanced into vessel with real time Ultrasound guidance.Insertion Attempts: 1  Assessment  Dressing: secured with tape and tegaderm  Patient: Tolerated well

## 2023-09-11 NOTE — ANESTHESIA PROCEDURE NOTES
Intubation    Date/Time: 9/11/2023 7:02 AM    Performed by: Yesi Sutherland MD  Authorized by: Gustavo Alvarado MD    Intubation:     Induction:  Intravenous    Intubated:  Postinduction    Mask Ventilation:  Easy with oral airway    Attempts:  1    Attempted By:  Resident anesthesiologist    Method of Intubation:  Direct    Blade:  Singh 2    Laryngeal View Grade: Grade IIA - cords partially seen      Difficult Airway Encountered?: No      Complications:  None    Airway Device:  Oral endotracheal tube    Airway Device Size:  7.0    Style/Cuff Inflation:  Cuffed    Inflation Amount (mL):  8    Tube secured:  22    Secured at:  The lips    Placement Verified By:  Capnometry    Complicating Factors:  None    Findings Post-Intubation:  BS equal bilateral and atraumatic/condition of teeth unchanged

## 2023-09-11 NOTE — NURSING TRANSFER
Nursing Transfer Note      9/11/2023   2:52 PM    Nurse giving handoff:JOANNA Bragg RN PACU  Nurse receiving handoff:Jessica HELMS    Reason patient is being transferred: post surgery    Transfer To: 1009    Transfer via stretcher    Transfer with IV pump/fluid    Transported by PCT    Transfer Vital Signs:  Please see flow sheet     Telemetry: Box Number N/A  Order for Tele Monitor? No    Additional Lines: none    Medicines sent: none    Any special needs or follow-up needed: routine    Patient belongings transferred with patient:  N/A    Chart send with patient: Yes    Notified: family via surgical texting system     Patient reassessed at: 9/11/2023 at 1400    Upon arrival to floor: cardiac monitor applied, patient oriented to room, and bed in lowest position

## 2023-09-11 NOTE — OP NOTE
OPERATIVE REPORT    Patient: Nakul Britton  Surgery Date: 09/11/2023  Surgeon(s) and Role:     * MAISHA Cui III, MD - Primary     * William Ramos MD - Fellow     Pre-op Diagnosis:  Stenosis of left carotid artery [I65.22], 80%, progressive, Asx     Post-op Diagnosis:  same     Procedure(s) (LRB):  ENDARTERECTOMY-CAROTID (Left) with bovine patch     Anesthesia: General       Estimated Blood Loss:  50cc    Indications for Procedure:  Nakul Britton is a 62 y.o. male who presents with asymptomatic L 80% carotid stenosis.  Has been serially monitored and has been progressing in severity over the past few years.The patient was offered a left carotid endarterectomy. All risks, benefits, and alternatives were discussed and the patient understood and wished to proceed and gave written consent.     Operative Findings: Low bifurcation, severe stenosis     Operative procedure:  The patient was placed in the supine humphries chair position.  Time-outs were performed using both pre induction and pre incision safety checklist to verify correct patient, procedure, site, and additional critical information prior to beginning the procedure.  The procedure was performed under general endotracheal anesthesia.  The left neck was prepped and draped in standard sterile fashion.  A longitudinal incision was made over the anterior border of the sternocleidomastoid muscle with a 15 blade scalpel.  The incision was deepened through the platysma with electrocautery.  The sternocleidomastoid was retracted laterally.  Small crossing veins were ligated with 3-0 silk suture.  The internal jugular vein was identified.  Dissection along the medial and superior surface of the internal jugular vein led to the facial vein.  The facial vein was suture ligated with 2-0 silk suture.  The common carotid, internal carotid, external carotid, and superior thyroid arteries were exposed and dissected.  An umbilical tape was passed around the  common carotid with a Liseth tourniquet.  The ICA was controlled with a short Liseth tourniquet, and the ECA and was controlled with blue silastic vessel loop.  The vagus was clearly identified and preserved.  Extreme traction to the mandible was avoided in order to protect the marginal mandibular nerve.  The patient was administered therapeutic dosing of IV heparin and an activated clotting time ACT was checked.  3 minutes after the heparin was administered the internal carotid artery was clamped with angled small blue titanium clamp.  The common carotid was then clamped with an angled DeBakey clamp.  An arteriotomy was performed on the anterior surface of the common carotid artery with an 11 blade scalpel.  The incision was extended anterior laterally over the internal carotid artery with Rangel scissors.  It was also extended proximally along the common carotid artery with Rangel scissors.  A 10 Fr in lying vascular shunt was positioned into the internal carotid and common carotid artery.     Next the endarterectomy plane was performed using a [place] elevator.  There was severe stenosis greater than 80% with friable plaque.  The plaque was transected proximally in the common carotid artery with Rangel scissors. Next an eversion technique was performed for the external carotid artery and it was flushed with heparinized saline.  There was a paucity of disease within the external carotid and common carotid.  In the distal internal carotid artery plaque was meticulously feathered off achieving a smooth endpoint. The endarterectomized artery was carefully inspected and small portions of plaque were dissected off using Thalia's forceps.  The endarterectomized surface was gently irrigating using heparinized saline.  All remaining debris was removed with fine Thalia's forceps.  The arteriotomy was closed using a patch angioplasty with bovine pericardium.  The patch was trimmed and modified and was sewn in with 6 0 Prolene  suture.  This was performed in a continuous fashion.  This suture was started at the apex of the arteriotomy and ran on each side.  The patch was appropriately trimmed at the other end using Metzenbaum scissors and a 6 0 Prolene was started at this ended the patch and ran in a continuous fashion along either side of the artery. Prior to throwing the last few throws, the shunt was removed and the internal carotid artery was allowed to back bleed.  The internal carotid artery was then again clamped.  Next the internal carotid, external carotid, and superior thyroid arteries were allowed to back bleed.  These with all clamped again the common carotid artery was flushed forward. The carotid lumen was irrigated once again with heparinized saline.  Flow was 1st reestablished to the external carotid artery and then flow was reestablished to the internal carotid artery.  The suture line was checked for hemostasis and small needle hole bleeding was controlled with Surgicel.  Doppler confirmed excellent flow through the common carotid artery, external carotid artery, and internal carotid artery.  After carefully ensuring hemostasis the wound was closed with 2-0 Vicryl suture closing the platysmal layer 1st.  The skin was closed with 4-0 Monocryl in a running continuous fashion.  Sterile Telfa/Tegaderm dressings were applied.         The patient tolerated the procedure well, the patient was awakened, and noted to have no neurological deficits and was taken to the post anesthesia recovery unit in good condition.      Dr. Cui was present for the entire case.

## 2023-09-11 NOTE — TRANSFER OF CARE
"Anesthesia Transfer of Care Note    Patient: Nakul Britton    Procedure(s) Performed: Procedure(s) (LRB):  ENDARTERECTOMY-CAROTID (Left)    Patient location: PACU    Anesthesia Type: general    Transport from OR: Transported from OR on 6-10 L/min O2 by face mask with adequate spontaneous ventilation    Post pain: adequate analgesia    Post assessment: no apparent anesthetic complications    Post vital signs: stable    Level of consciousness: awake    Complications: none    Transfer of care protocol was followed      Last vitals:   Visit Vitals  BP (!) 178/83   Pulse 71   Temp 36.7 °C (98 °F)   Resp 20   Ht 5' 4" (1.626 m)   Wt 63.5 kg (140 lb)   SpO2 95%   BMI 24.03 kg/m²     "

## 2023-09-12 ENCOUNTER — PATIENT MESSAGE (OUTPATIENT)
Dept: VASCULAR SURGERY | Facility: CLINIC | Age: 63
End: 2023-09-12
Payer: MEDICAID

## 2023-09-12 VITALS
HEART RATE: 69 BPM | RESPIRATION RATE: 20 BRPM | DIASTOLIC BLOOD PRESSURE: 60 MMHG | OXYGEN SATURATION: 97 % | HEIGHT: 64 IN | WEIGHT: 140 LBS | TEMPERATURE: 98 F | SYSTOLIC BLOOD PRESSURE: 135 MMHG | BODY MASS INDEX: 23.9 KG/M2

## 2023-09-12 LAB
ANION GAP SERPL CALC-SCNC: 10 MMOL/L (ref 8–16)
BASOPHILS # BLD AUTO: 0.01 K/UL (ref 0–0.2)
BASOPHILS NFR BLD: 0.1 % (ref 0–1.9)
BUN SERPL-MCNC: 13 MG/DL (ref 8–23)
CALCIUM SERPL-MCNC: 8.3 MG/DL (ref 8.7–10.5)
CHLORIDE SERPL-SCNC: 110 MMOL/L (ref 95–110)
CO2 SERPL-SCNC: 21 MMOL/L (ref 23–29)
CREAT SERPL-MCNC: 0.8 MG/DL (ref 0.5–1.4)
DIFFERENTIAL METHOD: ABNORMAL
EOSINOPHIL # BLD AUTO: 0 K/UL (ref 0–0.5)
EOSINOPHIL NFR BLD: 0.1 % (ref 0–8)
ERYTHROCYTE [DISTWIDTH] IN BLOOD BY AUTOMATED COUNT: 15 % (ref 11.5–14.5)
EST. GFR  (NO RACE VARIABLE): >60 ML/MIN/1.73 M^2
GLUCOSE SERPL-MCNC: 94 MG/DL (ref 70–110)
HCT VFR BLD AUTO: 36.3 % (ref 40–54)
HGB BLD-MCNC: 11.4 G/DL (ref 14–18)
IMM GRANULOCYTES # BLD AUTO: 0.02 K/UL (ref 0–0.04)
IMM GRANULOCYTES NFR BLD AUTO: 0.2 % (ref 0–0.5)
LYMPHOCYTES # BLD AUTO: 1.4 K/UL (ref 1–4.8)
LYMPHOCYTES NFR BLD: 14.8 % (ref 18–48)
MAGNESIUM SERPL-MCNC: 2.1 MG/DL (ref 1.6–2.6)
MCH RBC QN AUTO: 28.2 PG (ref 27–31)
MCHC RBC AUTO-ENTMCNC: 31.4 G/DL (ref 32–36)
MCV RBC AUTO: 90 FL (ref 82–98)
MONOCYTES # BLD AUTO: 1 K/UL (ref 0.3–1)
MONOCYTES NFR BLD: 10.2 % (ref 4–15)
NEUTROPHILS # BLD AUTO: 7 K/UL (ref 1.8–7.7)
NEUTROPHILS NFR BLD: 74.6 % (ref 38–73)
NRBC BLD-RTO: 0 /100 WBC
PHOSPHATE SERPL-MCNC: 3.2 MG/DL (ref 2.7–4.5)
PLATELET # BLD AUTO: 188 K/UL (ref 150–450)
PMV BLD AUTO: 9.9 FL (ref 9.2–12.9)
POTASSIUM SERPL-SCNC: 3.7 MMOL/L (ref 3.5–5.1)
RBC # BLD AUTO: 4.04 M/UL (ref 4.6–6.2)
SODIUM SERPL-SCNC: 141 MMOL/L (ref 136–145)
WBC # BLD AUTO: 9.41 K/UL (ref 3.9–12.7)

## 2023-09-12 PROCEDURE — 80048 BASIC METABOLIC PNL TOTAL CA: CPT | Performed by: STUDENT IN AN ORGANIZED HEALTH CARE EDUCATION/TRAINING PROGRAM

## 2023-09-12 PROCEDURE — 63600175 PHARM REV CODE 636 W HCPCS: Performed by: STUDENT IN AN ORGANIZED HEALTH CARE EDUCATION/TRAINING PROGRAM

## 2023-09-12 PROCEDURE — 36415 COLL VENOUS BLD VENIPUNCTURE: CPT | Performed by: STUDENT IN AN ORGANIZED HEALTH CARE EDUCATION/TRAINING PROGRAM

## 2023-09-12 PROCEDURE — 85025 COMPLETE CBC W/AUTO DIFF WBC: CPT | Performed by: STUDENT IN AN ORGANIZED HEALTH CARE EDUCATION/TRAINING PROGRAM

## 2023-09-12 PROCEDURE — 83735 ASSAY OF MAGNESIUM: CPT | Performed by: STUDENT IN AN ORGANIZED HEALTH CARE EDUCATION/TRAINING PROGRAM

## 2023-09-12 PROCEDURE — 25000003 PHARM REV CODE 250: Performed by: STUDENT IN AN ORGANIZED HEALTH CARE EDUCATION/TRAINING PROGRAM

## 2023-09-12 PROCEDURE — 84100 ASSAY OF PHOSPHORUS: CPT | Performed by: STUDENT IN AN ORGANIZED HEALTH CARE EDUCATION/TRAINING PROGRAM

## 2023-09-12 PROCEDURE — 63600175 PHARM REV CODE 636 W HCPCS

## 2023-09-12 RX ORDER — OXYCODONE HYDROCHLORIDE 5 MG/1
5 TABLET ORAL EVERY 6 HOURS PRN
Qty: 12 TABLET | Refills: 0 | Status: SHIPPED | OUTPATIENT
Start: 2023-09-12 | End: 2023-10-13

## 2023-09-12 RX ORDER — POLYETHYLENE GLYCOL 3350 17 G/17G
17 POWDER, FOR SOLUTION ORAL DAILY
Qty: 510 G | Refills: 0 | Status: SHIPPED | OUTPATIENT
Start: 2023-09-12 | End: 2023-10-13

## 2023-09-12 RX ORDER — ACETAMINOPHEN 500 MG
1000 TABLET ORAL EVERY 8 HOURS
Qty: 30 TABLET | Refills: 0 | Status: SHIPPED | OUTPATIENT
Start: 2023-09-12 | End: 2023-10-13

## 2023-09-12 RX ADMIN — OXYCODONE HYDROCHLORIDE 5 MG: 5 TABLET ORAL at 10:09

## 2023-09-12 RX ADMIN — HEPARIN SODIUM 5000 UNITS: 5000 INJECTION INTRAVENOUS; SUBCUTANEOUS at 05:09

## 2023-09-12 RX ADMIN — MONTELUKAST 10 MG: 10 TABLET, FILM COATED ORAL at 08:09

## 2023-09-12 RX ADMIN — LOSARTAN POTASSIUM 25 MG: 25 TABLET, FILM COATED ORAL at 08:09

## 2023-09-12 RX ADMIN — SODIUM CHLORIDE, POTASSIUM CHLORIDE, SODIUM LACTATE AND CALCIUM CHLORIDE 500 ML: 600; 310; 30; 20 INJECTION, SOLUTION INTRAVENOUS at 01:09

## 2023-09-12 RX ADMIN — MUPIROCIN: 20 OINTMENT TOPICAL at 08:09

## 2023-09-12 RX ADMIN — CEFAZOLIN 2 G: 2 INJECTION, POWDER, FOR SOLUTION INTRAMUSCULAR; INTRAVENOUS at 05:09

## 2023-09-12 RX ADMIN — ASPIRIN 81 MG 81 MG: 81 TABLET ORAL at 08:09

## 2023-09-12 RX ADMIN — OXYCODONE HYDROCHLORIDE 5 MG: 5 TABLET ORAL at 06:09

## 2023-09-12 NOTE — DISCHARGE INSTRUCTIONS
VASCULAR SURGERY DISCHARGE INSTRUCTIONS    Woundcare:  Remove dressing on your neck two days after surgery  If you have steri-strips in place, they will fall off on their own  Shower every day  Do not submerge in pools and tubs   Soap and water only to neck, do not apply ointments   Report any excess redness, drainage, swelling of incision to vascular clinic ASAP     Activity:  - Activity is good for you. Try to walk frequently and increase walking distance every day  - No heavy lifting for 2 weeks  - No baths, swimming in pools, lakes, jacuzzi etc. for 2 weeks     Diet:  -Resume your pre-operative home diet    Follow up:  -Follow up for ultrasound and vascular surgery clinic appointment in ~2 weeks    Call Vascular Surgery Office at [phone] if you experience:  -Increased redness, warmth, tenderness, or draining pus from your incision  -Increased pain/swelling at your incision  -Worsening fevers, chills, nausea/vomiting  -Pain, weakness, coldness, or numbness in your legs  -Uncontrolled pain  -Your call will be returned within 24 hours and further instructions will be provided    Go to ER/Urgent Care if you experience:  -Worsening shortness of breath or chest pain  -Sudden severe pain and swelling at your incision site

## 2023-09-12 NOTE — ANESTHESIA POSTPROCEDURE EVALUATION
Anesthesia Post Evaluation    Patient: Naklu Britton    Procedure(s) Performed: Procedure(s) (LRB):  ENDARTERECTOMY-CAROTID (Left)    Final Anesthesia Type: general      Patient location during evaluation: PACU  Patient participation: Yes- Able to Participate  Level of consciousness: awake and alert  Post-procedure vital signs: reviewed and stable  Pain management: adequate  Airway patency: patent    PONV status at discharge: No PONV  Anesthetic complications: no      Cardiovascular status: blood pressure returned to baseline  Respiratory status: unassisted  Follow-up not needed.          Event Time   Out of Recovery 11:45:00         Pain/Paddy Score: Pain Rating Prior to Med Admin: 4 (9/12/2023 10:11 AM)  Pain Rating Post Med Admin: 1 (9/12/2023 11:11 AM)  Paddy Score: 9 (9/11/2023 11:45 AM)

## 2023-09-12 NOTE — PLAN OF CARE
[facility] Plan of Care Note  Dx Bilateral Carotid Artery Stenosis    Shift Events BP Hypotensive, Md on call notified and bolus given. VSS at this time.    Goals of Care: pain management, monitor BP    Neuro: AAO x 4    Vital Signs: VSS    Respiratory: RA    Diet: Regular    Is patient tolerating current diet? yes    GTTS: none    Urine Output/Bowel Movement: adequate urine output/last BM 9-10-23    Drains/Tubes/Tube Feeds (include total output/shift): none    Lines: 22 g R hand saline locked      Accuchecks:  none    Skin: left neck surgical incision    Fall Risk Score: 10    Activity level? Standby assist    Any scheduled procedures? none    Any safety concerns? Fall precautions    Other: none     Problem: Adult Inpatient Plan of Care  Goal: Plan of Care Review  Outcome: Ongoing, Progressing  Goal: Patient-Specific Goal (Individualized)  Outcome: Ongoing, Progressing  Goal: Absence of Hospital-Acquired Illness or Injury  Outcome: Ongoing, Progressing  Goal: Optimal Comfort and Wellbeing  Outcome: Ongoing, Progressing  Goal: Readiness for Transition of Care  Outcome: Ongoing, Progressing     Problem: Fall Injury Risk  Goal: Absence of Fall and Fall-Related Injury  Outcome: Ongoing, Progressing     Problem: Bleeding (Carotid Revascularization)  Goal: Absence of Bleeding  Outcome: Ongoing, Progressing     Problem: Infection (Carotid Revascularization)  Goal: Absence of Infection Signs and Symptoms  Outcome: Ongoing, Progressing     Problem: Pain (Carotid Revascularization)  Goal: Acceptable Pain Control  Outcome: Ongoing, Progressing     Problem: Postoperative Nausea and Vomiting (Carotid Revascularization)  Goal: Nausea and Vomiting Relief  Outcome: Ongoing, Progressing

## 2023-09-12 NOTE — NURSING
1530 Patient received from PACU; AAO x4; ambulated from stretcher to bed; L neck dressing CDI; patient denies pain; VSS; sister at bedside     1630- Updated patient and family on POC; assessment unchanged    1900- Report to Vijaya REYNAGA

## 2023-09-12 NOTE — DISCHARGE SUMMARY
[facility]  Vascular Surgery  Discharge Summary      Patient Name: Nakul Britton  MRN: [id number]  Admission Date: 9/11/2023  Hospital Length of Stay: 1 days  Discharge Date and Time:  09/12/2023 7:57 AM  Attending Physician: MAISHA Cui III, MD   Discharging Provider: Sherri Mann NP  Primary Care Provider: Zaida Ornelas MD    HPI:   No notes on file    Procedure(s) (LRB):  ENDARTERECTOMY-CAROTID (Left)     Hospital Course: For details of hospital stay, please refer to daily progress notes. Briefly, this is a 62 y.o. male presented on 9/11  for planned surgical intervention for left carotid stenosis. Patient was taken to  Post-operatively patient was admitted to the floor and had an uncomplicated hospital recovery.     On the day of discharge, the patient was ambulating without difficulty, voiding spontaneously, was tolerating a diet without nausea or vomiting, and pain was well controlled on PO pain medications. Discharge instructions were explained to the patient and appropriate follow-up was arranged.       Goals of Care Treatment Preferences:  Code Status: Full Code      Consults:     Significant Diagnostic Studies: Labs:   CMP   Recent Labs   Lab 09/11/23  1248 09/12/23  0309    141   K 4.2 3.7   * 110   CO2 21* 21*   * 94   BUN 13 13   CREATININE 0.7 0.8   CALCIUM 8.0* 8.3*   ANIONGAP 7* 10    and CBC   Recent Labs   Lab 09/11/23  1248 09/12/23  0309   WBC 10.81 9.41   HGB 12.0* 11.4*   HCT 35.9* 36.3*    188       Pending Diagnostic Studies:     None        Final Active Diagnoses:    Diagnosis Date Noted POA    PRINCIPAL PROBLEM:  Stenosis of left carotid artery [I65.22] 05/17/2022 Yes    Bilateral carotid artery stenosis [I65.23] 08/14/2023 Yes    Essential hypertension [I10] 06/17/2022 Yes    COPD (chronic obstructive pulmonary disease) [J44.9] 06/12/2019 Yes    Hyperlipidemia [E78.5] 04/11/2016 Yes      Problems Resolved During this Admission:       Constitutional:  Alert,   Well-appearing  In no distress.   Neurological: Normal speech  no focal findings  CN II - XII grossly intact.    Psychiatric: Mood and affect appropriate and symmetric.   HEENT: Normocephalic / atraumatic  PERRLA  Midline trachea  No scars across the neck   Cardiac: Regular rate and rhythm.   Pulmonary: Normal pulmonary effort.   Abdomen: Soft, not distended.     Skin: Warm and well perfused.   Left neck surgical incision covered with gauze    Vascular:  Radial pulses are 2+   Extremities/  Musculoskeletal: No edema.    Normal ROM       Discharged Condition: good    Disposition: Home or Self Care    Follow Up:   Follow-up Information     MAISHA Cui III, MD Follow up in 1 month(s).    Specialty: Vascular Surgery  Contact information:  [facility] [address]  [phone]                         Patient Instructions:      Notify your health care provider if you experience any of the following:     Notify your health care provider if you experience any of the following:  persistent dizziness, light-headedness, or visual disturbances     Notify your health care provider if you experience any of the following:  increased confusion or weakness     Notify your health care provider if you experience any of the following:  worsening rash     Notify your health care provider if you experience any of the following:  severe persistent headache     Notify your health care provider if you experience any of the following:  difficulty breathing or increased cough     Notify your health care provider if you experience any of the following:  redness, tenderness, or signs of infection (pain, swelling, redness, odor or green/yellow discharge around incision site)     Notify your health care provider if you experience any of the following:  severe uncontrolled pain     Notify your health care provider if you experience any of the following:  persistent nausea and vomiting or diarrhea      Notify your health care provider if you experience any of the following:  temperature >100.4     Change dressing (specify)   Order Comments: Remove dressing on your neck two days after surgery  If you have steri-strips in place, they will fall off on their own  Shower every day  Do not submerge in pools and tubs   Soap and water only to neck, do not apply ointments   Report any excess redness, drainage, swelling of incision to vascular clinic ASAP     Activity as tolerated     Medications:  Reconciled Home Medications:      Medication List      START taking these medications    acetaminophen 500 MG tablet  Commonly known as: TYLENOL  Take 2 tablets (1,000 mg total) by mouth every 8 (eight) hours.     oxyCODONE 5 MG immediate release tablet  Commonly known as: ROXICODONE  Take 1 tablet (5 mg total) by mouth every 6 (six) hours as needed for Pain (severe pain).     polyethylene glycol 17 gram/dose powder  Commonly known as: GLYCOLAX  Use to cap to measure 17g, mix with liquid, and take by mouth once daily.        CHANGE how you take these medications    montelukast 10 mg tablet  Commonly known as: SINGULAIR  Take 1 tablet (10 mg total) by mouth every evening.  What changed: when to take this        CONTINUE taking these medications    albuterol 90 mcg/actuation inhaler  Commonly known as: PROVENTIL/VENTOLIN HFA  INHALE 2 PUFFS BY MOUTH FOUR TIMES DAILY AS NEEDED     aspirin 81 MG EC tablet  Commonly known as: ECOTRIN  Take 1 tablet (81 mg total) by mouth once daily.     fluticasone-salmeterol 500-50 mcg/dose 500-50 mcg/dose Dsdv diskus inhaler  Commonly known as: ADVAIR DISKUS  Inhale 1 puff into the lungs 2 (two) times daily. Controller     losartan 25 MG tablet  Commonly known as: COZAAR  Take 1 tablet (25 mg total) by mouth once daily.     rosuvastatin 5 MG tablet  Commonly known as: CRESTOR  Take 1 tablet (5 mg total) by mouth once daily.     sildenafiL 100 MG tablet  Commonly known as: VIAGRA  TAKE ONE DAILY  AS NEEDED FOR ERECTILE DYSFUNCTION            Sherri Mann NP  Vascular Surgery  [facility]

## 2023-09-12 NOTE — ASSESSMENT & PLAN NOTE
62 year old male with pmh bilateral carotid artery stenosis now s/p left carotid endarterectomy on 9/11.    -Encourage ambulation  -Incentive spirometer  -Oxy prn for pain  -Cardiac diet  -Keep dressing on neck until tomorrow, instructions in discharge  -Aspirin and statin     Dispo:  DC, follow up in four weeks with carotid ultrasound

## 2023-09-12 NOTE — NURSING
"Patient BP 89/48 HR 66. Patient states no other symptoms, "I feel fine." Md on call notified and came to bedside to  assess patient. No distress noted. WCTM  "

## 2023-09-12 NOTE — HOSPITAL COURSE
For details of hospital stay, please refer to daily progress notes. Briefly, this is a 62 y.o. male presented on 9/11  for planned surgical intervention for left carotid stenosis. Patient was taken to  Post-operatively patient was admitted to the floor and had an uncomplicated hospital recovery.     On the day of discharge, the patient was ambulating without difficulty, voiding spontaneously, was tolerating a diet without nausea or vomiting, and pain was well controlled on PO pain medications. Discharge instructions were explained to the patient and appropriate follow-up was arranged.    19-May-2023 14:50

## 2023-10-05 DIAGNOSIS — I10 PRIMARY HYPERTENSION: ICD-10-CM

## 2023-10-05 RX ORDER — LOSARTAN POTASSIUM 25 MG/1
25 TABLET ORAL DAILY
Qty: 90 TABLET | Refills: 3 | Status: SHIPPED | OUTPATIENT
Start: 2023-10-05 | End: 2024-02-26 | Stop reason: SDUPTHER

## 2023-10-10 ENCOUNTER — HOSPITAL ENCOUNTER (OUTPATIENT)
Dept: VASCULAR SURGERY | Facility: CLINIC | Age: 63
Discharge: HOME | End: 2023-10-10
Attending: SURGERY
Payer: MEDICAID

## 2023-10-10 ENCOUNTER — OFFICE VISIT (OUTPATIENT)
Dept: VASCULAR SURGERY | Facility: CLINIC | Age: 63
End: 2023-10-10
Attending: SURGERY
Payer: MEDICAID

## 2023-10-10 VITALS
DIASTOLIC BLOOD PRESSURE: 65 MMHG | HEART RATE: 53 BPM | SYSTOLIC BLOOD PRESSURE: 148 MMHG | TEMPERATURE: 98 F | WEIGHT: 143.31 LBS | HEIGHT: 64 IN | BODY MASS INDEX: 24.46 KG/M2

## 2023-10-10 DIAGNOSIS — I65.23 BILATERAL CAROTID ARTERY STENOSIS: ICD-10-CM

## 2023-10-10 DIAGNOSIS — Z98.890 S/P CAROTID ENDARTERECTOMY: ICD-10-CM

## 2023-10-10 DIAGNOSIS — I65.23 BILATERAL CAROTID ARTERY STENOSIS: Primary | ICD-10-CM

## 2023-10-10 PROCEDURE — 4010F ACE/ARB THERAPY RXD/TAKEN: CPT | Mod: CPTII,,, | Performed by: SURGERY

## 2023-10-10 PROCEDURE — 1159F MED LIST DOCD IN RCRD: CPT | Mod: CPTII,,, | Performed by: SURGERY

## 2023-10-10 PROCEDURE — 3077F PR MOST RECENT SYSTOLIC BLOOD PRESSURE >= 140 MM HG: ICD-10-PCS | Mod: CPTII,,, | Performed by: SURGERY

## 2023-10-10 PROCEDURE — 93880 EXTRACRANIAL BILAT STUDY: CPT | Mod: 26,S$PBB,, | Performed by: SURGERY

## 2023-10-10 PROCEDURE — 99024 POSTOP FOLLOW-UP VISIT: CPT | Mod: ,,, | Performed by: SURGERY

## 2023-10-10 PROCEDURE — 93880 PR DUPLEX SCAN EXTRACRANIAL,BILAT: ICD-10-PCS | Mod: 26,S$PBB,, | Performed by: SURGERY

## 2023-10-10 PROCEDURE — 3044F PR MOST RECENT HEMOGLOBIN A1C LEVEL <7.0%: ICD-10-PCS | Mod: CPTII,,, | Performed by: SURGERY

## 2023-10-10 PROCEDURE — 3077F SYST BP >= 140 MM HG: CPT | Mod: CPTII,,, | Performed by: SURGERY

## 2023-10-10 PROCEDURE — 1159F PR MEDICATION LIST DOCUMENTED IN MEDICAL RECORD: ICD-10-PCS | Mod: CPTII,,, | Performed by: SURGERY

## 2023-10-10 PROCEDURE — 4010F PR ACE/ARB THEARPY RXD/TAKEN: ICD-10-PCS | Mod: CPTII,,, | Performed by: SURGERY

## 2023-10-10 PROCEDURE — 99999 PR PBB SHADOW E&M-EST. PATIENT-LVL III: CPT | Mod: PBBFAC,,, | Performed by: SURGERY

## 2023-10-10 PROCEDURE — 99213 OFFICE O/P EST LOW 20 MIN: CPT | Mod: PBBFAC | Performed by: SURGERY

## 2023-10-10 PROCEDURE — 3078F PR MOST RECENT DIASTOLIC BLOOD PRESSURE < 80 MM HG: ICD-10-PCS | Mod: CPTII,,, | Performed by: SURGERY

## 2023-10-10 PROCEDURE — 99024 PR POST-OP FOLLOW-UP VISIT: ICD-10-PCS | Mod: ,,, | Performed by: SURGERY

## 2023-10-10 PROCEDURE — 99999 PR PBB SHADOW E&M-EST. PATIENT-LVL III: ICD-10-PCS | Mod: PBBFAC,,, | Performed by: SURGERY

## 2023-10-10 PROCEDURE — 1160F RVW MEDS BY RX/DR IN RCRD: CPT | Mod: CPTII,,, | Performed by: SURGERY

## 2023-10-10 PROCEDURE — 3078F DIAST BP <80 MM HG: CPT | Mod: CPTII,,, | Performed by: SURGERY

## 2023-10-10 PROCEDURE — 1160F PR REVIEW ALL MEDS BY PRESCRIBER/CLIN PHARMACIST DOCUMENTED: ICD-10-PCS | Mod: CPTII,,, | Performed by: SURGERY

## 2023-10-10 PROCEDURE — 3044F HG A1C LEVEL LT 7.0%: CPT | Mod: CPTII,,, | Performed by: SURGERY

## 2023-10-10 PROCEDURE — 93880 EXTRACRANIAL BILAT STUDY: CPT | Mod: PBBFAC | Performed by: SURGERY

## 2023-10-10 NOTE — PROGRESS NOTES
VASCULAR SURGERY SERVICE    REFERRING DOCTOR: SHELLY Ornelas MD    CHIEF COMPLAINT:  Carotid stenosis    HISTORY OF PRESENT ILLNESS: Nakul Britton is a 62 y.o. male no prior history of stroke TIA or amaurosis, no history of coronary artery disease, with a recently discovered 70-80% left carotid stenosis prompted by finding of carotid bruit.  Prior to this he was on no statin, was recently prescribed Lipitor 40 mg daily.  Said he did not tolerate this dose but after cutting and half was able to take it.  He is not taking any aspirin currently, although also prescribed to him recently    He endorses a 40 year history of diffuse right-sided numbness she says has largely resolved since he began starting statin therapy.\    Reports that he can walk 5 stairs without difficulty.  He does not exercise on a regular basis but says it occasionally goes for job without any trouble.  He does have asthma and is on 2 inhalers.  States that he needs steroid tapers once or twice a year with asthma flares    He has no history of neck irradiation    6/7/2022:  This is a 3 week follow-up.  Continues to be neurologically asymptomatic.  As per my prior note I spent nearly an hour discussing treatment alternatives regarding his asymptomatic carotid disease.  He wishes to proceed medical therapy    9/13/2022:   He states he is currently taking only 20 mg of Lipitor every other day, as well as aspirin every other day.  States that the days he takes this low dose Lipitor that he feels lightheaded.  Denies interval stroke TIA or amaurosis    08/15/2023:  He has been almost a year since I last saw him.  He continues to be neurologically asymptomatic with no stroke TIA or amaurosis.  He had a 2nd opinion with Dr. Seble ARZOLA.  CTA report suggested 75-80% carotid stenosis.  Per the patient to palate had also recommended carotid endarterectomy.    He is taking aspirin 81 mg daily and Lipitor every other day.  He says if he takes it every day  makes him feel bad.  He is very active runs for exercise can easily walk up a flight of stairs without difficulty.  No history of coronary artery disease chest pain shortness of breath myocardial infarction    10/10/2023:  Follow-up visit status post left carotid endarterectomy 09/11/2023.  He had unremarkable course was discharged on the 1st postoperative day.  Denies interval stroke TIA or amaurosis.  States compliance with aspirin every day and statin which he takes only every other day otherwise he gets a brain fog.    Past Medical History:   Diagnosis Date    Allergy     Asthma     Asthma 4/21/2023    Essential hypertension 6/17/2022    Hiatal hernia        Past Surgical History:   Procedure Laterality Date    CAROTID ENDARTERECTOMY Left 9/11/2023    Procedure: ENDARTERECTOMY-CAROTID;  Surgeon: MAISHA Cui III, MD;  Location: [facility] OR [address] [place] [address];  Service: Vascular;  Laterality: Left;    CATARACT EXTRACTION           Current Outpatient Medications:     albuterol (PROVENTIL/VENTOLIN HFA) 90 mcg/actuation inhaler, INHALE 2 PUFFS BY MOUTH FOUR TIMES DAILY AS NEEDED, Disp: 54 g, Rfl: 1    aspirin (ECOTRIN) 81 MG EC tablet, Take 1 tablet (81 mg total) by mouth once daily., Disp: 90 tablet, Rfl: 3    fluticasone-salmeterol diskus inhaler 500-50 mcg, Inhale 1 puff into the lungs 2 (two) times daily. Controller, Disp: 60 each, Rfl: 11    losartan (COZAAR) 25 MG tablet, Take 1 tablet (25 mg total) by mouth once daily., Disp: 90 tablet, Rfl: 3    montelukast (SINGULAIR) 10 mg tablet, Take 1 tablet (10 mg total) by mouth every evening. (Patient taking differently: Take 10 mg by mouth once daily.), Disp: 90 tablet, Rfl: 1    sildenafiL (VIAGRA) 100 MG tablet, TAKE ONE DAILY AS NEEDED FOR ERECTILE DYSFUNCTION, Disp: 30 tablet, Rfl: 5    acetaminophen (TYLENOL) 500 MG tablet, Take 2 tablets (1,000 mg total) by mouth every 8 (eight) hours., Disp: 30 tablet, Rfl: 0    oxyCODONE (ROXICODONE) 5 MG immediate release tablet,  "Take 1 tablet (5 mg total) by mouth every 6 (six) hours as needed for Pain (severe pain)., Disp: 12 tablet, Rfl: 0    polyethylene glycol (GLYCOLAX) 17 gram/dose powder, Use to cap to measure 17g, mix with liquid, and take by mouth once daily., Disp: 510 g, Rfl: 0    rosuvastatin (CRESTOR) 5 MG tablet, Take 1 tablet (5 mg total) by mouth once daily., Disp: 90 tablet, Rfl: 3  No current facility-administered medications for this visit.    Facility-Administered Medications Ordered in Other Visits:     sodium chloride 0.9% flush 10 mL, 10 mL, Intravenous, PRN, William Ramos MD    Review of patient's allergies indicates:   Allergen Reactions    Center-al house dust Shortness Of Breath    House dust mite Shortness Of Breath    Sealevel oil Rash       Family History   Problem Relation Age of Onset    No Known Problems Mother     No Known Problems Father        Social History     Tobacco Use    Smoking status: Never    Smokeless tobacco: Never   Substance Use Topics    Alcohol use: Yes     Alcohol/week: 0.0 standard drinks of alcohol     Comment: 4-6 times per year, socially    Drug use: No     PHYSICAL EXAM:   BP (!) 148/65 (BP Location: Left arm, Patient Position: Sitting, BP Method: Medium (Automatic))   Pulse (!) 53   Temp 98.2 °F (36.8 °C) (Oral)   Ht 5' 4" (1.626 m)   Wt 65 kg (143 lb 4.8 oz)   BMI 24.60 kg/m²   Constitutional:  Alert,   Well-appearing  In no distress.   Neurological: Normal speech  no focal findings  CN II - XII grossly intact.  Neuro completely intact   Psychiatric: Mood and affect appropriate and symmetric.   HEENT: Normocephalic / atraumatic  PERRLA  Midline trachea  Well-healed left cervical incision swelling erythema or drainage   Cardiac: Regular rate and rhythm.   Pulmonary: Normal pulmonary effort.   Abdomen: Soft, not distended.     Skin: Warm and well perfused.    Vascular:  Radial pulses are 2+   Extremities/  Musculoskeletal: No edema.        IMAGING:  Carotid duplex reveals a minimal " right carotid stenosis with a peak systolic velocity of 116 and diastolic of 27 on the right and no residual left carotid stenosis    5/22 Carotid duplex (cardiology) shows no significant right carotid stenosis, and a 70-80% left carotid stenosis with a peak velocity of 274, end-diastolic velocity of 69, ratio of 4.15    IMPRESSION:  1 month status post left carotid endarterectomy for high-grade asymptomatic progressive stenosis.  He is done well from this    PLAN:  1.  Follow-up in 11 months with a with carotid duplex sooner if clinically indicated      GROVER Cui III, MD, FACS  Professor and Chief, Vascular and Endovascular Surgery

## 2023-10-13 ENCOUNTER — TELEPHONE (OUTPATIENT)
Dept: INTERNAL MEDICINE | Facility: CLINIC | Age: 63
End: 2023-10-13

## 2023-10-13 DIAGNOSIS — E78.2 MIXED HYPERLIPIDEMIA: ICD-10-CM

## 2023-10-13 PROBLEM — J45.40 MODERATE PERSISTENT ASTHMA WITHOUT COMPLICATION: Status: ACTIVE | Noted: 2023-10-13

## 2023-10-13 RX ORDER — ROSUVASTATIN CALCIUM 5 MG/1
5 TABLET, COATED ORAL DAILY
Qty: 90 TABLET | Refills: 3 | Status: SHIPPED | OUTPATIENT
Start: 2023-10-13 | End: 2023-10-13 | Stop reason: SDUPTHER

## 2023-10-13 RX ORDER — ROSUVASTATIN CALCIUM 5 MG/1
5 TABLET, COATED ORAL DAILY
Qty: 90 TABLET | Refills: 3 | Status: SHIPPED | OUTPATIENT
Start: 2023-10-13 | End: 2024-02-26 | Stop reason: SINTOL

## 2023-10-13 NOTE — TELEPHONE ENCOUNTER
Please let him know that I re-started Lipitor.     He has atherosclerosis which led to plaque formation and blockage of the left carotid artery.  He needs to continue to take Lipitor to avoid more blockages.     Please make sure that he understands this.

## 2023-12-30 NOTE — TELEPHONE ENCOUNTER
----- Message from Janine Rodriguez sent at 8/28/2020  4:20 PM CDT -----  Contact: Nakul pyle [phone]  Type:  Needs Medical Advice    Who Called: Nakul pyle  Symptoms (please be specific):   How long has patient had these symptoms:   Pharmacy name and phone #:   Would the patient rather a call back or a response via [facility]? Call back  Best Call Back Number:  [phone]  Additional Information: Pt is requesting a call back from the nurse because he's waiting on a rx refill on this medication sildenafiL (VIAGRA) 100 MG tablet        6

## 2024-01-10 DIAGNOSIS — J45.40 MODERATE PERSISTENT ASTHMA WITHOUT COMPLICATION: ICD-10-CM

## 2024-01-10 RX ORDER — ALBUTEROL SULFATE 90 UG/1
AEROSOL, METERED RESPIRATORY (INHALATION)
Qty: 54 G | Refills: 1 | OUTPATIENT
Start: 2024-01-10

## 2024-01-10 RX ORDER — ALBUTEROL SULFATE 90 UG/1
2 AEROSOL, METERED RESPIRATORY (INHALATION) 4 TIMES DAILY PRN
Qty: 54 G | Refills: 1 | Status: SHIPPED | OUTPATIENT
Start: 2024-01-10

## 2024-01-10 NOTE — TELEPHONE ENCOUNTER
No care due was identified.  [facility] Embedded Care Due Messages. Reference number: [id number].   1/10/2024 4:42:33 PM CST

## 2024-01-10 NOTE — TELEPHONE ENCOUNTER
No care due was identified.  [facility] Embedded Care Due Messages. Reference number: [id number].   1/10/2024 2:52:28 PM CST

## 2024-01-10 NOTE — TELEPHONE ENCOUNTER
Refill Decision Note   Nakul Britton  is requesting a refill authorization.  Brief Assessment and Rationale for Refill:  Quick Discontinue     Medication Therapy Plan:  E-Prescribing Status: Receipt confirmed by Nathalia #[id number] (1/10/2024)      Comments:     Note composed:4:54 PM 01/10/2024

## 2024-01-28 NOTE — LETTER
[facility] V[facility] 5th Fl  [address]  [facility] [address]  Phone: [phone]  Fax: [phone] May 17, 2022      Zaida Ornelas MD  [address] [facility] [address]    Patient: Nakul Britton   MR Number: [id number]   YOB: 1960   Date of Visit: 5/17/2022     Dear Dr. Ornelas:    Thank you for referring Nakul Britton to me for evaluation. Below are the relevant portions of my assessment and plan of care.    If you have questions, please do not hesitate to call me. I look forward to following Nakul along with you.    Sincerely,        MAISHA Cui III, MD   Professor and Chief  Vascular and Endovascular Surgery  Vice-Chair, Department of Surgery  [facility]     
Yes

## 2024-02-26 ENCOUNTER — OFFICE VISIT (OUTPATIENT)
Dept: INTERNAL MEDICINE | Facility: CLINIC | Age: 64
End: 2024-02-26
Payer: MEDICARE

## 2024-02-26 VITALS
SYSTOLIC BLOOD PRESSURE: 160 MMHG | RESPIRATION RATE: 16 BRPM | WEIGHT: 143.75 LBS | DIASTOLIC BLOOD PRESSURE: 80 MMHG | OXYGEN SATURATION: 97 % | HEART RATE: 65 BPM | BODY MASS INDEX: 24.54 KG/M2 | HEIGHT: 64 IN | TEMPERATURE: 97 F

## 2024-02-26 DIAGNOSIS — E78.2 MIXED HYPERLIPIDEMIA: ICD-10-CM

## 2024-02-26 DIAGNOSIS — J45.40 MODERATE PERSISTENT ASTHMA WITHOUT COMPLICATION: ICD-10-CM

## 2024-02-26 DIAGNOSIS — I10 ESSENTIAL HYPERTENSION: Primary | ICD-10-CM

## 2024-02-26 DIAGNOSIS — J44.9 CHRONIC OBSTRUCTIVE PULMONARY DISEASE, UNSPECIFIED COPD TYPE: ICD-10-CM

## 2024-02-26 DIAGNOSIS — R07.9 INTERMITTENT CHEST PAIN: ICD-10-CM

## 2024-02-26 PROBLEM — I77.1 STENOSIS OF LEFT SUBCLAVIAN ARTERY: Status: ACTIVE | Noted: 2024-02-26

## 2024-02-26 LAB
OHS QRS DURATION: 102 MS
OHS QTC CALCULATION: 395 MS

## 2024-02-26 PROCEDURE — 99999 PR PBB SHADOW E&M-EST. PATIENT-LVL III: CPT | Mod: PBBFAC,,, | Performed by: INTERNAL MEDICINE

## 2024-02-26 PROCEDURE — 93010 ELECTROCARDIOGRAM REPORT: CPT | Mod: S$GLB,,, | Performed by: INTERNAL MEDICINE

## 2024-02-26 PROCEDURE — 99214 OFFICE O/P EST MOD 30 MIN: CPT | Mod: S$GLB,,, | Performed by: INTERNAL MEDICINE

## 2024-02-26 PROCEDURE — 93005 ELECTROCARDIOGRAM TRACING: CPT | Mod: S$GLB,,, | Performed by: INTERNAL MEDICINE

## 2024-02-26 RX ORDER — LOSARTAN POTASSIUM 50 MG/1
50 TABLET ORAL DAILY
Qty: 90 TABLET | Refills: 3 | Status: SHIPPED | OUTPATIENT
Start: 2024-02-26 | End: 2024-03-26 | Stop reason: SDUPTHER

## 2024-02-26 NOTE — PROGRESS NOTES
Subjective:     Nakul Britton is a 63 y.o. male who presents for   Chief Complaint   Patient presents with    Follow-up    Hypertension    Hyperlipidemia    Chest Pain       HPI    Hypertension: The patient has been taking medications as instructed, no medication side effects noted, no chest pain on exertion, no dyspnea on exertion, and no swelling of ankles. Currently takes losartan 25mg daily.    BP Readings from Last 3 Encounters:   02/26/24 (!) 160/80   10/10/23 (!) 148/65   09/12/23 135/60       Hyperlipidemia: Compliance with treatment has been inadequate. The patient exercises never. Patient denies muscle pain associated with his medications. Previous history of cardiac disease includes:  carotid artery stenosis, s/p CEA .    He reports lightheadedness, chest pain and headaches on the days that he was taking Crestor.    Lab Results   Component Value Date    CHOL 212 (H) 02/27/2024    HDL 44 02/27/2024        Chest Pain: Onset was  several  weeks ago. Symptoms have improved since that time. The patient describes the pain as dull and does not radiate. He associates it with use of Crestor.  Associated symptoms are: none. Patient's cardiac risk factors are: advanced age (older than 55 for men, 65 for women), dyslipidemia, hypertension, and male gender.         Review of Systems   Constitutional:  Negative for activity change, chills, diaphoresis, fever and unexpected weight change.   HENT:  Negative for congestion, ear pain, hearing loss, rhinorrhea, sinus pressure and trouble swallowing.    Eyes:  Negative for discharge, redness and visual disturbance.   Respiratory:  Negative for cough, chest tightness, shortness of breath and wheezing.    Cardiovascular:  Positive for chest pain (that he associates with taking Crestor). Negative for palpitations and leg swelling.   Gastrointestinal:  Negative for abdominal pain, blood in stool, constipation, diarrhea, nausea and vomiting.        Denies frequent  heartburn   Endocrine: Negative for polydipsia and polyuria.   Genitourinary:  Negative for difficulty urinating, dysuria, hematuria and urgency.   Musculoskeletal:  Negative for arthralgias, joint swelling, myalgias and neck pain.   Skin:  Negative for rash and wound.   Neurological:  Negative for dizziness, tremors, weakness and headaches.   Psychiatric/Behavioral:  Negative for confusion and dysphoric mood.         Answers submitted by the patient for this visit:  Review of Systems Questionnaire (Submitted on 2/19/2024)  activity change: No  unexpected weight change: No  neck pain: No  hearing loss: No  rhinorrhea: No  trouble swallowing: No  eye discharge: No  visual disturbance: No  chest tightness: No  wheezing: No  chest pain: No  palpitations: No  blood in stool: No  constipation: No  vomiting: No  diarrhea: No  polydipsia: No  polyuria: No  difficulty urinating: No  urgency: No  hematuria: No  joint swelling: No  arthralgias: No  headaches: No  weakness: No  confusion: No  dysphoric mood: No      Objective:     Physical Exam  Constitutional:       General: He is awake.      Appearance: Normal appearance. He is well-developed and well-groomed.   HENT:      Head: Normocephalic and atraumatic.      Right Ear: Hearing and external ear normal.      Left Ear: Hearing and external ear normal.      Nose: Nose normal. No congestion.      Mouth/Throat:      Mouth: Mucous membranes are moist.   Eyes:      General: Lids are normal. Vision grossly intact.      Conjunctiva/sclera:      Right eye: No hemorrhage.     Left eye: No hemorrhage.     Pupils: Pupils are equal, round, and reactive to light.   Cardiovascular:      Rate and Rhythm: Normal rate and regular rhythm.      Heart sounds: No murmur heard.  Pulmonary:      Effort: Pulmonary effort is normal. No respiratory distress.      Breath sounds: Normal breath sounds. No decreased breath sounds or wheezing.   Abdominal:      General: Bowel sounds are normal. There is  no distension.   Musculoskeletal:         General: Normal range of motion.      Cervical back: Normal range of motion.      Right lower leg: No edema.      Left lower leg: No edema.   Skin:     General: Skin is warm and dry.   Neurological:      Mental Status: He is alert and oriented to person, place, and time.   Psychiatric:         Attention and Perception: Attention normal.         Mood and Affect: Mood normal.         Behavior: Behavior is cooperative.            Assessment:      1. Essential hypertension    2. Intermittent chest pain    3. Moderate persistent asthma without complication    4. Mixed hyperlipidemia    5. Chronic obstructive pulmonary disease, unspecified COPD type           Plan:     1. Essential hypertension  - increase losartan to 50mg/d, monitor BP regularly  - CBC Auto Differential; Future  - Comprehensive Metabolic Panel; Future  - losartan (COZAAR) 50 MG tablet; Take 1 tablet (50 mg total) by mouth once daily.  Dispense: 90 tablet; Refill: 3    2. Intermittent chest pain  - IN OFFICE EKG 12-LEAD (to Muse)    3. Moderate persistent asthma without complication  - stable, f/u with Pulmonology prn    4. Mixed hyperlipidemia  - stop Crestor for now, check labs  - discussed the importance of using a statin  - Comprehensive Metabolic Panel; Future  - Lipid Panel; Future    5. Stenosis of left subclavian artery  - seen on imaging done by the vascular surgeon, will monitor    6. Chronic obstructive pulmonary disease, unspecified COPD type  - stable, continue Adviar BID    RTC in August 2024 for annual exam or sooner if needed    __________________________    Zaida Ornelas MD, PharmD  [facility]  American Board of Obesity Medicine diplomate  Office [phone]

## 2024-02-27 ENCOUNTER — LAB VISIT (OUTPATIENT)
Dept: LAB | Facility: HOSPITAL | Age: 64
End: 2024-02-27
Attending: INTERNAL MEDICINE
Payer: MEDICARE

## 2024-02-27 DIAGNOSIS — I10 ESSENTIAL HYPERTENSION: ICD-10-CM

## 2024-02-27 DIAGNOSIS — E78.2 MIXED HYPERLIPIDEMIA: ICD-10-CM

## 2024-02-27 LAB
ALBUMIN SERPL BCP-MCNC: 4 G/DL (ref 3.5–5.2)
ALP SERPL-CCNC: 83 U/L (ref 55–135)
ALT SERPL W/O P-5'-P-CCNC: 21 U/L (ref 10–44)
ANION GAP SERPL CALC-SCNC: 9 MMOL/L (ref 8–16)
AST SERPL-CCNC: 20 U/L (ref 10–40)
BASOPHILS # BLD AUTO: 0.04 K/UL (ref 0–0.2)
BASOPHILS NFR BLD: 0.7 % (ref 0–1.9)
BILIRUB SERPL-MCNC: 0.3 MG/DL (ref 0.1–1)
BUN SERPL-MCNC: 17 MG/DL (ref 8–23)
CALCIUM SERPL-MCNC: 9.9 MG/DL (ref 8.7–10.5)
CHLORIDE SERPL-SCNC: 107 MMOL/L (ref 95–110)
CHOLEST SERPL-MCNC: 212 MG/DL (ref 120–199)
CHOLEST/HDLC SERPL: 4.8 {RATIO} (ref 2–5)
CO2 SERPL-SCNC: 25 MMOL/L (ref 23–29)
CREAT SERPL-MCNC: 0.8 MG/DL (ref 0.5–1.4)
DIFFERENTIAL METHOD BLD: ABNORMAL
EOSINOPHIL # BLD AUTO: 0.2 K/UL (ref 0–0.5)
EOSINOPHIL NFR BLD: 2.5 % (ref 0–8)
ERYTHROCYTE [DISTWIDTH] IN BLOOD BY AUTOMATED COUNT: 14.8 % (ref 11.5–14.5)
EST. GFR  (NO RACE VARIABLE): >60 ML/MIN/1.73 M^2
GLUCOSE SERPL-MCNC: 102 MG/DL (ref 70–110)
HCT VFR BLD AUTO: 47.4 % (ref 40–54)
HDLC SERPL-MCNC: 44 MG/DL (ref 40–75)
HDLC SERPL: 20.8 % (ref 20–50)
HGB BLD-MCNC: 15.1 G/DL (ref 14–18)
IMM GRANULOCYTES # BLD AUTO: 0.02 K/UL (ref 0–0.04)
IMM GRANULOCYTES NFR BLD AUTO: 0.3 % (ref 0–0.5)
LDLC SERPL CALC-MCNC: 142 MG/DL (ref 63–159)
LYMPHOCYTES # BLD AUTO: 1.7 K/UL (ref 1–4.8)
LYMPHOCYTES NFR BLD: 27.9 % (ref 18–48)
MCH RBC QN AUTO: 28.9 PG (ref 27–31)
MCHC RBC AUTO-ENTMCNC: 31.9 G/DL (ref 32–36)
MCV RBC AUTO: 91 FL (ref 82–98)
MONOCYTES # BLD AUTO: 0.6 K/UL (ref 0.3–1)
MONOCYTES NFR BLD: 10.5 % (ref 4–15)
NEUTROPHILS # BLD AUTO: 3.5 K/UL (ref 1.8–7.7)
NEUTROPHILS NFR BLD: 58.1 % (ref 38–73)
NONHDLC SERPL-MCNC: 168 MG/DL
NRBC BLD-RTO: 0 /100 WBC
PLATELET # BLD AUTO: 252 K/UL (ref 150–450)
PMV BLD AUTO: 10 FL (ref 9.2–12.9)
POTASSIUM SERPL-SCNC: 5.1 MMOL/L (ref 3.5–5.1)
PROT SERPL-MCNC: 7.5 G/DL (ref 6–8.4)
RBC # BLD AUTO: 5.22 M/UL (ref 4.6–6.2)
SODIUM SERPL-SCNC: 141 MMOL/L (ref 136–145)
TRIGL SERPL-MCNC: 130 MG/DL (ref 30–150)
WBC # BLD AUTO: 5.99 K/UL (ref 3.9–12.7)

## 2024-02-27 PROCEDURE — 85025 COMPLETE CBC W/AUTO DIFF WBC: CPT | Performed by: INTERNAL MEDICINE

## 2024-02-27 PROCEDURE — 36415 COLL VENOUS BLD VENIPUNCTURE: CPT | Mod: PO | Performed by: INTERNAL MEDICINE

## 2024-02-27 PROCEDURE — 80061 LIPID PANEL: CPT | Performed by: INTERNAL MEDICINE

## 2024-02-27 PROCEDURE — 80053 COMPREHEN METABOLIC PANEL: CPT | Performed by: INTERNAL MEDICINE

## 2024-03-04 ENCOUNTER — PATIENT MESSAGE (OUTPATIENT)
Dept: VASCULAR SURGERY | Facility: CLINIC | Age: 64
End: 2024-03-04
Payer: MEDICARE

## 2024-03-04 ENCOUNTER — PATIENT MESSAGE (OUTPATIENT)
Dept: CARDIOLOGY | Facility: CLINIC | Age: 64
End: 2024-03-04
Payer: MEDICARE

## 2024-03-04 ENCOUNTER — PATIENT OUTREACH (OUTPATIENT)
Dept: ADMINISTRATIVE | Facility: HOSPITAL | Age: 64
End: 2024-03-04
Payer: MEDICARE

## 2024-03-04 NOTE — PROGRESS NOTES
Population Health Chart Review & Patient Outreach Details      Further Action Needed If Patient Returns Outreach:        Health Maintenance Due   Topic Date Due    HIV Screening  Never done    High Dose Statin  Never done    Shingles Vaccine (1 of 2) Never done    Pneumococcal Vaccines (Age 65+) (2 of 2 - PCV) 2018    RSV Vaccine (Age 60+ and Pregnant patients) (1 - 1-dose 60+ series) Never done    COVID-19 Vaccine (24 season) 2023         Updates Requested / Reviewed:     [x]  Care Everywhere    []     []  External Sources ([facility], [facility], DIS, etc.)    [] [facility]   [] [facility]   [] Other:    []  Care Team Updated   []  Removed  or Duplicate Orders   [x]  Immunization Reconciliation Completed / Queried    [] [place]   [] [place]   [] [place]   [] [place]      Health Maintenance Topics Addressed and Outreach Outcomes / Actions Taken:             Breast Cancer Screening []  Mammogram Order Placed    []  Mammogram Screening Scheduled    []  External Records Requested & Care Team Updated if Applicable    []  External Records Uploaded & Care Team Updated if Applicable    []  Pt Declined Scheduling Mammogram    []  Pt Will Schedule with External Provider / Order Routed & Care Team Updated if Applicable              Cervical Cancer Screening []  Pap Smear Scheduled in Primary Care or OBGYN    []  External Records Requested & Care Team Updated if Applicable       []  External Records Uploaded, Care Team Updated, & History Updated if Applicable    []  Patient Declined Scheduling Pap Smear    []  Patient Will Schedule with External Provider & Care Team Updated if Applicable                  Colorectal Cancer Screening []  Colonoscopy Case Request / Referral / Home Test Order Placed    []  External Records Requested & Care Team Updated if Applicable    []  External Records Uploaded, Care Team Updated, & History Updated if Applicable    []  Patient Declined Completing Colon Cancer  Screening    []  Patient Will Schedule with External Provider & Care Team Updated if Applicable    []  Fit Kit Mailed (add the SmartPhrase under additional notes)    []  Reminded Patient to Complete Home Test                Diabetic Eye Exam []  Eye Exam Screening Order Placed    []  Eye Camera Scheduled or Optometry/Ophthalmology Referral Placed    []  External Records Requested & Care Team Updated if Applicable    []  External Records Uploaded, Care Team Updated, & History Updated if Applicable    []  Patient Declined Scheduling Eye Exam    []  Patient Will Schedule with External Provider & Care Team Updated if Applicable             Blood Pressure Control []  Primary Care Follow Up Visit Scheduled     []  Remote Blood Pressure Reading Captured    []  Patient Declined Remote Reading or Scheduling Appt - Escalated to PCP    [x]  Patient Will Call Back or Send Portal Message with Reading                 HbA1c & Other Labs []  Overdue Lab(s) Ordered    []  Overdue Lab(s) Scheduled    []  External Records Uploaded & Care Team Updated if Applicable    []  Primary Care Follow Up Visit Scheduled     []  Reminded Patient to Complete A1c Home Test    []  Patient Declined Scheduling Labs or Will Call Back to Schedule    []  Patient Will Schedule with External Provider / Order Routed, & Care Team Updated if Applicable           Primary Care Appointment []  Primary Care Appt Scheduled    []  Patient Declined Scheduling or Will Call Back to Schedule    []  Pt Established with External Provider, Updated Care Team, & Informed Pt to Notify Payor if Applicable           Medication Adherence /    Statin Use []  Primary Care Appointment Scheduled    []  Patient Reminded to  Prescription    []  Patient Declined, Provider Notified if Needed    []  Sent Provider Message to Review to Evaluate Pt for Statin, Add Exclusion Dx Codes, Document   Exclusion in Problem List, Change Statin Intensity Level to Moderate or High Intensity if  Applicable                Osteoporosis Screening []  Dexa Order Placed    []  Dexa Appointment Scheduled    []  External Records Requested & Care Team Updated    []  External Records Uploaded, Care Team Updated, & History Updated if Applicable    []  Patient Declined Scheduling Dexa or Will Call Back to Schedule    []  Patient Will Schedule with External Provider / Order Routed & Care Team Updated if Applicable       Additional Notes:

## 2024-03-26 ENCOUNTER — CLINICAL SUPPORT (OUTPATIENT)
Dept: INTERNAL MEDICINE | Facility: CLINIC | Age: 64
End: 2024-03-26
Payer: MEDICARE

## 2024-03-26 ENCOUNTER — PATIENT MESSAGE (OUTPATIENT)
Dept: INTERNAL MEDICINE | Facility: CLINIC | Age: 64
End: 2024-03-26

## 2024-03-26 VITALS
HEART RATE: 66 BPM | HEIGHT: 64 IN | TEMPERATURE: 98 F | BODY MASS INDEX: 24.5 KG/M2 | OXYGEN SATURATION: 99 % | DIASTOLIC BLOOD PRESSURE: 60 MMHG | RESPIRATION RATE: 20 BRPM | SYSTOLIC BLOOD PRESSURE: 150 MMHG | WEIGHT: 143.5 LBS

## 2024-03-26 VITALS — DIASTOLIC BLOOD PRESSURE: 70 MMHG | SYSTOLIC BLOOD PRESSURE: 125 MMHG

## 2024-03-26 DIAGNOSIS — I10 ESSENTIAL HYPERTENSION: ICD-10-CM

## 2024-03-26 DIAGNOSIS — I10 HYPERTENSION, UNSPECIFIED TYPE: Primary | ICD-10-CM

## 2024-03-26 PROCEDURE — 99999 PR PBB SHADOW E&M-EST. PATIENT-LVL III: CPT | Mod: PBBFAC,,,

## 2024-03-26 RX ORDER — LOSARTAN POTASSIUM 100 MG/1
100 TABLET ORAL DAILY
Qty: 90 TABLET | Refills: 1 | Status: SHIPPED | OUTPATIENT
Start: 2024-03-26 | End: 2024-06-10 | Stop reason: SDUPTHER

## 2024-03-26 RX ORDER — LOSARTAN POTASSIUM 100 MG/1
100 TABLET ORAL DAILY
Qty: 90 TABLET | Refills: 1 | Status: SHIPPED | OUTPATIENT
Start: 2024-03-26 | End: 2024-03-26 | Stop reason: SDUPTHER

## 2024-03-26 NOTE — TELEPHONE ENCOUNTER
Increase losartan to 100mg daily. Monitor BP for the next few days and call us with readings.    The blood counts have returned to normal.    Kidney function and liver function tests are normal.     The total cholesterol and LDL cholesterol levels have both increased. There is one other medication that will lower cholesterol level and should not cause as many side effects. Would be try Pravachol?

## 2024-03-26 NOTE — TELEPHONE ENCOUNTER
No care due was identified.  [facility] Embedded Care Due Messages. Reference number: [id number].   3/26/2024 3:22:58 PM CDT

## 2024-03-26 NOTE — PROGRESS NOTES
Pt here for blood pressure check right arm 150/60, states he is on antibiotics at this time because of a cold.

## 2024-03-27 ENCOUNTER — PATIENT MESSAGE (OUTPATIENT)
Dept: INTERNAL MEDICINE | Facility: CLINIC | Age: 64
End: 2024-03-27
Payer: MEDICARE

## 2024-03-27 ENCOUNTER — PATIENT MESSAGE (OUTPATIENT)
Dept: CARDIOLOGY | Facility: CLINIC | Age: 64
End: 2024-03-27
Payer: MEDICARE

## 2024-03-27 DIAGNOSIS — J45.40 MODERATE PERSISTENT ASTHMA WITHOUT COMPLICATION: ICD-10-CM

## 2024-03-27 DIAGNOSIS — E78.2 MIXED HYPERLIPIDEMIA: Primary | ICD-10-CM

## 2024-03-27 RX ORDER — ROSUVASTATIN CALCIUM 5 MG/1
5 TABLET, COATED ORAL DAILY
Qty: 90 TABLET | Refills: 3 | Status: SHIPPED | OUTPATIENT
Start: 2024-03-27 | End: 2025-03-27

## 2024-03-27 RX ORDER — ROSUVASTATIN CALCIUM 5 MG/1
5 TABLET, COATED ORAL DAILY
Qty: 90 TABLET | Refills: 3 | Status: SHIPPED | OUTPATIENT
Start: 2024-03-27 | End: 2024-03-27 | Stop reason: SDUPTHER

## 2024-03-27 NOTE — TELEPHONE ENCOUNTER
Noted.     Stopped Crestor at last appointment so no longer on med list. Will re-order Crestor 5mg daily.

## 2024-03-27 NOTE — TELEPHONE ENCOUNTER
No care due was identified.  [facility] Embedded Care Due Messages. Reference number: [id number].   3/27/2024 12:10:31 PM CDT

## 2024-03-28 RX ORDER — ALBUTEROL SULFATE 90 UG/1
AEROSOL, METERED RESPIRATORY (INHALATION)
Qty: 54 G | Refills: 3 | Status: SHIPPED | OUTPATIENT
Start: 2024-03-28

## 2024-03-28 NOTE — TELEPHONE ENCOUNTER
Refill Decision Note   Nakul Britton  is requesting a refill authorization.  Brief Assessment and Rationale for Refill:  Approve     Medication Therapy Plan:         Comments:     Note composed:2:31 AM 03/28/2024

## 2024-06-10 DIAGNOSIS — I10 ESSENTIAL HYPERTENSION: ICD-10-CM

## 2024-06-10 DIAGNOSIS — N52.9 ERECTILE DYSFUNCTION, UNSPECIFIED ERECTILE DYSFUNCTION TYPE: ICD-10-CM

## 2024-06-10 RX ORDER — SILDENAFIL 100 MG/1
TABLET, FILM COATED ORAL
Qty: 30 TABLET | Refills: 5 | Status: SHIPPED | OUTPATIENT
Start: 2024-06-10

## 2024-06-10 RX ORDER — LOSARTAN POTASSIUM 100 MG/1
100 TABLET ORAL DAILY
Qty: 90 TABLET | Refills: 2 | Status: SHIPPED | OUTPATIENT
Start: 2024-06-10

## 2024-06-10 RX ORDER — LOSARTAN POTASSIUM 50 MG/1
50 TABLET ORAL DAILY
Qty: 90 TABLET | Refills: 3 | OUTPATIENT
Start: 2024-06-10

## 2024-06-10 NOTE — TELEPHONE ENCOUNTER
No care due was identified.  [facility] Embedded Care Due Messages. Reference number: [id number].   6/10/2024 8:19:17 AM CDT

## 2024-06-10 NOTE — TELEPHONE ENCOUNTER
----- Message from Kirsten Henderson sent at 6/10/2024 11:08 AM CDT -----  Contact: [phone]  Colette mckinnon is calling in regards to a question  for the script that was sent for the pt Losartan please advise

## 2024-06-10 NOTE — TELEPHONE ENCOUNTER
Refill Routing Note   Medication(s) are not appropriate for processing by [facility] for the following reason(s):        Due for refill >6 months ago    [facility] action(s):  Defer  Approve               Appointments  past 12m or future 3m with PCP    Date Provider   Last Visit   2/26/2024 Zaida Ornelas MD   Next Visit   6/10/2024 Zaida Ornelas MD   ED visits in past 90 days: 0        Note composed:10:50 AM 06/10/2024

## 2024-06-10 NOTE — TELEPHONE ENCOUNTER
No care due was identified.  Health [facility] Embedded Care Due Messages. Reference number: [id number].   6/10/2024 10:44:56 AM CDT

## 2024-06-10 NOTE — TELEPHONE ENCOUNTER
Refill Decision Note  Quick DC. Request already responded to by other means (e.g. phone or fax)      Nakul Britton  is requesting a refill authorization.  Brief Assessment and Rationale for Refill:  Quick Discontinue     Medication Therapy Plan:         Comments:     Note composed:10:50 AM 06/10/2024

## 2024-06-11 ENCOUNTER — TELEPHONE (OUTPATIENT)
Dept: INTERNAL MEDICINE | Facility: CLINIC | Age: 64
End: 2024-06-11
Payer: MEDICARE

## 2024-06-11 NOTE — TELEPHONE ENCOUNTER
----- Message from Ivory Wilkinson MA sent at 6/11/2024  1:37 PM CDT -----  Is 100 mg of losartan the correct dosage?  ----- Message -----  From: Keely Barcenas  Sent: 6/11/2024  12:03 PM CDT  To: Ceci WORTHINGTON Staff    .Type:  Pharmacy Calling to Clarify an RX    Pharmacy Name:    Prescription Name: losartan (COZAAR)     What do they need to clarify?: PLEASE REACH OUT TO THE PHARMACY TO CLARIFY DOSAGE. PHARMACY RECEIVED TWO DIFFERENT PRESCRIPTIONS     Best Call Back Number: [phone]    Additional Information: THANK YOU

## 2024-06-17 DIAGNOSIS — J45.40 MODERATE PERSISTENT ASTHMA WITHOUT COMPLICATION: ICD-10-CM

## 2024-06-17 RX ORDER — MONTELUKAST SODIUM 10 MG/1
10 TABLET ORAL NIGHTLY
Qty: 90 TABLET | Refills: 1 | OUTPATIENT
Start: 2024-06-17

## 2024-06-17 RX ORDER — MONTELUKAST SODIUM 10 MG/1
10 TABLET ORAL NIGHTLY
Qty: 90 TABLET | Refills: 2 | Status: SHIPPED | OUTPATIENT
Start: 2024-06-17

## 2024-06-17 NOTE — TELEPHONE ENCOUNTER
No care due was identified.  [facility] Embedded Care Due Messages. Reference number: [id number].   6/17/2024 9:48:22 AM CDT

## 2024-06-17 NOTE — TELEPHONE ENCOUNTER
Refill Decision Note   Nakul Britton  is requesting a refill authorization.  Brief Assessment and Rationale for Refill:  Quick Discontinue     Medication Therapy Plan:         Comments:     Note composed:2:23 PM 06/17/2024             within normal limits

## 2024-06-17 NOTE — TELEPHONE ENCOUNTER
No care due was identified.  [facility] Embedded Care Due Messages. Reference number: [id number].   6/17/2024 8:13:52 AM CDT

## 2024-06-17 NOTE — TELEPHONE ENCOUNTER
Refill Decision Note   Nakul Britton  is requesting a refill authorization.  Brief Assessment and Rationale for Refill:  Approve     Medication Therapy Plan:        Comments:     Note composed:2:22 PM 06/17/2024

## 2024-09-06 DIAGNOSIS — I10 ESSENTIAL HYPERTENSION: ICD-10-CM

## 2024-09-06 RX ORDER — LOSARTAN POTASSIUM 100 MG/1
100 TABLET ORAL
Qty: 90 TABLET | Refills: 1 | Status: SHIPPED | OUTPATIENT
Start: 2024-09-06

## 2024-09-06 NOTE — TELEPHONE ENCOUNTER
Refill Authorization Note     Refill Decision Note   Nakul Britton  is requesting a refill authorization.  Brief Assessment and Rationale for Refill:  Approve     Medication Therapy Plan:       Medication Reconciliation Completed: No   Comments:     No Care Gaps recommended.     Note composed:2:47 PM 09/06/2024

## 2024-09-06 NOTE — TELEPHONE ENCOUNTER
No care due was identified.  [facility] Embedded Care Due Messages. Reference number: [id number].   9/06/2024 9:52:08 AM CDT

## 2024-11-07 ENCOUNTER — TELEPHONE (OUTPATIENT)
Dept: PULMONOLOGY | Facility: CLINIC | Age: 64
End: 2024-11-07
Payer: MEDICARE

## 2024-11-07 NOTE — TELEPHONE ENCOUNTER
----- Message from Leanne sent at 10/22/2024 11:45 AM CDT -----  Contact: Self [phone]  Requesting an RX refill or new RX.    Is this a refill or new RX: refill    RX name and strength (copy/paste from chart):  predniSONE (DELTASONE) 10 MG tablet     Is this a 30 day or 90 day RX: 30    Pharmacy name and phone # (copy/paste from chart):      [id number] - [place] [address] AT [facility] & [address]  [place] [address]  Phone: [phone] Fax: [phone]

## 2024-11-27 ENCOUNTER — TELEPHONE (OUTPATIENT)
Dept: PULMONOLOGY | Facility: CLINIC | Age: 64
End: 2024-11-27
Payer: MEDICARE

## 2024-11-27 NOTE — TELEPHONE ENCOUNTER
Spoke with pt, informed her that I have received her message. Pt states that he will like to get a refill on medication (Prednisone). I advised pt that he has to schedule appointment with our dept or he can contact his PCP being that he has nit been seen in clinic since April 2023. Pt states that he will like to schedule appointment. I advised pt that I can schedule his appointment on 12/6/2024 for 1:00 pm. Pt verbalized that he understand and accepted the appointment.

## 2024-11-27 NOTE — TELEPHONE ENCOUNTER
----- Message from Debbie sent at 11/27/2024  4:05 PM CST -----  Regarding: Prescription  Contact: [phone]  Type:  RX Refill Request    Who Called: Nakul Britton  Refill or New Rx:refill  RX Name and Strength:predniSONE (DELTASONE) 10 MG tablet  How is the patient currently taking it? (ex. 1XDay):prn  Is this a 30 day or 90 day RX:30  Preferred Pharmacy with phone number:  [id number] - [address] AT [facility] & [address] [address]  Phone: [phone] Fax: [phone]  Local or Mail Order:local  Ordering Provider:John Ayala M.D.  Would the patient rather a call back or a response via [facility]? call  Best Call Back Number:[phone]    Additional Information: out of medication

## 2024-12-04 ENCOUNTER — TELEPHONE (OUTPATIENT)
Dept: CRITICAL CARE MEDICINE | Facility: HOSPITAL | Age: 64
End: 2024-12-04
Payer: MEDICARE

## 2024-12-04 RX ORDER — PREDNISONE 10 MG/1
40 TABLET ORAL DAILY
Qty: 20 TABLET | Refills: 0 | Status: SHIPPED | OUTPATIENT
Start: 2024-12-04 | End: 2024-12-09

## 2024-12-04 NOTE — TELEPHONE ENCOUNTER
Spoke to patient regarding prednisone, he reports asthma has been well-controlled for the past two years and has not required any prednisone. Started to having worsening symptoms with the change in weather a week ago, took some prednisone with improvement. Will refill prednisone after discussion with patient so he has on hand in case he has worsening symptoms and see in clinic on 11/6.     Ngozi Reyes MD  PCCM Fellow

## 2024-12-04 NOTE — ASSESSMENT & PLAN NOTE
PFTs from 2023 show evidence of moderate fixed obstruction suspect secondary to long-standing asthma. His asthma symptoms are generally well controlled although does report worsening every fall prior to the change in seasons. Discussed at length general recommendations regarding asthma control and the idea with maintenance inhalers in preventing asthma flares requiring prednisone and hospital admission. He will try using inhalers consistently as we discussed that after a flare would like to ensure his symptoms are well-controlled and he is on maintenance therapy. Discussed that given minimal to no symptoms in the summer, it may be reasonable to hold on maintenance inhalers during the summer months but would restart in the fall given consistently worsening symptoms in the fall. Asthma symptoms have been well-controlled with rare exacerbations. No peripheral eosinophilia, will check IgE next time patient has lab work done.     - start advair BID  - continue montelukast daily  - continue albuterol PRN  - prednisone course prescribed to have on hand for worsening symptoms, patient instructed to send my chart message or reach out to clinic if having to use prednisone  - will check in with patient in about a month to see how his symptoms are with consistent inhaler use    [facility] 9/2025

## 2024-12-04 NOTE — PROGRESS NOTES
[facility]    SUBJECTIVE:     Chief Complaint: asthma follow-up    History of Present Illness:  Nakul Britton is a 64 y.o. male with a history of long-standing asthma who presents for clinic follow-up. Last seen in clinic in 4/2023. During clinic visit in 4/2023 reported symptoms generally well-controlled with worsening in the fall/with change of seasons.     He recurrently reports that he overall feels like his asthma is well-controlled. He did have worsening symptoms described as chest tightness and shortness of breath that did not respond to inhalers about two weeks prior to clinic, took a short course of steroids for about 4 days (tapered down from 30mg over the course of 4 days). Breathing now back at baseline.     He reports that given well-controlled symptoms he typically uses his inhalers a few times per week, more often in the winter months and very rarely in the summer. Does report consistently worsening of symptoms in the fall. Used prednisone in 11/2024, had not used in the two years preceding that. Uses advair and albuterol together, uses albuterol as a rescue inhaler a few times per year. Does have minor allergies mostly to dust and some sinus congestion that is overall well-controlled. Occasionally uses claritin. Denies any nocturnal dyspnea, no chronic cough, and no respiratory symptoms that limit his activity level. Very active at work.     Last hospitalized for asthma exacerbation in his 20s.     Smoking: never  Other substances: never  Inhalers: advair and albuterol  Incarceration/homelessness: none  Occupational/environmental exposures: previous , currently washes windows twice a week  Pets/hobbies: none  Intubation Hx: none  Hx childhood asthma/atopy: none  Family Hx: no history of asthma     Review of patient's allergies indicates:   Allergen Reactions    Center-al house dust Shortness Of Breath    House dust mite Shortness Of Breath    Olive oil Rash  "      Past Medical History:   Diagnosis Date    Allergy     Asthma     Asthma 4/21/2023    Essential hypertension 6/17/2022    Hiatal hernia      Past Surgical History:   Procedure Laterality Date    CAROTID ENDARTERECTOMY Left 9/11/2023    Procedure: ENDARTERECTOMY-CAROTID;  Surgeon: MAISHA Cui III, MD;  Location: [facility] OR [address] [place] [address];  Service: Vascular;  Laterality: Left;    CATARACT EXTRACTION       Family History   Problem Relation Name Age of Onset    No Known Problems Mother      No Known Problems Father       Social History     Socioeconomic History    Marital status: Single   Tobacco Use    Smoking status: Never    Smokeless tobacco: Never   Substance and Sexual Activity    Alcohol use: Yes     Alcohol/week: 0.0 standard drinks of alcohol     Comment: 4-6 times per year, socially    Drug use: No    Sexual activity: Yes     Partners: Female   Social History Narrative    Works as a window washer       OBJECTIVE:     Vital Signs  Vitals:    12/06/24 1308   BP: 138/78   BP Location: Right arm   Patient Position: Sitting   Pulse: 82   SpO2: 99%   Weight: 66.5 kg (146 lb 9.7 oz)   Height: 5' 4" (1.626 m)     Body mass index is 25.16 kg/m².    Physical Exam:  GEN: well-appearing, sitting in chair comfortably   HEENT: face symmetric, conjunctivae anicteric  CV: regular rhythm, normal rate, no audible murmurs  PULM: CTAB, no wheezing, crackles, no increased WOB  Abd: no abdominal distension   Ext: no LE edema  MSK: moving all extremities spontaneously   Skin: no rashes  Neuro: alert, answering questions appropriately     Laboratory:  CBC  Lab Results   Component Value Date    WBC 5.99 02/27/2024    HGB 15.1 02/27/2024    HCT 47.4 02/27/2024     02/27/2024    MCV 91 02/27/2024    RDW 14.8 (H) 02/27/2024     BMP  Lab Results   Component Value Date     02/27/2024    K 5.1 02/27/2024     02/27/2024    CO2 25 02/27/2024    BUN 17 02/27/2024    CREATININE 0.8 02/27/2024     02/27/2024    CALCIUM " 9.9 02/27/2024    MG 2.1 09/12/2023    PHOS 3.2 09/12/2023     LFTs  Lab Results   Component Value Date    PROT 7.5 02/27/2024    ALBUMIN 4.0 02/27/2024    BILITOT 0.3 02/27/2024    AST 20 02/27/2024    ALKPHOS 83 02/27/2024    ALT 21 02/27/2024     Eosinophils 2/2024 - 0.2    PFTs 4/2023  FEV1/FVC 57  FEV1 1.62 (58%), 1% post bronchodilator change  FVC 2.85 (80%), 7% post bronchodilator change  TLC 5.65 (95%)  RV 2.65 (115%)  DLCO 78%    Chest Imaging, My Impression:   CXR 8/2023 - normal lung parenchyma    CXR 1/2021 - normal lung parenchyma     ASSESSMENT/PLAN:     Problem Noted   Moderate Persistent Asthma Without Complication 10/13/2023     Problem List Items Addressed This Visit          Pulmonary    Moderate persistent asthma without complication - Primary    Current Assessment & Plan     PFTs from 2023 show evidence of moderate fixed obstruction suspect secondary to long-standing asthma. His asthma symptoms are generally well controlled although does report worsening every fall prior to the change in seasons. Discussed at length general recommendations regarding asthma control and the idea with maintenance inhalers in preventing asthma flares requiring prednisone and hospital admission. He will try using inhalers consistently as we discussed that after a flare would like to ensure his symptoms are well-controlled and he is on maintenance therapy. Discussed that given minimal to no symptoms in the summer, it may be reasonable to hold on maintenance inhalers during the summer months but would restart in the fall given consistently worsening symptoms in the fall. Asthma symptoms have been well-controlled with rare exacerbations. No peripheral eosinophilia, will check IgE next time patient has lab work done.     - start advair BID  - continue montelukast daily  - continue albuterol PRN  - prednisone course prescribed to have on hand for worsening symptoms, patient instructed to send my chart message or reach out  to clinic if having to use prednisone  - will check in with patient in about a month to see how his symptoms are with consistent inhaler use    RT 9/2025            Ngozi Reyes MD  Pulmonary & Critical Care Fellow

## 2024-12-06 ENCOUNTER — PATIENT MESSAGE (OUTPATIENT)
Dept: PULMONOLOGY | Facility: CLINIC | Age: 64
End: 2024-12-06

## 2024-12-06 ENCOUNTER — OFFICE VISIT (OUTPATIENT)
Dept: PULMONOLOGY | Facility: CLINIC | Age: 64
End: 2024-12-06
Payer: MEDICARE

## 2024-12-06 VITALS
SYSTOLIC BLOOD PRESSURE: 138 MMHG | OXYGEN SATURATION: 99 % | WEIGHT: 146.63 LBS | DIASTOLIC BLOOD PRESSURE: 78 MMHG | HEART RATE: 82 BPM | HEIGHT: 64 IN | BODY MASS INDEX: 25.03 KG/M2

## 2024-12-06 DIAGNOSIS — J45.40 MODERATE PERSISTENT ASTHMA WITHOUT COMPLICATION: Primary | ICD-10-CM

## 2024-12-06 PROCEDURE — 99999 PR PBB SHADOW E&M-EST. PATIENT-LVL III: CPT | Mod: PBBFAC,GC,, | Performed by: STUDENT IN AN ORGANIZED HEALTH CARE EDUCATION/TRAINING PROGRAM

## 2024-12-12 DIAGNOSIS — J45.40 MODERATE PERSISTENT ASTHMA WITHOUT COMPLICATION: ICD-10-CM

## 2024-12-12 RX ORDER — MONTELUKAST SODIUM 10 MG/1
10 TABLET ORAL NIGHTLY
Qty: 90 TABLET | Refills: 0 | Status: SHIPPED | OUTPATIENT
Start: 2024-12-12

## 2024-12-12 NOTE — TELEPHONE ENCOUNTER
Care Due:                  Date            Visit Type   Department     Provider  --------------------------------------------------------------------------------                                MYCHART                              ANNUAL                              CHECKUP/PHY  MET INTERNAL  Last Visit: 02-      S            PRICILA Ornelas  Next Visit: None Scheduled  None         None Found                                                            Last  Test          Frequency    Reason                     Performed    Due Date  --------------------------------------------------------------------------------    CMP.........  12 months..  losartan, rosuvastatin...  02- 02-    Lipid Panel.  12 months..  rosuvastatin.............  02- 02-    [employer] Embedded Care Due Messages. Reference number: [id number].   12/12/2024 5:23:45 AM CST

## 2024-12-12 NOTE — TELEPHONE ENCOUNTER
Provider Staff:  Action required for this patient    Requires labs      Please see care gap opportunities below in Care Due Message.    Thanks!  [facility]     Appointments      Date Provider   Last Visit   2/26/2024 Zaida Ornelas MD   Next Visit   Visit date not found Zaida Ornelas MD     Refill Decision Note   Nakul Britton  is requesting a refill authorization.  Brief Assessment and Rationale for Refill:  Approve     Medication Therapy Plan:         Comments:     Note composed:9:41 AM 12/12/2024

## 2025-01-15 ENCOUNTER — TELEPHONE (OUTPATIENT)
Dept: PULMONOLOGY | Facility: CLINIC | Age: 65
End: 2025-01-15
Payer: MEDICARE

## 2025-01-15 DIAGNOSIS — J45.909 ASTHMA, UNSPECIFIED ASTHMA SEVERITY, UNSPECIFIED WHETHER COMPLICATED, UNSPECIFIED WHETHER PERSISTENT: ICD-10-CM

## 2025-01-15 NOTE — TELEPHONE ENCOUNTER
----- Message from Neela sent at 1/15/2025  3:23 PM CST -----  Regarding: refill  Contact: pt  Rx Refill/Request     Is this a Refill or New Rx:  New Rx    Rx Name and Strength:  fluticasone-salmeterol diskus inhaler 500-50 mcg    Preferred Pharmacy with phone number:   [id number] - [address] & [address] [address]  Phone: [phone] Fax: [phone]     Communication Preference:[phone] (home)      Additional Information: pt was last seen on 12/6/24 by Dr. Ngozi Reyes

## 2025-01-15 NOTE — TELEPHONE ENCOUNTER
Spoke with patient, informed him that I have received his message. I also advised pt that I will forward his medication refill request to Dr Reyes. Pt verbalized that he understand.

## 2025-01-16 RX ORDER — FLUTICASONE PROPIONATE AND SALMETEROL 500; 50 UG/1; UG/1
1 POWDER RESPIRATORY (INHALATION) 2 TIMES DAILY
Qty: 60 EACH | Refills: 11 | Status: SHIPPED | OUTPATIENT
Start: 2025-01-16 | End: 2026-01-16

## 2025-03-05 DIAGNOSIS — I10 ESSENTIAL HYPERTENSION: ICD-10-CM

## 2025-03-12 NOTE — TELEPHONE ENCOUNTER
Refill Routing Note   Medication(s) are not appropriate for processing by [facility] for the following reason(s):        Required labs outdated    [facility] action(s):  Defer   Requires appointment : Yes     Requires labs : Yes             Appointments  past 12m or future 3m with PCP    Date Provider   Last Visit   2/26/2024 Zaida Ornelas MD   Next Visit   Visit date not found Zaida Ornelas MD   ED visits in past 90 days: 0        Note composed:3:05 PM 03/12/2025

## 2025-03-12 NOTE — TELEPHONE ENCOUNTER
Care Due:                  Date            Visit Type   Department     Provider  --------------------------------------------------------------------------------                                MYCHART                              ANNUAL                              CHECKUP/PHY  MET INTERNAL  Last Visit: 02-      S            PRICILA Ornelas  Next Visit: None Scheduled  None         None Found                                                            Last  Test          Frequency    Reason                     Performed    Due Date  --------------------------------------------------------------------------------    Office Visit  15 months..  losartan, montelukast,     02- 05-                             rosuvastatin.............    CMP.........  12 months..  losartan, rosuvastatin...  02- 02-    Lipid Panel.  12 months..  rosuvastatin.............  02- 02-    [facility] Embedded Care Due Messages. Reference number: [id number].   3/12/2025 5:31:21 AM CDT

## 2025-03-14 RX ORDER — ROSUVASTATIN CALCIUM 5 MG/1
5 TABLET, COATED ORAL
Qty: 90 TABLET | Refills: 0 | OUTPATIENT
Start: 2025-03-14

## 2025-03-18 RX ORDER — ROSUVASTATIN CALCIUM 5 MG/1
5 TABLET, COATED ORAL DAILY
Qty: 30 TABLET | Refills: 0 | Status: SHIPPED | OUTPATIENT
Start: 2025-03-18 | End: 2025-04-17

## 2025-03-18 NOTE — TELEPHONE ENCOUNTER
No care due was identified.  [facility] Embedded Care Due Messages. Reference number: [id number].   3/18/2025 12:15:57 PM CDT

## 2025-03-20 DIAGNOSIS — J45.40 MODERATE PERSISTENT ASTHMA WITHOUT COMPLICATION: ICD-10-CM

## 2025-03-20 RX ORDER — MONTELUKAST SODIUM 10 MG/1
10 TABLET ORAL NIGHTLY
Qty: 90 TABLET | Refills: 0 | OUTPATIENT
Start: 2025-03-20

## 2025-03-20 NOTE — TELEPHONE ENCOUNTER
Refill Decision Note   Nakul Britton  is requesting a refill authorization.  Brief Assessment and Rationale for Refill:  Quick Discontinue     Medication Therapy Plan:         Comments:     Note composed:4:56 PM 03/20/2025

## 2025-03-20 NOTE — TELEPHONE ENCOUNTER
----- Message from Justice sent at 3/20/2025  2:08 PM CDT -----  Contact: [phone]@patient  Requesting an RX refill or new RX.montelukast (SINGULAIR) 10 mg tabletIs this a refill or new RX: refill RX name and strength (copy/paste from chart):  Is this a 30 day or 90 day RX:  90 day Pharmacy name and phone #  [id number] - [place] [place] [facility] [address] [facility] that we provide their patients with the following 2 reminders -- prescription refills can take up to 72 hours, and a friendly reminder that in the future you can use your [facility] account to request refills:

## 2025-03-20 NOTE — TELEPHONE ENCOUNTER
No care due was identified.  [facility] Embedded Care Due Messages. Reference number: [id number].   3/20/2025 2:14:06 PM CDT

## 2025-03-20 NOTE — TELEPHONE ENCOUNTER
No care due was identified.  [facility] Embedded Care Due Messages. Reference number: [id number].   3/20/2025 2:20:30 PM CDT

## 2025-03-22 RX ORDER — MONTELUKAST SODIUM 10 MG/1
10 TABLET ORAL NIGHTLY
Qty: 90 TABLET | Refills: 1 | Status: SHIPPED | OUTPATIENT
Start: 2025-03-22

## 2025-03-26 ENCOUNTER — OFFICE VISIT (OUTPATIENT)
Dept: INTERNAL MEDICINE | Facility: CLINIC | Age: 65
End: 2025-03-26
Payer: MEDICARE

## 2025-03-26 ENCOUNTER — PATIENT MESSAGE (OUTPATIENT)
Dept: INTERNAL MEDICINE | Facility: CLINIC | Age: 65
End: 2025-03-26
Payer: MEDICARE

## 2025-03-26 VITALS
RESPIRATION RATE: 18 BRPM | WEIGHT: 145.31 LBS | OXYGEN SATURATION: 98 % | TEMPERATURE: 97 F | HEIGHT: 64 IN | DIASTOLIC BLOOD PRESSURE: 82 MMHG | BODY MASS INDEX: 24.81 KG/M2 | HEART RATE: 67 BPM | SYSTOLIC BLOOD PRESSURE: 120 MMHG

## 2025-03-26 DIAGNOSIS — D22.9 CHANGE IN MOLE: ICD-10-CM

## 2025-03-26 DIAGNOSIS — Z00.00 ANNUAL PHYSICAL EXAM: ICD-10-CM

## 2025-03-26 DIAGNOSIS — I10 ESSENTIAL HYPERTENSION: Primary | ICD-10-CM

## 2025-03-26 DIAGNOSIS — J45.40 MODERATE PERSISTENT ASTHMA WITHOUT COMPLICATION: ICD-10-CM

## 2025-03-26 DIAGNOSIS — R73.02 IMPAIRED GLUCOSE TOLERANCE: ICD-10-CM

## 2025-03-26 DIAGNOSIS — Z12.11 SCREENING FOR COLORECTAL CANCER: ICD-10-CM

## 2025-03-26 DIAGNOSIS — J44.9 CHRONIC OBSTRUCTIVE PULMONARY DISEASE, UNSPECIFIED COPD TYPE: ICD-10-CM

## 2025-03-26 DIAGNOSIS — E78.2 MIXED HYPERLIPIDEMIA: ICD-10-CM

## 2025-03-26 DIAGNOSIS — Z12.12 SCREENING FOR COLORECTAL CANCER: ICD-10-CM

## 2025-03-26 PROCEDURE — 99999 PR PBB SHADOW E&M-EST. PATIENT-LVL IV: CPT | Mod: PBBFAC,,, | Performed by: INTERNAL MEDICINE

## 2025-03-26 RX ORDER — LOSARTAN POTASSIUM 100 MG/1
100 TABLET ORAL DAILY
Qty: 90 TABLET | Refills: 1 | Status: SHIPPED | OUTPATIENT
Start: 2025-03-26

## 2025-03-26 RX ORDER — ROSUVASTATIN CALCIUM 5 MG/1
5 TABLET, COATED ORAL DAILY
Qty: 90 TABLET | Refills: 1 | Status: SHIPPED | OUTPATIENT
Start: 2025-03-26

## 2025-03-26 NOTE — PROGRESS NOTES
Subjective:     PCP: Zaida Ornelas MD    Nakul Britton is a 64 y.o. male who presents for an annual exam.    Hypertension: The patient has been taking medications as instructed, no medication side effects noted, no chest pain on exertion, no dyspnea on exertion, and no swelling of ankles.    BP Readings from Last 3 Encounters:   03/26/25 120/82   12/06/24 138/78   03/26/24 125/70     Hyperlipidemia: Compliance with treatment has been adequate with a frequency modification--> able to tolerate Crestor 5mg QOD.  The patient exercises intermittently. Patient denies muscle pain associated with his medications. Previous history of cardiac disease includes: none.    Lab Results   Component Value Date    CHOL 212 (H) 02/27/2024    HDL 44 02/27/2024        Asthma: Patient presents after 2 recent exacerbations of asthma with coughing and wheezing. For the last few years, the patient has taken prednisone intermittently to control flare-ups. He did the same recently and took prednisone for a few days but the symptoms returned. He recently saw his pulmonologist who recommended taking Advair as scheduled 2x daily. Observed precipitants include: cold air. Current limitations in activity from asthma: none.    Medical History:   Past Medical History:   Diagnosis Date    Allergy     Asthma     Asthma 4/21/2023    Essential hypertension 6/17/2022    Hiatal hernia        Family History: family history includes No Known Problems in his father and mother.    Surgical History:   Past Surgical History:   Procedure Laterality Date    CAROTID ENDARTERECTOMY Left 9/11/2023    Procedure: ENDARTERECTOMY-CAROTID;  Surgeon: MAISHA Cui III, MD;  Location: [facility] OR [address] [place] [address];  Service: Vascular;  Laterality: Left;    CATARACT EXTRACTION          Social History:  reports that he has never smoked. He has never used smokeless tobacco. He reports current alcohol use. He reports that he does not use drugs.     Allergies:   Review of  patient's allergies indicates:   Allergen Reactions    Center-al house dust Shortness Of Breath    House dust mite Shortness Of Breath    Olive oil Rash       Medications:   Current Outpatient Medications   Medication Sig    albuterol (PROVENTIL/VENTOLIN HFA) 90 mcg/actuation inhaler INHALE 2 PUFFS INTO THE LUNGS FOUR TIMES DAILY AS NEEDED    aspirin (ECOTRIN) 81 MG EC tablet Take 1 tablet (81 mg total) by mouth once daily.    fluticasone-salmeterol diskus inhaler 500-50 mcg Inhale 1 puff into the lungs 2 (two) times daily. Controller    montelukast (SINGULAIR) 10 mg tablet Take 1 tablet (10 mg total) by mouth every evening.    sildenafiL (VIAGRA) 100 MG tablet TAKE ONE DAILY AS NEEDED FOR ERECTILE DYSFUNCTION    losartan (COZAAR) 100 MG tablet Take 1 tablet (100 mg total) by mouth once daily.    rosuvastatin (CRESTOR) 5 MG tablet Take 1 tablet (5 mg total) by mouth once daily.     No current facility-administered medications for this visit.       Health Maintenance:   Health Maintenance Topics with due status: Not Due       Topic Last Completion Date    TETANUS VACCINE 2015    Lipid Panel 2024    High Dose Statin 2025    Aspirin/Antiplatelet Therapy 2025       Eye Exam:  within the last year  Dental Exam: not recently  Cologuard:   2022  Hepatitis C screenin2017, neg     Vaccinations:  Immunization History   Administered Date(s) Administered    COVID-19 MRNA, LN-S PF (MODERNA HALF 0.25 ML DOSE) 2021, 2022    COVID-19, MRNA, LN-S, PF (MODERNA FULL 0.5 ML DOSE) 2021, 2021    COVID-19, MRNA, LN-S, PF (Pfizer) (Purple Cap) 10/19/2022    Hepatitis B (recombinant) Adjuvanted, 2 dose 2018    Hepatitis B, Adult 2017, 2018    Influenza 2020    Influenza - Quadrivalent 2019    Influenza - Quadrivalent - MDCK - PF 2020, 2022, 10/10/2023    Influenza - Quadrivalent - PF *Preferred* (6 months and older) 08/10/2017, 10/19/2018,  10/04/2021    Influenza - Trivalent - Afluria, Fluzone MDV 08/27/2010    Influenza - Trivalent - Fluarix, Flulaval, Fluzone, Afluria - PF 09/06/2011, 08/20/2012, 08/15/2013, 08/12/2015, 08/11/2016    Influenza - Trivalent - Fluzone High Dose - PF (65 years and older) 09/13/2024    Influenza A (H1N1) 2009 Monovalent - IM 12/17/2009    Pneumococcal Polysaccharide - 23 Valent 05/12/2017    Td (ADULT) 09/29/2005    Tdap 09/04/2015     Influenza: done  Tetanus: 2015  Shingrix: due  Pneumovax: 2017  Prevnar-20: due  RSV: due  Covid vaccine: not boosted    ADL's: independent  Memory: normal  Mental health: no signs of anxiety/depression  Advance Directives: <no information>  Falls: no falls  Nutrition: normal  Home Safety: no issues    Body mass index is 24.94 kg/m².  Wt Readings from Last 3 Encounters:   03/26/25 65.9 kg (145 lb 4.5 oz)   12/06/24 66.5 kg (146 lb 9.7 oz)   03/26/24 65.1 kg (143 lb 8.3 oz)   - he is very active outdoors      Review of Systems   Constitutional:  Negative for chills, diaphoresis, fatigue and fever.   HENT:  Negative for congestion, dental problem, ear discharge, ear pain, postnasal drip, rhinorrhea, sinus pressure, sore throat and trouble swallowing.    Eyes:  Negative for redness and visual disturbance.   Respiratory:  Positive for cough (mild, recovering from illness). Negative for chest tightness and shortness of breath.    Cardiovascular:  Negative for chest pain, palpitations and leg swelling.   Gastrointestinal:  Negative for abdominal pain, blood in stool, constipation, diarrhea, nausea and vomiting.   Endocrine: Negative for polydipsia and polyuria.   Genitourinary:  Negative for decreased urine volume, dysuria, frequency and hematuria.   Musculoskeletal:  Negative for arthralgias, back pain and myalgias.   Skin:  Positive for color change (has multiple freckles and moles on arms, legs and back, one scaly patch of skin on upper back). Negative for rash and wound.   Neurological:   Negative for dizziness, weakness, numbness and headaches.   Hematological:  Negative for adenopathy.   Psychiatric/Behavioral:  Negative for dysphoric mood and sleep disturbance. The patient is not nervous/anxious.           Objective:     Physical Exam  Vitals reviewed.   Constitutional:       General: He is awake. He is not in acute distress.     Appearance: Normal appearance. He is well-developed and well-groomed. He is not ill-appearing.   HENT:      Head: Normocephalic and atraumatic.      Right Ear: Hearing, tympanic membrane, ear canal and external ear normal. Tympanic membrane is not erythematous or bulging.      Left Ear: Hearing, tympanic membrane, ear canal and external ear normal. Tympanic membrane is not erythematous or bulging.      Nose: Nose normal. No congestion.      Mouth/Throat:      Mouth: Mucous membranes are moist.      Tongue: No lesions.      Pharynx: Oropharynx is clear. Uvula midline. No oropharyngeal exudate or posterior oropharyngeal erythema.   Eyes:      General: Lids are normal. Vision grossly intact. Gaze aligned appropriately. No scleral icterus.     Conjunctiva/sclera: Conjunctivae normal.      Right eye: Right conjunctiva is not injected.      Left eye: Left conjunctiva is not injected.      Pupils: Pupils are equal, round, and reactive to light.   Neck:      Thyroid: No thyroid mass or thyromegaly.   Cardiovascular:      Rate and Rhythm: Normal rate and regular rhythm.      Pulses: Normal pulses.      Heart sounds: Normal heart sounds. No murmur heard.  Pulmonary:      Effort: Pulmonary effort is normal. No respiratory distress.      Breath sounds: Normal breath sounds. No decreased breath sounds or wheezing.   Abdominal:      General: Bowel sounds are normal. There is no distension.      Palpations: Abdomen is soft.      Tenderness: There is no abdominal tenderness. There is no guarding or rebound.   Musculoskeletal:         General: Normal range of motion.      Cervical back:  Normal range of motion and neck supple.      Right lower leg: No edema.      Left lower leg: No edema.   Lymphadenopathy:      Cervical: No cervical adenopathy.      Upper Body:      Right upper body: No supraclavicular adenopathy.      Left upper body: No supraclavicular adenopathy.   Skin:     General: Skin is warm and dry.      Coloration: Skin is not cyanotic.      Findings: No lesion or rash.      Nails: There is no clubbing.      Comments: Multiple moles and freckles on back, a few lesions with appearance of seborrheic keratosis. One flat and scaly lesion on upper back.   Neurological:      General: No focal deficit present.      Mental Status: He is alert and oriented to person, place, and time.      Coordination: Coordination is intact.      Gait: Gait is intact.      Deep Tendon Reflexes: Reflexes are normal and symmetric. Reflexes normal.   Psychiatric:         Attention and Perception: Attention normal.         Mood and Affect: Mood normal.         Behavior: Behavior is cooperative.              Assessment:        1. Essential hypertension    2. Mixed hyperlipidemia    3. Moderate persistent asthma without complication    4. Chronic obstructive pulmonary disease, unspecified COPD type    5. Change in mole    6. Impaired glucose tolerance    7. Annual physical exam    8. Screening for colorectal cancer           Plan:     1. Essential hypertension  - controlled, continue losartan  - losartan (COZAAR) 100 MG tablet; Take 1 tablet (100 mg total) by mouth once daily.  Dispense: 90 tablet; Refill: 1  - Urinalysis; Future    2. Mixed hyperlipidemia  - takes Crestor QOD, check labs and will adjust if needed  - Lipid Panel; Future  - rosuvastatin (CRESTOR) 5 MG tablet; Take 1 tablet (5 mg total) by mouth once daily.  Dispense: 90 tablet; Refill: 1    3. Moderate persistent asthma without complication  - improving after 2 recent exacerbations  - reviewed recs from Pulm, patient to call provider when he uses  prednisone   - patient urged to use Advair as ordered   - continue Advair, albuterol, Singulair    4. Chronic obstructive pulmonary disease, unspecified COPD type  - continue albuterol as needed for SOB/wheezing    5. Change in mole  - Ambulatory referral/consult to Dermatology; Future   - patient was provided contact info and he will call     6. Impaired glucose tolerance  - CBC Auto Differential; Future  - Hemoglobin A1C; Future    7. Annual physical exam  - CBC Auto Differential; Future  - Comprehensive Metabolic Panel; Future  - Lipid Panel; Future  - TSH; Future  - Hemoglobin A1C; Future  - Urinalysis; Future    8. Screening for colorectal cancer  - due, discussed pros/cons of colonoscopy and he will consider and will call us to arrange this    RTC in 6 months for follow-up or sooner if needed    __________________________    Zaida Ornelas MD, PharmD  [facility]- Internal Medicine  American Board of Obesity Medicine diplomate  Office [phone]

## 2025-05-19 ENCOUNTER — PATIENT MESSAGE (OUTPATIENT)
Dept: ADMINISTRATIVE | Facility: HOSPITAL | Age: 65
End: 2025-05-19
Payer: MEDICARE

## 2025-05-23 NOTE — TELEPHONE ENCOUNTER
Returned patient's call regarding chemo. Reiterated that completing 4 cycles of chemotherapy is the standard and what Dr. Tariq recommends. Patient is discouraged at having to do another cycles but continues to state that she wants to do the right thing so that she can be safe and healthy. Once again stated that Dr. Tariq is recommending completing 4 cycles. Patient verbalized understanding.    Will refill albuterol x1. Please inform him that he needs annual exam prior to sending any additional refills.

## 2025-06-11 ENCOUNTER — TELEPHONE (OUTPATIENT)
Dept: INTERNAL MEDICINE | Facility: CLINIC | Age: 65
End: 2025-06-11
Payer: MEDICARE

## 2025-06-11 DIAGNOSIS — E78.2 MIXED HYPERLIPIDEMIA: ICD-10-CM

## 2025-06-11 NOTE — TELEPHONE ENCOUNTER
Care Due:                  Date            Visit Type   Department     Provider  --------------------------------------------------------------------------------                                EP -                              PRIMARY      MET INTERNAL  Last Visit: 03-      CARE ([facility])   MEDICINE       Zaida Ornelas  Next Visit: None Scheduled  None         None Found                                                            Last  Test          Frequency    Reason                     Performed    Due Date  --------------------------------------------------------------------------------    CMP.........  12 months..  losartan, rosuvastatin...  02- 02-    Lipid Panel.  12 months..  rosuvastatin.............  02- 02-    Health [facility] Embedded Care Due Messages. Reference number: [id number].   6/11/2025 3:13:41 PM CDT

## 2025-06-11 NOTE — TELEPHONE ENCOUNTER
"Per phone staff"COMMENT:  Pt's insurance company is requesting a new prescription for the patient with the instructions written  "Take 1 tablet every other day" because that is how he is taking them. "  "

## 2025-06-16 RX ORDER — ROSUVASTATIN CALCIUM 5 MG/1
5 TABLET, COATED ORAL DAILY
Qty: 90 TABLET | Refills: 1 | OUTPATIENT
Start: 2025-06-16

## 2025-06-16 NOTE — TELEPHONE ENCOUNTER
Last set of labs ordered on 3/26/25. He scheduled labs but canceled.    I cannot refill Crestor without labs. Please schedule.

## 2025-07-25 ENCOUNTER — TELEPHONE (OUTPATIENT)
Dept: PULMONOLOGY | Facility: HOSPITAL | Age: 65
End: 2025-07-25
Payer: MEDICARE

## 2025-07-25 NOTE — TELEPHONE ENCOUNTER
Telephone call to patient, he reports he is doing well. Asthma symptoms well controlled as usual during the summer. Plans to start taking advair after the first cold front this fall. Taking montelukast daily which he has been doing for years and tolerates well, reviewed neuropsychiatric side effects which is he has not had any trouble with. Needs follow-up with pulmonary clinic this fall, patient will call to schedule clinic visit if not scheduled in the next month.     Ngozi Reyes MD  PCCM Fellow

## 2025-08-07 DIAGNOSIS — J45.40 MODERATE PERSISTENT ASTHMA WITHOUT COMPLICATION: ICD-10-CM

## 2025-08-08 DIAGNOSIS — I10 ESSENTIAL HYPERTENSION: ICD-10-CM

## 2025-08-11 RX ORDER — ALBUTEROL SULFATE 90 UG/1
2 INHALANT RESPIRATORY (INHALATION) EVERY 4 HOURS PRN
Qty: 54 G | Refills: 3 | Status: SHIPPED | OUTPATIENT
Start: 2025-08-11

## 2025-08-11 RX ORDER — LOSARTAN POTASSIUM 100 MG/1
100 TABLET ORAL DAILY
Qty: 30 TABLET | Refills: 0 | Status: SHIPPED | OUTPATIENT
Start: 2025-08-11 | End: 2025-08-12

## 2025-08-18 DIAGNOSIS — J45.40 MODERATE PERSISTENT ASTHMA WITHOUT COMPLICATION: ICD-10-CM

## 2025-08-20 RX ORDER — MONTELUKAST SODIUM 10 MG/1
10 TABLET ORAL NIGHTLY
Qty: 90 TABLET | Refills: 0 | Status: SHIPPED | OUTPATIENT
Start: 2025-08-20

## 2025-08-22 ENCOUNTER — LAB VISIT (OUTPATIENT)
Dept: LAB | Facility: HOSPITAL | Age: 65
End: 2025-08-22
Attending: INTERNAL MEDICINE
Payer: MEDICARE

## 2025-08-22 DIAGNOSIS — I10 ESSENTIAL HYPERTENSION: ICD-10-CM

## 2025-08-22 DIAGNOSIS — R73.02 IMPAIRED GLUCOSE TOLERANCE: ICD-10-CM

## 2025-08-22 DIAGNOSIS — E78.2 MIXED HYPERLIPIDEMIA: ICD-10-CM

## 2025-08-22 DIAGNOSIS — Z00.00 ANNUAL PHYSICAL EXAM: ICD-10-CM

## 2025-08-22 LAB
ABSOLUTE EOSINOPHIL (OHS): 0.1 K/UL
ABSOLUTE MONOCYTE (OHS): 0.61 K/UL (ref 0.3–1)
ABSOLUTE NEUTROPHIL COUNT (OHS): 3.64 K/UL (ref 1.8–7.7)
ALBUMIN SERPL BCP-MCNC: 4.1 G/DL (ref 3.5–5.2)
ALP SERPL-CCNC: 73 UNIT/L (ref 40–150)
ALT SERPL W/O P-5'-P-CCNC: 25 UNIT/L (ref 0–55)
ANION GAP (OHS): 8 MMOL/L (ref 8–16)
AST SERPL-CCNC: 29 UNIT/L (ref 0–50)
BASOPHILS # BLD AUTO: 0.05 K/UL
BASOPHILS NFR BLD AUTO: 0.8 %
BILIRUB SERPL-MCNC: 0.3 MG/DL (ref 0.1–1)
BUN SERPL-MCNC: 15 MG/DL (ref 8–23)
CALCIUM SERPL-MCNC: 9.1 MG/DL (ref 8.7–10.5)
CHLORIDE SERPL-SCNC: 108 MMOL/L (ref 95–110)
CHOLEST SERPL-MCNC: 133 MG/DL (ref 120–199)
CHOLEST/HDLC SERPL: 3.2 {RATIO} (ref 2–5)
CO2 SERPL-SCNC: 24 MMOL/L (ref 23–29)
CREAT SERPL-MCNC: 0.8 MG/DL (ref 0.5–1.4)
EAG (OHS): 126 MG/DL (ref 68–131)
ERYTHROCYTE [DISTWIDTH] IN BLOOD BY AUTOMATED COUNT: 14.6 % (ref 11.5–14.5)
GFR SERPLBLD CREATININE-BSD FMLA CKD-EPI: >60 ML/MIN/1.73/M2
GLUCOSE SERPL-MCNC: 99 MG/DL (ref 70–110)
HBA1C MFR BLD: 6 % (ref 4–5.6)
HCT VFR BLD AUTO: 43.6 % (ref 40–54)
HDLC SERPL-MCNC: 41 MG/DL (ref 40–75)
HDLC SERPL: 30.8 % (ref 20–50)
HGB BLD-MCNC: 14 GM/DL (ref 14–18)
IMM GRANULOCYTES # BLD AUTO: 0.01 K/UL (ref 0–0.04)
IMM GRANULOCYTES NFR BLD AUTO: 0.2 % (ref 0–0.5)
LDLC SERPL CALC-MCNC: 71.2 MG/DL (ref 63–159)
LYMPHOCYTES # BLD AUTO: 1.53 K/UL (ref 1–4.8)
MCH RBC QN AUTO: 28.9 PG (ref 27–31)
MCHC RBC AUTO-ENTMCNC: 32.1 G/DL (ref 32–36)
MCV RBC AUTO: 90 FL (ref 82–98)
NONHDLC SERPL-MCNC: 92 MG/DL
NUCLEATED RBC (/100WBC) (OHS): 0 /100 WBC
PLATELET # BLD AUTO: 230 K/UL (ref 150–450)
PMV BLD AUTO: 10 FL (ref 9.2–12.9)
POTASSIUM SERPL-SCNC: 4.4 MMOL/L (ref 3.5–5.1)
PROT SERPL-MCNC: 7.2 GM/DL (ref 6–8.4)
RBC # BLD AUTO: 4.84 M/UL (ref 4.6–6.2)
RELATIVE EOSINOPHIL (OHS): 1.7 %
RELATIVE LYMPHOCYTE (OHS): 25.8 % (ref 18–48)
RELATIVE MONOCYTE (OHS): 10.3 % (ref 4–15)
RELATIVE NEUTROPHIL (OHS): 61.2 % (ref 38–73)
SODIUM SERPL-SCNC: 140 MMOL/L (ref 136–145)
TRIGL SERPL-MCNC: 104 MG/DL (ref 30–150)
TSH SERPL-ACNC: 1.34 UIU/ML (ref 0.4–4)
WBC # BLD AUTO: 5.94 K/UL (ref 3.9–12.7)

## 2025-08-22 PROCEDURE — 85025 COMPLETE CBC W/AUTO DIFF WBC: CPT

## 2025-08-22 PROCEDURE — 83036 HEMOGLOBIN GLYCOSYLATED A1C: CPT

## 2025-08-22 PROCEDURE — 36415 COLL VENOUS BLD VENIPUNCTURE: CPT | Mod: PO

## 2025-08-22 PROCEDURE — 84443 ASSAY THYROID STIM HORMONE: CPT

## 2025-08-22 PROCEDURE — 80061 LIPID PANEL: CPT

## 2025-08-22 PROCEDURE — 82040 ASSAY OF SERUM ALBUMIN: CPT

## (undated) DEVICE — SUT 2-0 12-18IN SILK

## (undated) DEVICE — LOOP VESSEL BLUE MAXI

## (undated) DEVICE — CATH ALL PUR URTHL RR 10FR

## (undated) DEVICE — EVACUATOR WOUND BULB 100CC

## (undated) DEVICE — SUT 3-0 12-18IN SILK

## (undated) DEVICE — SUT 4-0 12-18IN SILK BLACK

## (undated) DEVICE — DRAPE T THYROID STERILE

## (undated) DEVICE — SPONGE DERMACEA 4X4IN 12PLY

## (undated) DEVICE — BLADE SCALP OPHTL BEVEL STR

## (undated) DEVICE — CORD BIPOLAR 12 FOOT

## (undated) DEVICE — COVER LIGHT HANDLE 80/CA

## (undated) DEVICE — DRAPE INSTR MAGNETIC 10X16IN

## (undated) DEVICE — DRESSING TELFA STRL 4X3 LF

## (undated) DEVICE — SPONGE GAUZE 16PLY 4X4

## (undated) DEVICE — SUT PROLENE 6-0 C-1 30IN BL

## (undated) DEVICE — HEMOSTAT SURGICEL 4X8IN

## (undated) DEVICE — APPLICATOR CHLORAPREP ORN 26ML

## (undated) DEVICE — DRAPE STERI INSTRUMENT 1018

## (undated) DEVICE — PAD CURAD NONADH 3X4IN

## (undated) DEVICE — FORCEP STRAIGHT DISP

## (undated) DEVICE — ELECTRODE REM PLYHSV RETURN 9

## (undated) DEVICE — DRESSING TRANS 4X4 TEGADERM

## (undated) DEVICE — SUT MCRYL PLUS 4-0 PS2 27IN

## (undated) DEVICE — TRAY PERIPHERAL VASCULAR OMC

## (undated) DEVICE — LOOP VESSEL BLUE MINI 2/CARD

## (undated) DEVICE — TOWEL OR DISP STRL BLUE 4/PK

## (undated) DEVICE — DECANTER FLUID TRNSF WHITE 9IN

## (undated) DEVICE — SUT VICRYL 3-0 27 SH

## (undated) DEVICE — INSERTS STEALTH FIBRA SIZE 1.